# Patient Record
Sex: FEMALE | Race: BLACK OR AFRICAN AMERICAN | NOT HISPANIC OR LATINO | Employment: OTHER | URBAN - METROPOLITAN AREA
[De-identification: names, ages, dates, MRNs, and addresses within clinical notes are randomized per-mention and may not be internally consistent; named-entity substitution may affect disease eponyms.]

---

## 2022-06-03 ENCOUNTER — HOSPITAL ENCOUNTER (EMERGENCY)
Facility: HOSPITAL | Age: 62
Discharge: HOME | End: 2022-06-03
Attending: EMERGENCY MEDICINE
Payer: COMMERCIAL

## 2022-06-03 VITALS
DIASTOLIC BLOOD PRESSURE: 84 MMHG | HEART RATE: 65 BPM | SYSTOLIC BLOOD PRESSURE: 157 MMHG | BODY MASS INDEX: 31.57 KG/M2 | WEIGHT: 189.5 LBS | HEIGHT: 65 IN | RESPIRATION RATE: 17 BRPM | OXYGEN SATURATION: 100 % | TEMPERATURE: 98 F

## 2022-06-03 DIAGNOSIS — R10.32 LEFT LOWER QUADRANT ABDOMINAL PAIN: ICD-10-CM

## 2022-06-03 DIAGNOSIS — K57.92 DIVERTICULITIS: Primary | ICD-10-CM

## 2022-06-03 LAB
ALBUMIN SERPL BCP-MCNC: 3.6 G/DL (ref 3.5–5.2)
ALP SERPL-CCNC: 56 U/L (ref 55–135)
ALT SERPL W/O P-5'-P-CCNC: 24 U/L (ref 10–44)
ANION GAP SERPL CALC-SCNC: 10 MMOL/L (ref 8–16)
AST SERPL-CCNC: 19 U/L (ref 10–40)
BASOPHILS # BLD AUTO: 0.07 K/UL (ref 0–0.2)
BASOPHILS NFR BLD: 0.8 % (ref 0–1.9)
BILIRUB SERPL-MCNC: 0.4 MG/DL (ref 0.1–1)
BILIRUB UR QL STRIP: NEGATIVE
BUN SERPL-MCNC: 11 MG/DL (ref 8–23)
CALCIUM SERPL-MCNC: 9.1 MG/DL (ref 8.7–10.5)
CHLORIDE SERPL-SCNC: 106 MMOL/L (ref 95–110)
CLARITY UR: CLEAR
CO2 SERPL-SCNC: 26 MMOL/L (ref 23–29)
COLOR UR: YELLOW
CREAT SERPL-MCNC: 0.7 MG/DL (ref 0.5–1.4)
DIFFERENTIAL METHOD: ABNORMAL
EOSINOPHIL # BLD AUTO: 0.1 K/UL (ref 0–0.5)
EOSINOPHIL NFR BLD: 1.1 % (ref 0–8)
ERYTHROCYTE [DISTWIDTH] IN BLOOD BY AUTOMATED COUNT: 13.9 % (ref 11.5–14.5)
EST. GFR  (AFRICAN AMERICAN): >60 ML/MIN/1.73 M^2
EST. GFR  (NON AFRICAN AMERICAN): >60 ML/MIN/1.73 M^2
GLUCOSE SERPL-MCNC: 120 MG/DL (ref 70–110)
GLUCOSE UR QL STRIP: NEGATIVE
HCT VFR BLD AUTO: 37.9 % (ref 37–48.5)
HGB BLD-MCNC: 12 G/DL (ref 12–16)
HGB UR QL STRIP: NEGATIVE
IMM GRANULOCYTES # BLD AUTO: 0.03 K/UL (ref 0–0.04)
IMM GRANULOCYTES NFR BLD AUTO: 0.3 % (ref 0–0.5)
KETONES UR QL STRIP: NEGATIVE
LEUKOCYTE ESTERASE UR QL STRIP: NEGATIVE
LYMPHOCYTES # BLD AUTO: 3.1 K/UL (ref 1–4.8)
LYMPHOCYTES NFR BLD: 33.8 % (ref 18–48)
MCH RBC QN AUTO: 27.6 PG (ref 27–31)
MCHC RBC AUTO-ENTMCNC: 31.7 G/DL (ref 32–36)
MCV RBC AUTO: 87 FL (ref 82–98)
MONOCYTES # BLD AUTO: 0.6 K/UL (ref 0.3–1)
MONOCYTES NFR BLD: 6.6 % (ref 4–15)
NEUTROPHILS # BLD AUTO: 5.3 K/UL (ref 1.8–7.7)
NEUTROPHILS NFR BLD: 57.4 % (ref 38–73)
NITRITE UR QL STRIP: NEGATIVE
NRBC BLD-RTO: 0 /100 WBC
PH UR STRIP: 6 [PH] (ref 5–8)
PLATELET # BLD AUTO: 350 K/UL (ref 150–450)
PMV BLD AUTO: 9.4 FL (ref 9.2–12.9)
POTASSIUM SERPL-SCNC: 4.3 MMOL/L (ref 3.5–5.1)
PROT SERPL-MCNC: 7.4 G/DL (ref 6–8.4)
PROT UR QL STRIP: NEGATIVE
RBC # BLD AUTO: 4.34 M/UL (ref 4–5.4)
SODIUM SERPL-SCNC: 142 MMOL/L (ref 136–145)
SP GR UR STRIP: 1.02 (ref 1–1.03)
URN SPEC COLLECT METH UR: NORMAL
UROBILINOGEN UR STRIP-ACNC: NEGATIVE EU/DL
WBC # BLD AUTO: 9.21 K/UL (ref 3.9–12.7)

## 2022-06-03 PROCEDURE — 99284 EMERGENCY DEPT VISIT MOD MDM: CPT | Mod: 25

## 2022-06-03 PROCEDURE — 81003 URINALYSIS AUTO W/O SCOPE: CPT | Performed by: EMERGENCY MEDICINE

## 2022-06-03 PROCEDURE — 85025 COMPLETE CBC W/AUTO DIFF WBC: CPT | Performed by: EMERGENCY MEDICINE

## 2022-06-03 PROCEDURE — 80053 COMPREHEN METABOLIC PANEL: CPT | Performed by: EMERGENCY MEDICINE

## 2022-06-03 RX ORDER — HYDROCHLOROTHIAZIDE 25 MG/1
25 TABLET ORAL DAILY
COMMUNITY
Start: 2022-02-08

## 2022-06-03 RX ORDER — METRONIDAZOLE 500 MG/1
500 TABLET ORAL EVERY 12 HOURS
Qty: 14 TABLET | Refills: 0 | Status: SHIPPED | OUTPATIENT
Start: 2022-06-03 | End: 2022-06-10

## 2022-06-03 RX ORDER — CIPROFLOXACIN 500 MG/1
500 TABLET ORAL 2 TIMES DAILY
Qty: 14 TABLET | Refills: 0 | Status: SHIPPED | OUTPATIENT
Start: 2022-06-03 | End: 2022-06-10

## 2022-06-03 RX ORDER — BRIMONIDINE TARTRATE, TIMOLOL MALEATE 2; 5 MG/ML; MG/ML
1 SOLUTION/ DROPS OPHTHALMIC 2 TIMES DAILY
COMMUNITY
Start: 2022-05-20

## 2022-06-03 RX ORDER — DIFLUPREDNATE OPHTHALMIC 0.5 MG/ML
1 EMULSION OPHTHALMIC 4 TIMES DAILY
COMMUNITY
Start: 2022-04-29

## 2022-06-03 RX ORDER — LATANOPROST 50 UG/ML
1 SOLUTION/ DROPS OPHTHALMIC NIGHTLY
COMMUNITY
Start: 2022-05-17

## 2022-06-03 RX ORDER — BENAZEPRIL HYDROCHLORIDE 20 MG/1
20 TABLET ORAL DAILY
COMMUNITY
Start: 2022-02-08

## 2022-06-03 RX ORDER — LEVOTHYROXINE SODIUM 75 UG/1
75 TABLET ORAL DAILY
COMMUNITY
Start: 2022-02-08

## 2022-06-03 RX ORDER — METFORMIN HYDROCHLORIDE 500 MG/1
500 TABLET ORAL 2 TIMES DAILY
COMMUNITY
Start: 2022-02-08

## 2022-06-03 RX ORDER — BROMFENAC SODIUM 0.81 MG/ML
1 SOLUTION/ DROPS OPHTHALMIC DAILY
COMMUNITY
Start: 2022-06-02

## 2022-06-03 NOTE — ED PROVIDER NOTES
SCRIBE #1 NOTE: I, Celina Ortega, am scribing for, and in the presence of, Emmanuel San MD. I have scribed the entire note.      History      Chief Complaint   Patient presents with    Abdominal Pain     Pt states LLQ abd pain x 3 days denies N/V/D. States she has a hx of diverticulitis       Review of patient's allergies indicates:  No Known Allergies     HPI   HPI    6/3/2022, 8:19 AM   History obtained from the patient      History of Present Illness: Marlyn Gary is a 61 y.o. female patient with a PMHx of diverticulitis who presents to the Emergency Department for LLQ abdominal pain which onset gradually 3 days PTA. The pt reports that she has a history of diverticulitis, and that her sxs are similar to when she has had diverticulitis in the past. Symptoms are constant and moderate in severity. No mitigating or exacerbating factors reported. No associated sxs reported. Patient denies any N/V/D, dysuria, fever, chills, SOB, CP, weakness, and all other sxs at this time. No prior Tx reported. No further complaints or concerns at this time.         Arrival mode: Personal vehicle     PCP: RAY Rick       Past Medical History:  No past medical history on file.    Past Surgical History:  No past surgical history on file.      Family History:  No family history on file.    Social History:  Social History     Tobacco Use    Smoking status: Not on file    Smokeless tobacco: Not on file   Substance and Sexual Activity    Alcohol use: Not on file    Drug use: Not on file    Sexual activity: Not on file       ROS   Review of Systems   Constitutional: Negative for chills and fever.   HENT: Negative for sore throat.    Respiratory: Negative for shortness of breath.    Cardiovascular: Negative for chest pain.   Gastrointestinal: Positive for abdominal pain (LLQ). Negative for diarrhea, nausea and vomiting.   Genitourinary: Negative for dysuria.   Musculoskeletal: Negative for back pain.   Skin:  "Negative for rash.   Neurological: Negative for weakness.   Hematological: Does not bruise/bleed easily.   All other systems reviewed and are negative.    Physical Exam      Initial Vitals [06/03/22 0812]   BP Pulse Resp Temp SpO2   (!) 152/85 72 17 98.2 °F (36.8 °C) 99 %      MAP       --          Physical Exam  Nursing Notes and Vital Signs Reviewed.  Constitutional: Patient is in no acute distress. Well-developed and well-nourished.  Head: Atraumatic. Normocephalic.  Eyes: PERRL. EOM intact. Conjunctivae are not pale. No scleral icterus.  ENT: Mucous membranes are moist. Oropharynx is clear and symmetric.    Neck: Supple. Full ROM. No lymphadenopathy.  Cardiovascular: Regular rate. Regular rhythm. No murmurs, rubs, or gallops. Distal pulses are 2+ and symmetric.  Pulmonary/Chest: No respiratory distress. Clear to auscultation bilaterally. No wheezing or rales.  Abdominal: Soft and non-distended.  There is mild LLQ tenderness to palpation.  No rebound, guarding, or rigidity.   Musculoskeletal: Moves all extremities. No obvious deformities. No edema.  Skin: Warm and dry.  Neurological:  Alert, awake, and appropriate.  Normal speech.  No acute focal neurological deficits are appreciated.  Psychiatric: Normal affect. Good eye contact. Appropriate in content.    ED Course    Procedures  ED Vital Signs:  Vitals:    06/03/22 0812   BP: (!) 152/85   Pulse: 72   Resp: 17   Temp: 98.2 °F (36.8 °C)   TempSrc: Oral   SpO2: 99%   Weight: 86 kg (189 lb 7.8 oz)   Height: 5' 4.5" (1.638 m)       Abnormal Lab Results:  Labs Reviewed   CBC W/ AUTO DIFFERENTIAL - Abnormal; Notable for the following components:       Result Value    MCHC 31.7 (*)     All other components within normal limits   COMPREHENSIVE METABOLIC PANEL - Abnormal; Notable for the following components:    Glucose 120 (*)     All other components within normal limits   URINALYSIS, REFLEX TO URINE CULTURE    Narrative:     Specimen Source->Urine        All Lab " Results:  Results for orders placed or performed during the hospital encounter of 06/03/22   CBC Auto Differential   Result Value Ref Range    WBC 9.21 3.90 - 12.70 K/uL    RBC 4.34 4.00 - 5.40 M/uL    Hemoglobin 12.0 12.0 - 16.0 g/dL    Hematocrit 37.9 37.0 - 48.5 %    MCV 87 82 - 98 fL    MCH 27.6 27.0 - 31.0 pg    MCHC 31.7 (L) 32.0 - 36.0 g/dL    RDW 13.9 11.5 - 14.5 %    Platelets 350 150 - 450 K/uL    MPV 9.4 9.2 - 12.9 fL    Immature Granulocytes 0.3 0.0 - 0.5 %    Gran # (ANC) 5.3 1.8 - 7.7 K/uL    Immature Grans (Abs) 0.03 0.00 - 0.04 K/uL    Lymph # 3.1 1.0 - 4.8 K/uL    Mono # 0.6 0.3 - 1.0 K/uL    Eos # 0.1 0.0 - 0.5 K/uL    Baso # 0.07 0.00 - 0.20 K/uL    nRBC 0 0 /100 WBC    Gran % 57.4 38.0 - 73.0 %    Lymph % 33.8 18.0 - 48.0 %    Mono % 6.6 4.0 - 15.0 %    Eosinophil % 1.1 0.0 - 8.0 %    Basophil % 0.8 0.0 - 1.9 %    Differential Method Automated    Comprehensive Metabolic Panel   Result Value Ref Range    Sodium 142 136 - 145 mmol/L    Potassium 4.3 3.5 - 5.1 mmol/L    Chloride 106 95 - 110 mmol/L    CO2 26 23 - 29 mmol/L    Glucose 120 (H) 70 - 110 mg/dL    BUN 11 8 - 23 mg/dL    Creatinine 0.7 0.5 - 1.4 mg/dL    Calcium 9.1 8.7 - 10.5 mg/dL    Total Protein 7.4 6.0 - 8.4 g/dL    Albumin 3.6 3.5 - 5.2 g/dL    Total Bilirubin 0.4 0.1 - 1.0 mg/dL    Alkaline Phosphatase 56 55 - 135 U/L    AST 19 10 - 40 U/L    ALT 24 10 - 44 U/L    Anion Gap 10 8 - 16 mmol/L    eGFR if African American >60 >60 mL/min/1.73 m^2    eGFR if non African American >60 >60 mL/min/1.73 m^2   Urinalysis, Reflex to Urine Culture Urine, Clean Catch    Specimen: Urine   Result Value Ref Range    Specimen UA Urine, Clean Catch     Color, UA Yellow Yellow, Straw, Maci    Appearance, UA Clear Clear    pH, UA 6.0 5.0 - 8.0    Specific Gravity, UA 1.025 1.005 - 1.030    Protein, UA Negative Negative    Glucose, UA Negative Negative    Ketones, UA Negative Negative    Bilirubin (UA) Negative Negative    Occult Blood UA Negative  Negative    Nitrite, UA Negative Negative    Urobilinogen, UA Negative <2.0 EU/dL    Leukocytes, UA Negative Negative         Imaging Results:  Imaging Results          CT Renal Stone Study ABD Pelvis WO (Final result)  Result time 06/03/22 08:56:23    Final result by Nara Crowder MD (Timothy) (06/03/22 08:56:23)                 Impression:      Findings compatible with diverticulitis.  No evidence of perforation or abscess.  No evidence of obstructive uropathy.    All CT scans at this facility use dose modulation, iterative reconstructions, and/or weight base dosing when appropriate to reduce radiation dose to as low as reasonably achievable      Electronically signed by: Nara Crowder MD  Date:    06/03/2022  Time:    08:56             Narrative:    EXAMINATION:  CT RENAL STONE STUDY ABD PELVIS WO    CLINICAL HISTORY:  Flank pain, kidney stone suspected;    COMPARISON:  None    FINDINGS:  Lung bases are clear    2.4 cm left lobe liver cyst.  No suspicious masses.  The pancreas appears normal.  Spleen is unremarkable.    Prior cholecystectomy.  No bile duct dilatation.    The kidneys are normal.    The aorta and inferior vena cava are unremarkable.    No evidence of bowel obstruction.  Normal appendix.  Inflammatory changes are seen involving this sigmoid colon compatible with diverticulitis.  No evidence of perforation or abscess.    Bladder is normal. No abnormal masses or fluid collections in the pelvis.    Skeletal structures are intact.  No acute skeletal findings.                                        The Emergency Provider reviewed the vital signs and test results, which are outlined above.    ED Discussion     9:13 AM: Reassessed pt at this time. Discussed with pt all pertinent ED information and results. Discussed pt dx and plan of tx. Gave pt all f/u and return to the ED instructions. All questions and concerns were addressed at this time. Pt expresses understanding of information and instructions,  and is comfortable with plan to discharge. Pt is stable for discharge.    I discussed with patient and/or family/caretaker that evaluation in the ED does not suggest any emergent or life threatening medical conditions requiring immediate intervention beyond what was provided in the ED, and I believe patient is safe for discharge.  Regardless, an unremarkable evaluation in the ED does not preclude the development or presence of a serious of life threatening condition. As such, patient was instructed to return immediately for any worsening or change in current symptoms.           ED Medication(s):  Medications - No data to display     Follow-up Information     RAY Rick.    Specialty: General Practice  Contact information:  [address]  [address]  [facility]  [place] [id number] [phone]                         New Prescriptions    CIPROFLOXACIN HCL (CIPRO) 500 MG TABLET    Take 1 tablet (500 mg total) by mouth 2 (two) times daily. for 7 days    METRONIDAZOLE (FLAGYL) 500 MG TABLET    Take 1 tablet (500 mg total) by mouth every 12 (twelve) hours. for 7 days        Medication List      START taking these medications    ciprofloxacin HCl 500 MG tablet  Commonly known as: CIPRO  Take 1 tablet (500 mg total) by mouth 2 (two) times daily. for 7 days     metroNIDAZOLE 500 MG tablet  Commonly known as: FLAGYL  Take 1 tablet (500 mg total) by mouth every 12 (twelve) hours. for 7 days        ASK your doctor about these medications    benazepriL 20 MG tablet  Commonly known as: LOTENSIN     bromfenac 0.07 % Drop  Commonly known as: PROLENSA     COMBIGAN 0.2-0.5 % Drop  Generic drug: brimonidine-timoloL     difluprednate 0.05 % Drop ophthalmic solution  Commonly known as: DUREZOL     hydroCHLOROthiazide 25 MG tablet  Commonly known as: HYDRODIURIL     latanoprost 0.005 % ophthalmic solution     metFORMIN 500 MG tablet  Commonly known as: GLUCOPHAGE     SYNTHROID 75 MCG tablet  Generic drug:  levothyroxine           Where to Get Your Medications      You can get these medications from any pharmacy    Bring a paper prescription for each of these medications  · ciprofloxacin HCl 500 MG tablet  · metroNIDAZOLE 500 MG tablet           Medical Decision Making    Medical Decision Making:   Clinical Tests:   Lab Tests: Ordered and Reviewed  Radiological Study: Ordered and Reviewed           Scribe Attestation:   Scribe #1: I performed the above scribed service and the documentation accurately describes the services I performed. I attest to the accuracy of the note.    Attending:   Physician Attestation Statement for Scribe #1: I, Emmanuel San MD, personally performed the services described in this documentation, as scribed by Celina Ortega, in my presence, and it is both accurate and complete.          Clinical Impression       ICD-10-CM ICD-9-CM   1. Diverticulitis  K57.92 562.11   2. Left lower quadrant abdominal pain  R10.32 789.04       Disposition:   Disposition: Discharged  Condition: Stable         Emmanuel San MD  06/03/22 0945

## 2022-06-03 NOTE — PHARMACY MED REC
"Admission Medication History     The home medication history was taken by Juan Moya.    You may go to "Admission" then "Reconcile Home Medications" tabs to review and/or act upon these items.      The home medication list has been updated by the Pharmacy department.    Please read ALL comments highlighted in yellow.    Please address this information as you see fit.     Feel free to contact us if you have any questions or require assistance.      Medications listed below were obtained from: Patient/family, [employer] and Patient's pharmacy  (Not in a hospital admission)      Potential issues to be addressed PRIOR TO DISCHARGE: NONE      Juan Moya, [facility]  [employer] [phone]  Feel free to secure chat me.      Current Outpatient Medications on File Prior to Encounter   Medication Sig Dispense Refill Last Dose    benazepriL (LOTENSIN) 20 MG tablet Take 20 mg by mouth once daily.   Past Week at 06/01/2022    COMBIGAN 0.2-0.5 % Drop Place 1 drop into both eyes 2 (two) times daily.   6/3/2022 at Unknown time    difluprednate (DUREZOL) 0.05 % Drop ophthalmic solution Place 1 drop into both eyes 4 (four) times daily.   6/3/2022 at Unknown time    hydroCHLOROthiazide (HYDRODIURIL) 25 MG tablet Take 25 mg by mouth once daily.   Past Week at 06/01/2022    latanoprost 0.005 % ophthalmic solution Place 1 drop into both eyes nightly.   6/2/2022 at Unknown time    metFORMIN (GLUCOPHAGE) 500 MG tablet Take 500 mg by mouth 2 (two) times daily.   Past Week at 06/01/2022    SYNTHROID 75 mcg tablet Take 75 mcg by mouth once daily.   Past Week at 06/01/2022    bromfenac (PROLENSA) 0.07 % Drop Apply 1 drop to eye once daily. (both eyes)                              .          "

## 2022-07-02 ENCOUNTER — HOSPITAL ENCOUNTER (EMERGENCY)
Facility: HOSPITAL | Age: 62
Discharge: HOME | End: 2022-07-02
Attending: EMERGENCY MEDICINE
Payer: COMMERCIAL

## 2022-07-02 VITALS
BODY MASS INDEX: 31.22 KG/M2 | TEMPERATURE: 99 F | DIASTOLIC BLOOD PRESSURE: 74 MMHG | RESPIRATION RATE: 17 BRPM | OXYGEN SATURATION: 96 % | SYSTOLIC BLOOD PRESSURE: 152 MMHG | WEIGHT: 184.75 LBS | HEART RATE: 71 BPM

## 2022-07-02 DIAGNOSIS — K57.32 DIVERTICULITIS LARGE INTESTINE W/O PERFORATION OR ABSCESS W/O BLEEDING: Primary | ICD-10-CM

## 2022-07-02 LAB
ALBUMIN SERPL BCP-MCNC: 4 G/DL (ref 3.5–5.2)
ALP SERPL-CCNC: 70 U/L (ref 55–135)
ALT SERPL W/O P-5'-P-CCNC: 29 U/L (ref 10–44)
ANION GAP SERPL CALC-SCNC: 11 MMOL/L (ref 8–16)
AST SERPL-CCNC: 30 U/L (ref 10–40)
BASOPHILS # BLD AUTO: 0.09 K/UL (ref 0–0.2)
BASOPHILS NFR BLD: 0.8 % (ref 0–1.9)
BILIRUB SERPL-MCNC: 0.3 MG/DL (ref 0.1–1)
BILIRUB UR QL STRIP: NEGATIVE
BUN SERPL-MCNC: 10 MG/DL (ref 8–23)
CALCIUM SERPL-MCNC: 9.8 MG/DL (ref 8.7–10.5)
CHLORIDE SERPL-SCNC: 101 MMOL/L (ref 95–110)
CLARITY UR: CLEAR
CO2 SERPL-SCNC: 27 MMOL/L (ref 23–29)
COLOR UR: COLORLESS
CREAT SERPL-MCNC: 0.7 MG/DL (ref 0.5–1.4)
DIFFERENTIAL METHOD: ABNORMAL
EOSINOPHIL # BLD AUTO: 0.2 K/UL (ref 0–0.5)
EOSINOPHIL NFR BLD: 2.1 % (ref 0–8)
ERYTHROCYTE [DISTWIDTH] IN BLOOD BY AUTOMATED COUNT: 13.7 % (ref 11.5–14.5)
EST. GFR  (AFRICAN AMERICAN): >60 ML/MIN/1.73 M^2
EST. GFR  (NON AFRICAN AMERICAN): >60 ML/MIN/1.73 M^2
GLUCOSE SERPL-MCNC: 84 MG/DL (ref 70–110)
GLUCOSE UR QL STRIP: NEGATIVE
HCT VFR BLD AUTO: 44.6 % (ref 37–48.5)
HGB BLD-MCNC: 14.3 G/DL (ref 12–16)
HGB UR QL STRIP: NEGATIVE
IMM GRANULOCYTES # BLD AUTO: 0.03 K/UL (ref 0–0.04)
IMM GRANULOCYTES NFR BLD AUTO: 0.3 % (ref 0–0.5)
KETONES UR QL STRIP: NEGATIVE
LEUKOCYTE ESTERASE UR QL STRIP: NEGATIVE
LYMPHOCYTES # BLD AUTO: 4.2 K/UL (ref 1–4.8)
LYMPHOCYTES NFR BLD: 37 % (ref 18–48)
MCH RBC QN AUTO: 27.6 PG (ref 27–31)
MCHC RBC AUTO-ENTMCNC: 32.1 G/DL (ref 32–36)
MCV RBC AUTO: 86 FL (ref 82–98)
MONOCYTES # BLD AUTO: 0.9 K/UL (ref 0.3–1)
MONOCYTES NFR BLD: 7.7 % (ref 4–15)
NEUTROPHILS # BLD AUTO: 5.9 K/UL (ref 1.8–7.7)
NEUTROPHILS NFR BLD: 52.1 % (ref 38–73)
NITRITE UR QL STRIP: NEGATIVE
NRBC BLD-RTO: 0 /100 WBC
PH UR STRIP: 5 [PH] (ref 5–8)
PLATELET # BLD AUTO: 459 K/UL (ref 150–450)
PMV BLD AUTO: 8.9 FL (ref 9.2–12.9)
POTASSIUM SERPL-SCNC: 4.8 MMOL/L (ref 3.5–5.1)
PROT SERPL-MCNC: 8.9 G/DL (ref 6–8.4)
PROT UR QL STRIP: NEGATIVE
RBC # BLD AUTO: 5.19 M/UL (ref 4–5.4)
SODIUM SERPL-SCNC: 139 MMOL/L (ref 136–145)
SP GR UR STRIP: 1 (ref 1–1.03)
URN SPEC COLLECT METH UR: ABNORMAL
UROBILINOGEN UR STRIP-ACNC: NEGATIVE EU/DL
WBC # BLD AUTO: 11.33 K/UL (ref 3.9–12.7)

## 2022-07-02 PROCEDURE — 25000003 PHARM REV CODE 250: Performed by: EMERGENCY MEDICINE

## 2022-07-02 PROCEDURE — 99285 EMERGENCY DEPT VISIT HI MDM: CPT | Mod: 25

## 2022-07-02 PROCEDURE — 96360 HYDRATION IV INFUSION INIT: CPT

## 2022-07-02 PROCEDURE — 81003 URINALYSIS AUTO W/O SCOPE: CPT | Performed by: EMERGENCY MEDICINE

## 2022-07-02 PROCEDURE — 96361 HYDRATE IV INFUSION ADD-ON: CPT

## 2022-07-02 PROCEDURE — 80053 COMPREHEN METABOLIC PANEL: CPT | Performed by: EMERGENCY MEDICINE

## 2022-07-02 PROCEDURE — 85025 COMPLETE CBC W/AUTO DIFF WBC: CPT | Performed by: EMERGENCY MEDICINE

## 2022-07-02 RX ORDER — AMOXICILLIN AND CLAVULANATE POTASSIUM 875; 125 MG/1; MG/1
1 TABLET, FILM COATED ORAL 2 TIMES DAILY
Qty: 14 TABLET | Refills: 0 | Status: ON HOLD | OUTPATIENT
Start: 2022-07-02 | End: 2022-07-13 | Stop reason: SDUPTHER

## 2022-07-02 RX ORDER — HYDROCODONE BITARTRATE AND ACETAMINOPHEN 7.5; 325 MG/1; MG/1
1 TABLET ORAL EVERY 6 HOURS PRN
Qty: 18 TABLET | Refills: 0 | Status: ON HOLD | OUTPATIENT
Start: 2022-07-02 | End: 2022-10-08 | Stop reason: SDUPTHER

## 2022-07-02 RX ADMIN — SODIUM CHLORIDE 1000 ML: 0.9 INJECTION, SOLUTION INTRAVENOUS at 03:07

## 2022-07-02 NOTE — ED PROVIDER NOTES
SCRIBE #1 NOTE: I, Noe Moon, am scribing for, and in the presence of, Beatrice Galeas MD. I have scribed the entire note.       History     Chief Complaint   Patient presents with    Abdominal Pain     LLQ abdominal pain, bloating, seen last month, hx diverticulitis, states abx have not helped      Review of patient's allergies indicates:  No Known Allergies      History of Present Illness     HPI    7/2/2022, 3:25 PM  History obtained from the patient      History of Present Illness: Marlyn Gary is a 61 y.o. female patient who presents to the Emergency Department for evaluation of abdominal pain which onset a couple of weeks ago. Symptoms are constant and moderate in severity. No mitigating or exacerbating factors reported. Associated sxs include diarrhea. Patient denies any fever, SOB, CP, back pain, sore throat, chills, N/V, weakness, dysuria, and all other sxs at this time. Prior Tx includes antibiotics, which the patient has taken all of without improvement of sxs. No further complaints or concerns at this time.       Arrival mode: Personal vehicle    PCP: RAY Rick        Past Medical History:  No past medical history on file.    Past Surgical History:  No past surgical history on file.      Family History:  No family history on file.    Social History:  Social History     Tobacco Use    Smoking status: Not on file    Smokeless tobacco: Not on file   Substance and Sexual Activity    Alcohol use: Not on file    Drug use: Not on file    Sexual activity: Not on file        Review of Systems     Review of Systems   Constitutional: Negative for chills and fever.   HENT: Negative for sore throat.    Respiratory: Negative for shortness of breath.    Cardiovascular: Negative for chest pain.   Gastrointestinal: Positive for abdominal pain and diarrhea. Negative for nausea and vomiting.   Genitourinary: Negative for dysuria.   Musculoskeletal: Negative for back pain.   Skin: Negative  for rash.   Neurological: Negative for weakness.   Hematological: Does not bruise/bleed easily.   All other systems reviewed and are negative.     Physical Exam     Initial Vitals [07/02/22 1412]   BP Pulse Resp Temp SpO2   119/69 89 20 98.6 °F (37 °C) 98 %      MAP       --          Physical Exam  Nursing Notes and Vital Signs Reviewed.  Constitutional: Patient is in no apparent distress. Well-developed and well-nourished.  Head: Atraumatic. Normocephalic.  Eyes: PERRL. EOM intact. Conjunctivae are not pale. No scleral icterus.  ENT: Mucous membranes are moist. Oropharynx is clear and symmetric.    Neck: Supple. Full ROM. No lymphadenopathy.  Cardiovascular: Regular rate. Regular rhythm. No murmurs, rubs, or gallops. Distal pulses are 2+ and symmetric.  Pulmonary/Chest: No respiratory distress. Clear to auscultation bilaterally. No wheezing or rales.  Abdominal: Soft and non-distended.  There is no tenderness.  No rebound, guarding, or rigidity. Good bowel sounds.  Musculoskeletal: Moves all extremities. No obvious deformities. No edema. No calf tenderness.  Skin: Warm and dry.  Neurological:  Alert, awake, and appropriate.  Normal speech.  No acute focal neurological deficits are appreciated.  Psychiatric: Normal affect. Good eye contact. Appropriate in content.     ED Course   Procedures  ED Vital Signs:  Vitals:    07/02/22 1412 07/02/22 1707   BP: 119/69 (!) 152/74   Pulse: 89 71   Resp: 20 17   Temp: 98.6 °F (37 °C)    TempSrc: Oral    SpO2: 98% 96%   Weight: 83.8 kg (184 lb 11.9 oz)        Abnormal Lab Results:  Labs Reviewed   CBC W/ AUTO DIFFERENTIAL - Abnormal; Notable for the following components:       Result Value    Platelets 459 (*)     MPV 8.9 (*)     All other components within normal limits   COMPREHENSIVE METABOLIC PANEL - Abnormal; Notable for the following components:    Total Protein 8.9 (*)     All other components within normal limits   URINALYSIS, REFLEX TO URINE CULTURE - Abnormal; Notable  for the following components:    Color, UA Colorless (*)     All other components within normal limits    Narrative:     Specimen Source->Urine        All Lab Results:  Results for orders placed or performed during the hospital encounter of 07/02/22   CBC auto differential   Result Value Ref Range    WBC 11.33 3.90 - 12.70 K/uL    RBC 5.19 4.00 - 5.40 M/uL    Hemoglobin 14.3 12.0 - 16.0 g/dL    Hematocrit 44.6 37.0 - 48.5 %    MCV 86 82 - 98 fL    MCH 27.6 27.0 - 31.0 pg    MCHC 32.1 32.0 - 36.0 g/dL    RDW 13.7 11.5 - 14.5 %    Platelets 459 (H) 150 - 450 K/uL    MPV 8.9 (L) 9.2 - 12.9 fL    Immature Granulocytes 0.3 0.0 - 0.5 %    Gran # (ANC) 5.9 1.8 - 7.7 K/uL    Immature Grans (Abs) 0.03 0.00 - 0.04 K/uL    Lymph # 4.2 1.0 - 4.8 K/uL    Mono # 0.9 0.3 - 1.0 K/uL    Eos # 0.2 0.0 - 0.5 K/uL    Baso # 0.09 0.00 - 0.20 K/uL    nRBC 0 0 /100 WBC    Gran % 52.1 38.0 - 73.0 %    Lymph % 37.0 18.0 - 48.0 %    Mono % 7.7 4.0 - 15.0 %    Eosinophil % 2.1 0.0 - 8.0 %    Basophil % 0.8 0.0 - 1.9 %    Differential Method Automated    Comprehensive metabolic panel   Result Value Ref Range    Sodium 139 136 - 145 mmol/L    Potassium 4.8 3.5 - 5.1 mmol/L    Chloride 101 95 - 110 mmol/L    CO2 27 23 - 29 mmol/L    Glucose 84 70 - 110 mg/dL    BUN 10 8 - 23 mg/dL    Creatinine 0.7 0.5 - 1.4 mg/dL    Calcium 9.8 8.7 - 10.5 mg/dL    Total Protein 8.9 (H) 6.0 - 8.4 g/dL    Albumin 4.0 3.5 - 5.2 g/dL    Total Bilirubin 0.3 0.1 - 1.0 mg/dL    Alkaline Phosphatase 70 55 - 135 U/L    AST 30 10 - 40 U/L    ALT 29 10 - 44 U/L    Anion Gap 11 8 - 16 mmol/L    eGFR if African American >60 >60 mL/min/1.73 m^2    eGFR if non African American >60 >60 mL/min/1.73 m^2   Urinalysis, Reflex to Urine Culture Urine, Clean Catch    Specimen: Urine   Result Value Ref Range    Specimen UA Urine, Clean Catch     Color, UA Colorless (A) Yellow, Straw, Maci    Appearance, UA Clear Clear    pH, UA 5.0 5.0 - 8.0    Specific Gravity, UA 1.005 1.005 -  1.030    Protein, UA Negative Negative    Glucose, UA Negative Negative    Ketones, UA Negative Negative    Bilirubin (UA) Negative Negative    Occult Blood UA Negative Negative    Nitrite, UA Negative Negative    Urobilinogen, UA Negative <2.0 EU/dL    Leukocytes, UA Negative Negative       Imaging Results:  Imaging Results           CT Abdomen Pelvis  Without Contrast (Final result)  Result time 07/02/22 17:09:45    Final result by Cedrick Jennings MD (07/02/22 17:09:45)                 Impression:      Along the sigmoid colon there is local inflammatory change which tracks towards the vagina.  It is unclear if this represents fistulization. Residual or recurrent diverticulitis could appear similarly. Correlation to symptoms is advised.    Mild colonic wall thickening over the sigmoid colon and to a lesser extent the rectum possibly related to nondistention or infectious or inflammatory colitis.    This report was flagged in Epic as abnormal.    All CT scans at this facility are performed  using dose modulation techniques as appropriate to performed exam including the following:  automated exposure control; adjustment of mA and/or kV according to the patients size (this includes techniques or standardized protocols for targeted exams where dose is matched to indication/reason for exam: i.e. extremities or head);  iterative reconstruction technique.      Electronically signed by: Cedrick Jennings  Date:    07/02/2022  Time:    17:09             Narrative:    EXAMINATION:  CT ABDOMEN PELVIS WITHOUT CONTRAST    CLINICAL HISTORY:  LLQ abdominal pain;    TECHNIQUE:  Low dose axial images, sagittal and coronal reformations were obtained from the lung bases to the pubic symphysis.  Contrast was not administered.    COMPARISON:  06/03/2022    FINDINGS:  Heart: Normal in size. No pericardial effusion.    Lung Bases: Well aerated, without consolidation or pleural fluid.    Liver: Liver cysts stable from the  prior.    Gallbladder: Surgically absent.    Bile Ducts: No evidence of dilated ducts.    Pancreas: No mass or peripancreatic fat stranding.    Spleen: Unremarkable.    Adrenals: Unremarkable.    Kidneys/ Ureters: Unremarkable.    Bladder: No evidence of wall thickening.    Reproductive organs: Status post hysterectomy.    GI Tract/Mesentery: Along the sigmoid colon there is local inflammatory change which tracks towards the vagina.  It is unclear if this represents fistulization.  Residual or recurrent diverticulitis could appear similarly.  Correlation to symptoms is advised.    Mild colonic wall thickening over the sigmoid colon and to a lesser extent the rectum possibly related to nondistention or infectious or inflammatory colitis.    Peritoneal Space: No ascites. No free air.    Retroperitoneum: No significant adenopathy.    Abdominal wall: Unremarkable.    Vasculature: Minimal aortoiliac atherosclerosis.  No aneurysm.    Bones: No acute fracture.                                        The Emergency Provider reviewed the vital signs and test results, which are outlined above.     ED Discussion     5:13 PM: Reassessed pt at this time, will tx for diverticulitis, as discussed needs to follow up closely with her GI doctor as discussed, needs colonoscopy considering diverticulitis has not resolved. Discussed with pt all pertinent ED information and results. Discussed pt dx and plan of tx. Gave pt all f/u and return to the ED instructions. All questions and concerns were addressed at this time. Pt expresses understanding of information and instructions, and is comfortable with plan to discharge. Pt is stable for discharge.    I discussed with patient and/or family/caretaker that evaluation in the ED does not suggest any emergent or life threatening medical conditions requiring immediate intervention beyond what was provided in the ED, and I believe patient is safe for discharge.  Regardless, an unremarkable  evaluation in the ED does not preclude the development or presence of a serious of life threatening condition. As such, patient was instructed to return immediately for any worsening or change in current symptoms.       Medical Decision Making:   Clinical Tests:   Lab Tests: Ordered and Reviewed  Radiological Study: Ordered and Reviewed           ED Medication(s):  Medications   iohexoL (OMNIPAQUE 9) oral solution 1,000 mL (has no administration in time range)   sodium chloride 0.9% bolus 1,000 mL (0 mLs Intravenous Stopped 7/2/22 1736)       Discharge Medication List as of 7/2/2022  5:14 PM      START taking these medications    Details   amoxicillin-clavulanate 875-125mg (AUGMENTIN) 875-125 mg per tablet Take 1 tablet by mouth 2 (two) times daily., Starting Sat 7/2/2022, Normal      HYDROcodone-acetaminophen (NORCO) 7.5-325 mg per tablet Take 1 tablet by mouth every 6 (six) hours as needed for Pain., Starting Sat 7/2/2022, Normal              Follow-up Information     [facility]. Schedule an appointment as soon as possible for a visit in 2 days.    Specialty: Gastroenterology  Why: Return to the Emergency Room, If symptoms worsen  Contact information:  [id number] [place] [id number] [phone]                           Scribe Attestation:   Scribe #1: I performed the above scribed service and the documentation accurately describes the services I performed. I attest to the accuracy of the note.     Attending:   Physician Attestation Statement for Scribe #1: I, Beatrice Galeas MD, personally performed the services described in this documentation, as scribed by Noe Moon, in my presence, and it is both accurate and complete.           Clinical Impression       ICD-10-CM ICD-9-CM   1. Diverticulitis large intestine w/o perforation or abscess w/o bleeding  K57.32 562.11       Disposition:   Disposition: Discharged  Condition: Stable       Beatrice Galeas MD  07/02/22  1903

## 2022-07-02 NOTE — ED NOTES
Pt is a/o x 4. C/O LLQ pain for a few days with watery stool. She reports frequent diverticulitis episodes. Pt denies chest pain, sob, dizziness, fever, n/v. IV has been established, blood sent to lab, NS infusing.

## 2022-07-05 ENCOUNTER — TELEPHONE (OUTPATIENT)
Dept: SURGERY | Facility: CLINIC | Age: 62
End: 2022-07-05
Payer: COMMERCIAL

## 2022-07-05 NOTE — TELEPHONE ENCOUNTER
Spoke to and scheduled patient's ED Follow Up appointment with Dr Cole for Wednesday 7/13 [facility] @ 1420 - Patient correctly repeated back all appointment information      ----- Message from Simba Lane sent at 7/5/2022  2:57 PM CDT -----  Contact: marlyn  .Type:  Sooner Apoointment Request    Caller is requesting a sooner appointment.  Caller declined first available appointment listed below.  Caller will not accept being placed on the waitlist and is requesting a message be sent to doctor.  Name of Caller:Marlyn  When is the first available appointment?08/29  Symptoms:colon pain/pt was seen in the hospital over the weekend  Would the patient rather a call back or a response via [facility]? Call back  Best Call Back Number:[phone]  Additional Information: n/a          Thanks  DD

## 2022-07-06 ENCOUNTER — HOSPITAL ENCOUNTER (EMERGENCY)
Facility: HOSPITAL | Age: 62
Discharge: ANOTHER HOSPITAL | End: 2022-07-06
Attending: EMERGENCY MEDICINE
Payer: COMMERCIAL

## 2022-07-06 ENCOUNTER — HOSPITAL ENCOUNTER (INPATIENT)
Facility: HOSPITAL | Age: 62
LOS: 7 days | Discharge: HOME | DRG: 392 | End: 2022-07-13
Attending: COLON & RECTAL SURGERY | Admitting: COLON & RECTAL SURGERY
Payer: COMMERCIAL

## 2022-07-06 VITALS
TEMPERATURE: 98 F | OXYGEN SATURATION: 98 % | SYSTOLIC BLOOD PRESSURE: 180 MMHG | RESPIRATION RATE: 16 BRPM | BODY MASS INDEX: 30.93 KG/M2 | HEART RATE: 74 BPM | HEIGHT: 65 IN | WEIGHT: 185.63 LBS | DIASTOLIC BLOOD PRESSURE: 84 MMHG

## 2022-07-06 DIAGNOSIS — K57.92 DIVERTICULITIS: Primary | ICD-10-CM

## 2022-07-06 DIAGNOSIS — K57.92 DIVERTICULITIS: ICD-10-CM

## 2022-07-06 DIAGNOSIS — R10.9 INTRACTABLE ABDOMINAL PAIN: ICD-10-CM

## 2022-07-06 DIAGNOSIS — K57.32 DIVERTICULITIS LARGE INTESTINE W/O PERFORATION OR ABSCESS W/O BLEEDING: ICD-10-CM

## 2022-07-06 PROBLEM — H40.9 GLAUCOMA: Status: ACTIVE | Noted: 2022-07-06

## 2022-07-06 PROBLEM — E11.9 DIABETES MELLITUS, TYPE 2: Status: ACTIVE | Noted: 2022-07-06

## 2022-07-06 PROBLEM — I10 ESSENTIAL HYPERTENSION: Status: ACTIVE | Noted: 2022-07-06

## 2022-07-06 PROBLEM — E03.9 HYPOTHYROIDISM: Status: ACTIVE | Noted: 2022-07-06

## 2022-07-06 LAB
ALBUMIN SERPL BCP-MCNC: 3.7 G/DL (ref 3.5–5.2)
ALP SERPL-CCNC: 76 U/L (ref 55–135)
ALT SERPL W/O P-5'-P-CCNC: 19 U/L (ref 10–44)
ANION GAP SERPL CALC-SCNC: 10 MMOL/L (ref 8–16)
AST SERPL-CCNC: 17 U/L (ref 10–40)
BACTERIA GENITAL QL WET PREP: ABNORMAL
BASOPHILS # BLD AUTO: 0.08 K/UL (ref 0–0.2)
BASOPHILS NFR BLD: 0.7 % (ref 0–1.9)
BILIRUB SERPL-MCNC: 0.3 MG/DL (ref 0.1–1)
BILIRUB UR QL STRIP: NEGATIVE
BUN SERPL-MCNC: 9 MG/DL (ref 8–23)
CALCIUM SERPL-MCNC: 9.5 MG/DL (ref 8.7–10.5)
CHLORIDE SERPL-SCNC: 103 MMOL/L (ref 95–110)
CLARITY UR: CLEAR
CLUE CELLS VAG QL WET PREP: ABNORMAL
CO2 SERPL-SCNC: 26 MMOL/L (ref 23–29)
COLOR UR: YELLOW
CREAT SERPL-MCNC: 0.8 MG/DL (ref 0.5–1.4)
DIFFERENTIAL METHOD: ABNORMAL
EOSINOPHIL # BLD AUTO: 0.2 K/UL (ref 0–0.5)
EOSINOPHIL NFR BLD: 1.7 % (ref 0–8)
ERYTHROCYTE [DISTWIDTH] IN BLOOD BY AUTOMATED COUNT: 13.7 % (ref 11.5–14.5)
EST. GFR  (AFRICAN AMERICAN): >60 ML/MIN/1.73 M^2
EST. GFR  (NON AFRICAN AMERICAN): >60 ML/MIN/1.73 M^2
FILAMENT FUNGI VAG WET PREP-#/AREA: ABNORMAL
GLUCOSE SERPL-MCNC: 129 MG/DL (ref 70–110)
GLUCOSE UR QL STRIP: NEGATIVE
HCT VFR BLD AUTO: 41.7 % (ref 37–48.5)
HGB BLD-MCNC: 13.4 G/DL (ref 12–16)
HGB UR QL STRIP: NEGATIVE
HYALINE CASTS #/AREA URNS LPF: 1 /LPF
IMM GRANULOCYTES # BLD AUTO: 0.03 K/UL (ref 0–0.04)
IMM GRANULOCYTES NFR BLD AUTO: 0.3 % (ref 0–0.5)
KETONES UR QL STRIP: NEGATIVE
LEUKOCYTE ESTERASE UR QL STRIP: ABNORMAL
LIPASE SERPL-CCNC: 10 U/L (ref 4–60)
LYMPHOCYTES # BLD AUTO: 2.9 K/UL (ref 1–4.8)
LYMPHOCYTES NFR BLD: 25.7 % (ref 18–48)
MCH RBC QN AUTO: 27.6 PG (ref 27–31)
MCHC RBC AUTO-ENTMCNC: 32.1 G/DL (ref 32–36)
MCV RBC AUTO: 86 FL (ref 82–98)
MICROSCOPIC COMMENT: NORMAL
MONOCYTES # BLD AUTO: 0.7 K/UL (ref 0.3–1)
MONOCYTES NFR BLD: 6.3 % (ref 4–15)
NEUTROPHILS # BLD AUTO: 7.4 K/UL (ref 1.8–7.7)
NEUTROPHILS NFR BLD: 65.3 % (ref 38–73)
NITRITE UR QL STRIP: NEGATIVE
NRBC BLD-RTO: 0 /100 WBC
PH UR STRIP: 5 [PH] (ref 5–8)
PLATELET # BLD AUTO: 424 K/UL (ref 150–450)
PMV BLD AUTO: 8.8 FL (ref 9.2–12.9)
POTASSIUM SERPL-SCNC: 4.4 MMOL/L (ref 3.5–5.1)
PROT SERPL-MCNC: 8.1 G/DL (ref 6–8.4)
PROT UR QL STRIP: ABNORMAL
RBC # BLD AUTO: 4.85 M/UL (ref 4–5.4)
RBC #/AREA URNS HPF: 1 /HPF (ref 0–4)
SARS-COV-2 RDRP RESP QL NAA+PROBE: NEGATIVE
SODIUM SERPL-SCNC: 139 MMOL/L (ref 136–145)
SP GR UR STRIP: 1.03 (ref 1–1.03)
SPECIMEN SOURCE: ABNORMAL
SQUAMOUS #/AREA URNS HPF: 1 /HPF
T VAGINALIS GENITAL QL WET PREP: ABNORMAL
URN SPEC COLLECT METH UR: ABNORMAL
UROBILINOGEN UR STRIP-ACNC: NEGATIVE EU/DL
WBC # BLD AUTO: 11.35 K/UL (ref 3.9–12.7)
WBC #/AREA URNS HPF: 3 /HPF (ref 0–5)
WBC #/AREA VAG WET PREP: ABNORMAL
YEAST GENITAL QL WET PREP: ABNORMAL

## 2022-07-06 PROCEDURE — 85025 COMPLETE CBC W/AUTO DIFF WBC: CPT | Performed by: NURSE PRACTITIONER

## 2022-07-06 PROCEDURE — 87591 N.GONORRHOEAE DNA AMP PROB: CPT | Performed by: EMERGENCY MEDICINE

## 2022-07-06 PROCEDURE — 81000 URINALYSIS NONAUTO W/SCOPE: CPT | Performed by: NURSE PRACTITIONER

## 2022-07-06 PROCEDURE — 80053 COMPREHEN METABOLIC PANEL: CPT | Performed by: NURSE PRACTITIONER

## 2022-07-06 PROCEDURE — 20600001 HC STEP DOWN PRIVATE ROOM

## 2022-07-06 PROCEDURE — 96368 THER/DIAG CONCURRENT INF: CPT

## 2022-07-06 PROCEDURE — 63600175 PHARM REV CODE 636 W HCPCS: Performed by: EMERGENCY MEDICINE

## 2022-07-06 PROCEDURE — S0030 INJECTION, METRONIDAZOLE: HCPCS | Performed by: EMERGENCY MEDICINE

## 2022-07-06 PROCEDURE — 25000003 PHARM REV CODE 250: Performed by: EMERGENCY MEDICINE

## 2022-07-06 PROCEDURE — U0002 COVID-19 LAB TEST NON-CDC: HCPCS | Performed by: EMERGENCY MEDICINE

## 2022-07-06 PROCEDURE — 83690 ASSAY OF LIPASE: CPT | Performed by: NURSE PRACTITIONER

## 2022-07-06 PROCEDURE — 87491 CHLMYD TRACH DNA AMP PROBE: CPT | Performed by: EMERGENCY MEDICINE

## 2022-07-06 PROCEDURE — 99284 EMERGENCY DEPT VISIT MOD MDM: CPT | Mod: 25

## 2022-07-06 PROCEDURE — 87210 SMEAR WET MOUNT SALINE/INK: CPT | Performed by: EMERGENCY MEDICINE

## 2022-07-06 PROCEDURE — 96365 THER/PROPH/DIAG IV INF INIT: CPT

## 2022-07-06 PROCEDURE — 96375 TX/PRO/DX INJ NEW DRUG ADDON: CPT

## 2022-07-06 PROCEDURE — 96367 TX/PROPH/DG ADDL SEQ IV INF: CPT

## 2022-07-06 RX ORDER — ONDANSETRON 4 MG/1
4 TABLET, ORALLY DISINTEGRATING ORAL
Status: COMPLETED | OUTPATIENT
Start: 2022-07-06 | End: 2022-07-06

## 2022-07-06 RX ORDER — CIPROFLOXACIN 2 MG/ML
400 INJECTION, SOLUTION INTRAVENOUS
Status: DISCONTINUED | OUTPATIENT
Start: 2022-07-06 | End: 2022-07-06 | Stop reason: HOSPADM

## 2022-07-06 RX ORDER — ACETAMINOPHEN 325 MG/1
650 TABLET ORAL EVERY 8 HOURS PRN
Status: DISCONTINUED | OUTPATIENT
Start: 2022-07-07 | End: 2022-07-13 | Stop reason: HOSPADM

## 2022-07-06 RX ORDER — LEVOTHYROXINE SODIUM 75 UG/1
75 TABLET ORAL
Status: DISCONTINUED | OUTPATIENT
Start: 2022-07-07 | End: 2022-07-13 | Stop reason: HOSPADM

## 2022-07-06 RX ORDER — HYDROCHLOROTHIAZIDE 25 MG/1
25 TABLET ORAL DAILY
Status: DISCONTINUED | OUTPATIENT
Start: 2022-07-07 | End: 2022-07-13 | Stop reason: HOSPADM

## 2022-07-06 RX ORDER — LISINOPRIL 20 MG/1
20 TABLET ORAL DAILY
Status: DISCONTINUED | OUTPATIENT
Start: 2022-07-07 | End: 2022-07-07

## 2022-07-06 RX ORDER — METRONIDAZOLE 500 MG/100ML
500 INJECTION, SOLUTION INTRAVENOUS
Status: DISCONTINUED | OUTPATIENT
Start: 2022-07-06 | End: 2022-07-06 | Stop reason: HOSPADM

## 2022-07-06 RX ORDER — MORPHINE SULFATE 4 MG/ML
4 INJECTION, SOLUTION INTRAMUSCULAR; INTRAVENOUS
Status: COMPLETED | OUTPATIENT
Start: 2022-07-06 | End: 2022-07-06

## 2022-07-06 RX ORDER — ACETAMINOPHEN 325 MG/1
650 TABLET ORAL EVERY 4 HOURS PRN
Status: DISCONTINUED | OUTPATIENT
Start: 2022-07-07 | End: 2022-07-13 | Stop reason: HOSPADM

## 2022-07-06 RX ORDER — MORPHINE SULFATE 4 MG/ML
4 INJECTION, SOLUTION INTRAMUSCULAR; INTRAVENOUS
Status: DISCONTINUED | OUTPATIENT
Start: 2022-07-06 | End: 2022-07-06 | Stop reason: HOSPADM

## 2022-07-06 RX ORDER — ENOXAPARIN SODIUM 100 MG/ML
40 INJECTION SUBCUTANEOUS EVERY 24 HOURS
Status: DISCONTINUED | OUTPATIENT
Start: 2022-07-07 | End: 2022-07-09

## 2022-07-06 RX ADMIN — METRONIDAZOLE 500 MG: 500 INJECTION, SOLUTION INTRAVENOUS at 06:07

## 2022-07-06 RX ADMIN — ONDANSETRON 4 MG: 4 TABLET, ORALLY DISINTEGRATING ORAL at 01:07

## 2022-07-06 RX ADMIN — PROMETHAZINE HYDROCHLORIDE 12.5 MG: 25 INJECTION INTRAMUSCULAR; INTRAVENOUS at 07:07

## 2022-07-06 RX ADMIN — CIPROFLOXACIN 400 MG: 2 INJECTION, SOLUTION INTRAVENOUS at 06:07

## 2022-07-06 RX ADMIN — MORPHINE SULFATE 4 MG: 4 INJECTION INTRAVENOUS at 01:07

## 2022-07-06 NOTE — ED PROVIDER NOTES
SCRIBE #1 NOTE: I, Celina Ortega, am scribing for, and in the presence of, Jaren Gomez MD. I have scribed the entire note.      History      Chief Complaint   Patient presents with    Abdominal Pain     And N/V x 1 month; hx of diverticulitis       Review of patient's allergies indicates:  No Known Allergies     HPI   HPI    7/6/2022, 1:22 PM   History obtained from the patient      History of Present Illness: Marlyn Gary is a 61 y.o. female patient with a PMHx of diverticulitis who presents to the Emergency Department for LLQ abdominal pain which onset gradually approximately 1 month PTA. On 6/03/2022, the pt was seen in the ED for the same complaint and discharged with a prescription for a 7 day course of Cipro and Flagyl. After she finished her 7 day course of Cipro and Flagyl, the pt reports that she saw her PCP because her sxs were still present and was prescribed another 7 day course of abx. After her second course of abx, the pt reports that her sxs were still present, so she came to the ED again on 7/2/2022. On 7/2/2022, the pt was discharged from the ED with a prescription of Augmentin and [place] and instructions to follow up with Gastroenterology. Yesterday, the pt attempted to make an appointment with Dr. Cole (Colon an Rectal Surgery), but her appointment was made for 7/13/2022 which the pt reports is too long to wait, so she came to the ED today. Symptoms are constant and moderate in severity. No mitigating or exacerbating factors reported. Associated sxs include nausea, diarrhea, and a headache. Patient denies any fever, chills, vomiting, blood in stool, CP, SOB, weakness, and all other sxs at this time. Pt reports that she has had these sxs in the past and saw a Gastroenterologist for the sxs. No further complaints or concerns at this time.         Arrival mode: Personal vehicle     PCP: RAY Rick       Past Medical History:  Past Medical History:   Diagnosis Date     Diverticular disease of large intestine without perforation or abscess     HTN (hypertension)     Thyroid disease     Type 2 diabetes mellitus with unspecified diabetic retinopathy without macular edema     Unspecified glaucoma        Past Surgical History:  Past Surgical History:   Procedure Laterality Date    EXTRACTION OF CATARACT Bilateral     HEMORRHOID SURGERY      HYSTERECTOMY      REDUCTION OF BOTH BREASTS           Family History:  History reviewed. No pertinent family history.    Social History:  Social History     Tobacco Use    Smoking status: Never Smoker    Smokeless tobacco: Never Used   Substance and Sexual Activity    Alcohol use: Never    Drug use: Never    Sexual activity: Not on file       ROS   Review of Systems   Constitutional: Negative for chills and fever.   HENT: Negative for sore throat.    Respiratory: Negative for shortness of breath.    Cardiovascular: Negative for chest pain.   Gastrointestinal: Positive for abdominal pain (LLQ), diarrhea and nausea. Negative for blood in stool and vomiting.   Genitourinary: Negative for dysuria.   Musculoskeletal: Negative for back pain.   Skin: Negative for rash.   Neurological: Positive for headaches. Negative for weakness.   Hematological: Does not bruise/bleed easily.   All other systems reviewed and are negative.    Physical Exam      Initial Vitals [07/06/22 1044]   BP Pulse Resp Temp SpO2   (!) 137/92 80 20 97.5 °F (36.4 °C) 97 %      MAP       --          Physical Exam  Nursing Notes and Vital Signs Reviewed.  Constitutional: Patient is in no acute distress. Well-developed and well-nourished.  Head: Atraumatic. Normocephalic.  Eyes: PERRL. EOM intact. Conjunctivae are not pale. No scleral icterus.  ENT: Mucous membranes are moist. Oropharynx is clear and symmetric.    Neck: Supple. Full ROM. No lymphadenopathy.  Cardiovascular: Regular rate. Regular rhythm. No murmurs, rubs, or gallops. Distal pulses are 2+ and  symmetric.  Pulmonary/Chest: No respiratory distress. Clear to auscultation bilaterally. No wheezing or rales.  Abdominal: Soft and non-distended.  There is LLQ tenderness.  No rebound, guarding, or rigidity.   Musculoskeletal: Moves all extremities. No obvious deformities. No edema.  Skin: Warm and dry.  Neurological:  Alert, awake, and appropriate.  Normal speech.  No acute focal neurological deficits are appreciated.  Psychiatric: Normal affect. Good eye contact. Appropriate in content.  Pelvic: A female chaperone was present for this examination. Nl external inspection. No lesions or abnormalities were visible on the labia majora or minora. There is no blood in the vaginal vault. There is homogenous white discharge in the vaginal vault. No feculent material seen. No bowel seen. No obvious fistula.     ED Course    Procedures  ED Vital Signs:  Vitals:    07/06/22 1359 07/06/22 1430 07/06/22 1515 07/06/22 1600   BP:  (!) 183/93 (!) 197/99 (!) 202/92   Pulse:  67 64 67   Resp: 17 16     Temp:  97.5 °F (36.4 °C)     TempSrc:       SpO2:  97% 98% 97%   Weight:       Height:        07/06/22 1645 07/06/22 1800 07/06/22 1830 07/06/22 1924   BP: (!) 199/88 (!) 156/93 (!) 193/82 (!) 181/84   Pulse: 70 72 77 73   Resp: 16  16    Temp:       TempSrc:       SpO2: 97% 97% 99% 97%   Weight:       Height:        07/06/22 1931 07/06/22 1943 07/06/22 2003 07/06/22 2020   BP:  (!) 181/82 (!) 177/74    Pulse: 74 73 69 71   Resp:       Temp:       TempSrc:       SpO2: 98% 96% 97% 97%   Weight:       Height:        07/06/22 2023 07/06/22 2035 07/06/22 2039   BP: (!) 180/84  (!) 180/84   Pulse: 71 74 74   Resp:   16   Temp:   98 °F (36.7 °C)   TempSrc:      SpO2: 97% 98%    Weight:      Height:          Abnormal Lab Results:  Labs Reviewed   VAGINAL SCREEN - Abnormal; Notable for the following components:       Result Value    Clue Cells Occasional (*)     Budding Yeast Few (*)     Fungal Hyphae Many (*)     WBC - Vaginal Screen Many  (*)     Bacteria - Vaginal Screen Many (*)     All other components within normal limits    Narrative:     Release to patient->Immediate   CBC W/ AUTO DIFFERENTIAL - Abnormal; Notable for the following components:    MPV 8.8 (*)     All other components within normal limits   COMPREHENSIVE METABOLIC PANEL - Abnormal; Notable for the following components:    Glucose 129 (*)     All other components within normal limits   URINALYSIS, REFLEX TO URINE CULTURE - Abnormal; Notable for the following components:    Protein, UA Trace (*)     Leukocytes, UA 1+ (*)     All other components within normal limits    Narrative:     Specimen Source->Urine   C. TRACHOMATIS/N. GONORRHOEAE BY AMP DNA   LIPASE   URINALYSIS MICROSCOPIC    Narrative:     Specimen Source->Urine   SARS-COV-2 RNA AMPLIFICATION, QUAL        All Lab Results:  Results for orders placed or performed during the hospital encounter of 07/06/22   Vaginal Screen    Specimen: Vagina   Result Value Ref Range    Trichomonas None None    Clue Cells Occasional (A) None    Budding Yeast Few (A) None    Fungal Hyphae Many (A) None    WBC - Vaginal Screen Many (A) None    Bacteria - Vaginal Screen Many (A) None    Wet Prep Source Vagina    CBC auto differential   Result Value Ref Range    WBC 11.35 3.90 - 12.70 K/uL    RBC 4.85 4.00 - 5.40 M/uL    Hemoglobin 13.4 12.0 - 16.0 g/dL    Hematocrit 41.7 37.0 - 48.5 %    MCV 86 82 - 98 fL    MCH 27.6 27.0 - 31.0 pg    MCHC 32.1 32.0 - 36.0 g/dL    RDW 13.7 11.5 - 14.5 %    Platelets 424 150 - 450 K/uL    MPV 8.8 (L) 9.2 - 12.9 fL    Immature Granulocytes 0.3 0.0 - 0.5 %    Gran # (ANC) 7.4 1.8 - 7.7 K/uL    Immature Grans (Abs) 0.03 0.00 - 0.04 K/uL    Lymph # 2.9 1.0 - 4.8 K/uL    Mono # 0.7 0.3 - 1.0 K/uL    Eos # 0.2 0.0 - 0.5 K/uL    Baso # 0.08 0.00 - 0.20 K/uL    nRBC 0 0 /100 WBC    Gran % 65.3 38.0 - 73.0 %    Lymph % 25.7 18.0 - 48.0 %    Mono % 6.3 4.0 - 15.0 %    Eosinophil % 1.7 0.0 - 8.0 %    Basophil % 0.7 0.0 - 1.9  %    Differential Method Automated    Comprehensive metabolic panel   Result Value Ref Range    Sodium 139 136 - 145 mmol/L    Potassium 4.4 3.5 - 5.1 mmol/L    Chloride 103 95 - 110 mmol/L    CO2 26 23 - 29 mmol/L    Glucose 129 (H) 70 - 110 mg/dL    BUN 9 8 - 23 mg/dL    Creatinine 0.8 0.5 - 1.4 mg/dL    Calcium 9.5 8.7 - 10.5 mg/dL    Total Protein 8.1 6.0 - 8.4 g/dL    Albumin 3.7 3.5 - 5.2 g/dL    Total Bilirubin 0.3 0.1 - 1.0 mg/dL    Alkaline Phosphatase 76 55 - 135 U/L    AST 17 10 - 40 U/L    ALT 19 10 - 44 U/L    Anion Gap 10 8 - 16 mmol/L    eGFR if African American >60 >60 mL/min/1.73 m^2    eGFR if non African American >60 >60 mL/min/1.73 m^2   Lipase   Result Value Ref Range    Lipase 10 4 - 60 U/L   Urinalysis, Reflex to Urine Culture Urine, Clean Catch    Specimen: Urine   Result Value Ref Range    Specimen UA Urine, Clean Catch     Color, UA Yellow Yellow, Straw, Maci    Appearance, UA Clear Clear    pH, UA 5.0 5.0 - 8.0    Specific Gravity, UA 1.030 1.005 - 1.030    Protein, UA Trace (A) Negative    Glucose, UA Negative Negative    Ketones, UA Negative Negative    Bilirubin (UA) Negative Negative    Occult Blood UA Negative Negative    Nitrite, UA Negative Negative    Urobilinogen, UA Negative <2.0 EU/dL    Leukocytes, UA 1+ (A) Negative   Urinalysis Microscopic   Result Value Ref Range    RBC, UA 1 0 - 4 /hpf    WBC, UA 3 0 - 5 /hpf    Squam Epithel, UA 1 /hpf    Hyaline Casts, UA 1 0-1/lpf /lpf    Microscopic Comment SEE COMMENT    COVID-19 Rapid Screening   Result Value Ref Range    SARS-CoV-2 RNA, Amplification, Qual Negative Negative         Imaging Results:  Imaging Results           CT Abdomen Pelvis  Without Contrast (Final result)  Result time 07/06/22 15:16:29    Final result by Maco Sanchez MD (07/06/22 15:16:29)                 Impression:      Acute diverticulitis findings with inflammatory and free fluid extension to the left superolateral aspect of the vagina, similar when  compared to the prior examination.  Questionable fistulization.  Consider further follow-up with colonoscopy approximately 6-8 weeks status post resolution of the acute process.    Additional stable findings as above.    This report was flagged in Epic as abnormal.      Electronically signed by: Maco Sanchez  Date:    07/06/2022  Time:    15:16             Narrative:    EXAMINATION:  CT ABDOMEN PELVIS WITHOUT CONTRAST    CLINICAL HISTORY:  Abdominal abscess/infection suspected;    TECHNIQUE:  Low dose axial images, sagittal and coronal reformations were obtained from the lung bases to the pubic symphysis.  Oral contrast was used.  All CT scans at this location are performed using dose modulation techniques as appropriate to a performed exam including the following: Automated exposure control; adjustment of the mA and/or kV according to patient size (this includes techniques or standardized protocols for targeted exams where dose is matched to indication/reason for exam; i. e. extremities or head); use of iterative reconstruction technique.    COMPARISON:  CT abdomen pelvis from 07/02/2022    FINDINGS:  Small left hepatic cyst.  Cholecystectomy status peer normal bile ducts.  Pancreas, spleen, adrenal glands, kidneys, ureters, and bladder demonstrate no significant abnormality.    Sigmoid diverticulosis with fat stranding adjacent to multiple proximal sigmoid diverticula.  Disorganized fluid and fat stranding extend to the left superolateral aspect of the vagina.  Remaining bowel demonstrates no obstruction or inflammation.  No free air.  No free fluid throughout the remaining abdomen.  Upper limits normal size the appendix without convincing evidence of acute appendicitis.    Nonaneurysmal aorta.  Mildly enlarged left iliac chain lymph nodes measuring up to 1.1 cm.                                        The Emergency Provider reviewed the vital signs and test results, which are outlined above.    ED Discussion      4:20 PM: Discussed pt's case with Dr. Husain (General Surgery) who states patient will need colorectal surgery evaluation which we do not have currently at this facility. Discussed having patient transferred to facility where this is available.     5:10 PM: Consult with Dr. Mallory (Colon and Rectal Surgery) at [facility] concerning pt. There are no Colon and Rectal Surgery services, which the patient requires, offered at [facility] at this time. Dr. Mallory expresses understanding and will accept transfer for Colon and Rectal Surgery services.  Accepting Facility: [facility]  Accepting Physician:  Dr. Mallory     5:11 PM: Re-evaluated pt. Patient says she does not want to go home today given her prolonged outpatient course. Informed pt and family that there are no Colon and Rectal Surgery services available at this time. I have discussed test results, shared treatment plan, and the need for transfer with patient and family at bedside. All historical, clinical, radiographic, and laboratory findings were reviewed with the patient/family in detail. Patient will be transferred by [facility] services with BLS care required en route. Patient understands that there could be unforeseen motor vehicle accidents or loss of vital signs that could result in potential death or permanent disability. Pt and family express understanding at this time and agree with all information. All questions answered. Pt and family have no further questions or concerns at this time. Pt is ready for transfer.         ED Course as of 07/06/22 2106 Wed Jul 06, 2022   1620 Per Dr. Pepper's, will need colo-rectal surgery consultation.  [BA]      ED Course User Index  [BA] Jaren Gomez MD       ED Medication(s):  Medications   morphine injection 4 mg (0 mg Intravenous Hold 7/6/22 1530)   metronidazole IVPB 500 mg (0 mg Intravenous Stopped 7/6/22 1933)   ciprofloxacin (CIPRO)400mg/200ml D5W IVPB 400 mg (0 mg Intravenous Stopped  7/6/22 1933)   ondansetron disintegrating tablet 4 mg (4 mg Oral Given 7/6/22 1358)   morphine injection 4 mg (4 mg Intravenous Given 7/6/22 1359)   promethazine (PHENERGAN) 12.5 mg in dextrose 5 % 50 mL IVPB (0 mg Intravenous Stopped 7/6/22 2006)           New Prescriptions    No medications on file         Medical Decision Making    Medical Decision Making:   Clinical Tests:   Lab Tests: Ordered and Reviewed  Radiological Study: Ordered and Reviewed           Scribe Attestation:   Scribe #1: I performed the above scribed service and the documentation accurately describes the services I performed. I attest to the accuracy of the note.    Attending:   Physician Attestation Statement for Scribe #1: I, Jaren Gomez MD, personally performed the services described in this documentation, as scribed by Celina Ortega, in my presence, and it is both accurate and complete.          Clinical Impression       ICD-10-CM ICD-9-CM   1. Diverticulitis  K57.92 562.11   2. Intractable abdominal pain  R10.9 789.00       Disposition:   Disposition: Transferred  Condition: Stable         Jaren Gomez MD  07/06/22 9319

## 2022-07-06 NOTE — ED NOTES
Pt was educated not to leave the hospital with an IV in place. The pt did understand this communication

## 2022-07-06 NOTE — FIRST PROVIDER EVALUATION
Medical screening exam completed.  I have conducted a focused provider triage encounter, findings are as follows:    Brief history of present illness:  Abdominal pain with n/v. Hx of diverticulitis     There were no vitals filed for this visit.    Pertinent physical exam:  uncomfortable    Brief workup plan:  Labs, imaging as needed, meds      Preliminary workup initiated; this workup will be continued and followed by the physician or advanced practice provider that is assigned to the patient when roomed.

## 2022-07-07 LAB
ALBUMIN SERPL BCP-MCNC: 3.1 G/DL (ref 3.5–5.2)
ALP SERPL-CCNC: 73 U/L (ref 55–135)
ALT SERPL W/O P-5'-P-CCNC: 17 U/L (ref 10–44)
ANION GAP SERPL CALC-SCNC: 7 MMOL/L (ref 8–16)
AST SERPL-CCNC: 15 U/L (ref 10–40)
BASOPHILS # BLD AUTO: 0.07 K/UL (ref 0–0.2)
BASOPHILS NFR BLD: 0.6 % (ref 0–1.9)
BILIRUB SERPL-MCNC: 0.5 MG/DL (ref 0.1–1)
BUN SERPL-MCNC: 7 MG/DL (ref 8–23)
CALCIUM SERPL-MCNC: 9.3 MG/DL (ref 8.7–10.5)
CHLORIDE SERPL-SCNC: 101 MMOL/L (ref 95–110)
CO2 SERPL-SCNC: 28 MMOL/L (ref 23–29)
CREAT SERPL-MCNC: 0.7 MG/DL (ref 0.5–1.4)
CRP SERPL-MCNC: 18.5 MG/L (ref 0–8.2)
CRP SERPL-MCNC: 22.6 MG/L (ref 0–8.2)
DIFFERENTIAL METHOD: NORMAL
EOSINOPHIL # BLD AUTO: 0.1 K/UL (ref 0–0.5)
EOSINOPHIL NFR BLD: 1.3 % (ref 0–8)
ERYTHROCYTE [DISTWIDTH] IN BLOOD BY AUTOMATED COUNT: 13.9 % (ref 11.5–14.5)
EST. GFR  (AFRICAN AMERICAN): >60 ML/MIN/1.73 M^2
EST. GFR  (NON AFRICAN AMERICAN): >60 ML/MIN/1.73 M^2
GLUCOSE SERPL-MCNC: 100 MG/DL (ref 70–110)
HCT VFR BLD AUTO: 38.3 % (ref 37–48.5)
HGB BLD-MCNC: 12.5 G/DL (ref 12–16)
IMM GRANULOCYTES # BLD AUTO: 0.03 K/UL (ref 0–0.04)
IMM GRANULOCYTES NFR BLD AUTO: 0.3 % (ref 0–0.5)
LYMPHOCYTES # BLD AUTO: 3.2 K/UL (ref 1–4.8)
LYMPHOCYTES NFR BLD: 30 % (ref 18–48)
MCH RBC QN AUTO: 28.5 PG (ref 27–31)
MCHC RBC AUTO-ENTMCNC: 32.6 G/DL (ref 32–36)
MCV RBC AUTO: 87 FL (ref 82–98)
MONOCYTES # BLD AUTO: 0.8 K/UL (ref 0.3–1)
MONOCYTES NFR BLD: 7.5 % (ref 4–15)
NEUTROPHILS # BLD AUTO: 6.5 K/UL (ref 1.8–7.7)
NEUTROPHILS NFR BLD: 60.3 % (ref 38–73)
NRBC BLD-RTO: 0 /100 WBC
PLATELET # BLD AUTO: 377 K/UL (ref 150–450)
PMV BLD AUTO: 9.2 FL (ref 9.2–12.9)
POCT GLUCOSE: 102 MG/DL (ref 70–110)
POCT GLUCOSE: 112 MG/DL (ref 70–110)
POCT GLUCOSE: 121 MG/DL (ref 70–110)
POCT GLUCOSE: 128 MG/DL (ref 70–110)
POTASSIUM SERPL-SCNC: 4.1 MMOL/L (ref 3.5–5.1)
PROT SERPL-MCNC: 7.2 G/DL (ref 6–8.4)
RBC # BLD AUTO: 4.39 M/UL (ref 4–5.4)
SODIUM SERPL-SCNC: 136 MMOL/L (ref 136–145)
WBC # BLD AUTO: 10.78 K/UL (ref 3.9–12.7)

## 2022-07-07 PROCEDURE — 85025 COMPLETE CBC W/AUTO DIFF WBC: CPT | Performed by: STUDENT IN AN ORGANIZED HEALTH CARE EDUCATION/TRAINING PROGRAM

## 2022-07-07 PROCEDURE — 80053 COMPREHEN METABOLIC PANEL: CPT | Performed by: STUDENT IN AN ORGANIZED HEALTH CARE EDUCATION/TRAINING PROGRAM

## 2022-07-07 PROCEDURE — 25000003 PHARM REV CODE 250: Performed by: SURGERY

## 2022-07-07 PROCEDURE — 86140 C-REACTIVE PROTEIN: CPT | Mod: 91 | Performed by: STUDENT IN AN ORGANIZED HEALTH CARE EDUCATION/TRAINING PROGRAM

## 2022-07-07 PROCEDURE — 99232 SBSQ HOSP IP/OBS MODERATE 35: CPT | Mod: ,,, | Performed by: NURSE PRACTITIONER

## 2022-07-07 PROCEDURE — 63600175 PHARM REV CODE 636 W HCPCS: Performed by: SURGERY

## 2022-07-07 PROCEDURE — 25000003 PHARM REV CODE 250: Performed by: STUDENT IN AN ORGANIZED HEALTH CARE EDUCATION/TRAINING PROGRAM

## 2022-07-07 PROCEDURE — 25000003 PHARM REV CODE 250: Performed by: NURSE PRACTITIONER

## 2022-07-07 PROCEDURE — 86140 C-REACTIVE PROTEIN: CPT | Performed by: STUDENT IN AN ORGANIZED HEALTH CARE EDUCATION/TRAINING PROGRAM

## 2022-07-07 PROCEDURE — 63600175 PHARM REV CODE 636 W HCPCS: Performed by: STUDENT IN AN ORGANIZED HEALTH CARE EDUCATION/TRAINING PROGRAM

## 2022-07-07 PROCEDURE — 20600001 HC STEP DOWN PRIVATE ROOM

## 2022-07-07 PROCEDURE — 36415 COLL VENOUS BLD VENIPUNCTURE: CPT | Performed by: STUDENT IN AN ORGANIZED HEALTH CARE EDUCATION/TRAINING PROGRAM

## 2022-07-07 PROCEDURE — 99232 PR SUBSEQUENT HOSPITAL CARE,LEVL II: ICD-10-PCS | Mod: ,,, | Performed by: NURSE PRACTITIONER

## 2022-07-07 RX ORDER — OXYCODONE HYDROCHLORIDE 5 MG/1
5 TABLET ORAL
Status: DISCONTINUED | OUTPATIENT
Start: 2022-07-07 | End: 2022-07-13 | Stop reason: HOSPADM

## 2022-07-07 RX ORDER — GLUCAGON 1 MG
1 KIT INJECTION
Status: DISCONTINUED | OUTPATIENT
Start: 2022-07-07 | End: 2022-07-13 | Stop reason: HOSPADM

## 2022-07-07 RX ORDER — INSULIN ASPART 100 [IU]/ML
0-5 INJECTION, SOLUTION INTRAVENOUS; SUBCUTANEOUS
Status: DISCONTINUED | OUTPATIENT
Start: 2022-07-07 | End: 2022-07-13 | Stop reason: HOSPADM

## 2022-07-07 RX ORDER — LATANOPROST 50 UG/ML
1 SOLUTION/ DROPS OPHTHALMIC NIGHTLY
Status: DISCONTINUED | OUTPATIENT
Start: 2022-07-07 | End: 2022-07-13 | Stop reason: HOSPADM

## 2022-07-07 RX ORDER — OXYCODONE HYDROCHLORIDE 10 MG/1
10 TABLET ORAL
Status: DISCONTINUED | OUTPATIENT
Start: 2022-07-07 | End: 2022-07-13 | Stop reason: HOSPADM

## 2022-07-07 RX ORDER — IBUPROFEN 200 MG
16 TABLET ORAL
Status: DISCONTINUED | OUTPATIENT
Start: 2022-07-07 | End: 2022-07-13 | Stop reason: HOSPADM

## 2022-07-07 RX ORDER — IBUPROFEN 200 MG
24 TABLET ORAL
Status: DISCONTINUED | OUTPATIENT
Start: 2022-07-07 | End: 2022-07-13 | Stop reason: HOSPADM

## 2022-07-07 RX ADMIN — OXYCODONE HYDROCHLORIDE 10 MG: 10 TABLET ORAL at 09:07

## 2022-07-07 RX ADMIN — OXYCODONE 5 MG: 5 TABLET ORAL at 02:07

## 2022-07-07 RX ADMIN — HYDROCHLOROTHIAZIDE 25 MG: 25 TABLET ORAL at 08:07

## 2022-07-07 RX ADMIN — LATANOPROST 1 DROP: 50 SOLUTION OPHTHALMIC at 09:07

## 2022-07-07 RX ADMIN — PIPERACILLIN SODIUM AND TAZOBACTAM SODIUM 4.5 G: 4; .5 INJECTION, POWDER, LYOPHILIZED, FOR SOLUTION INTRAVENOUS at 10:07

## 2022-07-07 RX ADMIN — LISINOPRIL 20 MG: 20 TABLET ORAL at 08:07

## 2022-07-07 RX ADMIN — OXYCODONE 5 MG: 5 TABLET ORAL at 09:07

## 2022-07-07 RX ADMIN — LEVOTHYROXINE SODIUM 75 MCG: 75 TABLET ORAL at 05:07

## 2022-07-07 RX ADMIN — PIPERACILLIN SODIUM AND TAZOBACTAM SODIUM 4.5 G: 4; .5 INJECTION, POWDER, LYOPHILIZED, FOR SOLUTION INTRAVENOUS at 02:07

## 2022-07-07 RX ADMIN — ENOXAPARIN SODIUM 40 MG: 100 INJECTION SUBCUTANEOUS at 04:07

## 2022-07-07 RX ADMIN — PIPERACILLIN SODIUM AND TAZOBACTAM SODIUM 4.5 G: 4; .5 INJECTION, POWDER, LYOPHILIZED, FOR SOLUTION INTRAVENOUS at 06:07

## 2022-07-07 RX ADMIN — ACETAMINOPHEN 650 MG: 325 TABLET ORAL at 07:07

## 2022-07-07 NOTE — ASSESSMENT & PLAN NOTE
Chronic, controlled.  Latest blood pressure and vitals reviewed-   Temp:  [97.3 °F (36.3 °C)-98.3 °F (36.8 °C)]   Pulse:  [64-80]   Resp:  [16-20]   BP: (121-202)/(72-99)   SpO2:  [95 %-99 %] .   Home meds for hypertension were reviewed and noted below.   Hypertension Medications             benazepriL (LOTENSIN) 20 MG tablet Take 20 mg by mouth once daily.    hydroCHLOROthiazide (HYDRODIURIL) 25 MG tablet Take 25 mg by mouth once daily.          While in the hospital, will manage blood pressure as follows; Continue home antihypertensive regimen    Will utilize p.r.n. blood pressure medication only if patient's blood pressure greater than  180/110 and she develops symptoms such as worsening chest pain or shortness of breath.

## 2022-07-07 NOTE — SUBJECTIVE & OBJECTIVE
Current Facility-Administered Medications on File Prior to Encounter   Medication    [COMPLETED] morphine injection 4 mg    [COMPLETED] ondansetron disintegrating tablet 4 mg    [COMPLETED] promethazine (PHENERGAN) 12.5 mg in dextrose 5 % 50 mL IVPB    [DISCONTINUED] ciprofloxacin (CIPRO)400mg/200ml D5W IVPB 400 mg    [DISCONTINUED] metronidazole IVPB 500 mg    [DISCONTINUED] morphine injection 4 mg     Current Outpatient Medications on File Prior to Encounter   Medication Sig    amoxicillin-clavulanate 875-125mg (AUGMENTIN) 875-125 mg per tablet Take 1 tablet by mouth 2 (two) times daily.    benazepriL (LOTENSIN) 20 MG tablet Take 20 mg by mouth once daily.    bromfenac (PROLENSA) 0.07 % Drop Apply 1 drop to eye once daily. (both eyes)    COMBIGAN 0.2-0.5 % Drop Place 1 drop into both eyes 2 (two) times daily.    difluprednate (DUREZOL) 0.05 % Drop ophthalmic solution Place 1 drop into both eyes 4 (four) times daily.    hydroCHLOROthiazide (HYDRODIURIL) 25 MG tablet Take 25 mg by mouth once daily.    HYDROcodone-acetaminophen (NORCO) 7.5-325 mg per tablet Take 1 tablet by mouth every 6 (six) hours as needed for Pain.    latanoprost 0.005 % ophthalmic solution Place 1 drop into both eyes nightly.    metFORMIN (GLUCOPHAGE) 500 MG tablet Take 500 mg by mouth 2 (two) times daily.    SYNTHROID 75 mcg tablet Take 75 mcg by mouth once daily.       Review of patient's allergies indicates:  No Known Allergies    Past Medical History:   Diagnosis Date    Diverticular disease of large intestine without perforation or abscess     HTN (hypertension)     Thyroid disease     Type 2 diabetes mellitus with unspecified diabetic retinopathy without macular edema     Unspecified glaucoma      Past Surgical History:   Procedure Laterality Date    EXTRACTION OF CATARACT Bilateral     HEMORRHOID SURGERY      HYSTERECTOMY      REDUCTION OF BOTH BREASTS       Family History    None       Tobacco Use    Smoking status: Never Smoker     Smokeless tobacco: Never Used   Substance and Sexual Activity    Alcohol use: Never    Drug use: Never    Sexual activity: Not on file     Review of Systems   Constitutional:  Negative for activity change, chills and fever.   Respiratory:  Negative for shortness of breath.    Cardiovascular:  Negative for chest pain.   Gastrointestinal:  Positive for abdominal pain (LLQ) and diarrhea. Negative for abdominal distention, blood in stool, nausea and vomiting.   Endocrine: Negative for polyuria.   Genitourinary:  Negative for dysuria.   Skin:  Negative for color change.   Hematological:  Does not bruise/bleed easily.   Psychiatric/Behavioral:  Negative for agitation and behavioral problems.    Objective:     Vital Signs (Most Recent):  Temp: 97.3 °F (36.3 °C) (07/06/22 2241)  Pulse: 74 (07/06/22 2241)  Resp: 18 (07/06/22 2241)  BP: (!) 164/77 (07/06/22 2241)  SpO2: 95 % (07/06/22 2241)   Vital Signs (24h Range):  Temp:  [97.3 °F (36.3 °C)-98 °F (36.7 °C)] 97.3 °F (36.3 °C)  Pulse:  [64-80] 74  Resp:  [16-20] 18  SpO2:  [95 %-99 %] 95 %  BP: (137-202)/(74-99) 164/77     Weight: 84.1 kg (185 lb 4.8 oz)  Body mass index is 31.81 kg/m².    Physical Exam  Constitutional:       General: She is not in acute distress.     Appearance: Normal appearance.   HENT:      Mouth/Throat:      Mouth: Mucous membranes are moist.   Eyes:      Extraocular Movements: Extraocular movements intact.   Cardiovascular:      Rate and Rhythm: Normal rate and regular rhythm.   Pulmonary:      Effort: Pulmonary effort is normal. No respiratory distress.      Breath sounds: Normal breath sounds.   Abdominal:      General: There is no distension.      Palpations: Abdomen is soft.      Tenderness: There is abdominal tenderness (LLQ).   Skin:     General: Skin is warm.   Neurological:      Mental Status: She is alert and oriented to person, place, and time. Mental status is at baseline.       Significant Labs:  I have reviewed all pertinent lab results  within the past 24 hours.  CBC:   Recent Labs   Lab 07/06/22  1230   WBC 11.35   RBC 4.85   HGB 13.4   HCT 41.7      MCV 86   MCH 27.6   MCHC 32.1     CMP:   Recent Labs   Lab 07/06/22  1230   *   CALCIUM 9.5   ALBUMIN 3.7   PROT 8.1      K 4.4   CO2 26      BUN 9   CREATININE 0.8   ALKPHOS 76   ALT 19   AST 17   BILITOT 0.3     Microbiology Results (last 7 days)       ** No results found for the last 168 hours. **          Recent Labs   Lab 07/06/22  1241   COLORU Yellow   SPECGRAV 1.030   PHUR 5.0   PROTEINUA Trace*   NITRITE Negative   LEUKOCYTESUR 1+*   UROBILINOGEN Negative   HYALINECASTS 1       AbdCT: Small left hepatic cyst.  Cholecystectomy status peer normal bile ducts.  Pancreas, spleen, adrenal glands, kidneys, ureters, and bladder demonstrate no significant abnormality.     Sigmoid diverticulosis with fat stranding adjacent to multiple proximal sigmoid diverticula.  Disorganized fluid and fat stranding extend to the left superolateral aspect of the vagina.  Remaining bowel demonstrates no obstruction or inflammation.  No free air.  No free fluid throughout the remaining abdomen.  Upper limits normal size the appendix without convincing evidence of acute appendicitis.    Significant Diagnostics:  I have reviewed all pertinent imaging results/findings within the past 24 hours.  I have reviewed and interpreted all pertinent imaging results/findings within the past 24 hours.

## 2022-07-07 NOTE — ASSESSMENT & PLAN NOTE
60 y/o female with PMHx of HTN, DM2, hypothyroidism and diverticulitis transferred from [place] [facility] with findings of acute diverticulitis on CT scan from 7/6/2022    -Diet: CLD, monitor toelrance  -CRP now and in AM draw, CBC and CMP  -Pain: tylenol PRN  -Home meds: lisinopril, hydrochlorothiazide, synthroid  -Low dose insulin sliding scale  -DVT prophylaxis: lovenox

## 2022-07-07 NOTE — PLAN OF CARE
[address] [facility]  Initial Discharge Assessment       Primary Care Provider: RAY Rick    Admission Diagnosis: Diverticulitis [K57.92]    Admission Date: 7/6/2022  Expected Discharge Date: 7/9/2022    Discharge Barriers Identified: None    Payor: BLUE CROSS BLUE SHIELD / Plan: [facility] FEDERAL BASIC / Product Type: PPO /     Extended Emergency Contact Information  Primary Emergency Contact: Jaron Gary  Mobile Phone: [phone]  Relation: Spouse  Preferred language: English   needed? No    Discharge Plan A: Home with family  Discharge Plan B: Home      [id number] - [place] - [address] AT [address] [facility] [address]  [address] [address]  Phone: [phone] Fax: [phone]      Initial Assessment (most recent)     Adult Discharge Assessment - 07/07/22 1157        Discharge Assessment    Assessment Type Discharge Planning Brief Assessment     Confirmed/corrected address, phone number and insurance Yes     Confirmed Demographics Correct on Facesheet     Source of Information patient     When was your last doctors appointment? 06/23/22     Does patient/caregiver understand observation status Yes     Communicated RIGOBERTO with patient/caregiver Yes     Reason For Admission Diverticulitis     Lives With spouse     [facility]     Do you expect to return to your current living situation? Yes     Do you have help at home or someone to help you manage your care at home? Yes     Who are your caregiver(s) and their phone number(s)? Jaron--[phone]     Prior to hospitilization cognitive status: Alert/Oriented     Current cognitive status: Alert/Oriented     Walking or Climbing Stairs Difficulty none     Dressing/Bathing Difficulty none     Home Layout Able to live on 1st floor     Equipment Currently Used at Home none     Readmission within 30 days? No     Patient currently being  followed by outpatient case management? No     Do you currently have service(s) that help you manage your care at home? No     Do you take prescription medications? Yes     Do you have prescription coverage? Yes     Coverage BCBS     Do you have any problems affording any of your prescribed medications? No     Is the patient taking medications as prescribed? yes     Who is going to help you get home at discharge? [phone]     How do you get to doctors appointments? car, drives self;family or friend will provide     Are you on dialysis? No     Do you take coumadin? No     Discharge Plan A Home with family     Discharge Plan B Home     DME Needed Upon Discharge  other (see comments)   Unknown at this time    Discharge Plan discussed with: Patient     Discharge Barriers Identified None        Relationship/Environment    Name(s) of Who Lives With Patient [phone]                   SW spoke to pt. Pt lives at home with [phone] . Post hospital stay  will be pt support person and pt. has transportation at d/c with . There have been no hospitalizations within the last 30 days per pt and pt wife. Verified pt PCP and preferred pharmacy. Pt stated not on Coumadin and is not receiving dialysis. All questions answered regarding case management/ discharge planning , pt verbalized understanding. Discharge booklet with SW contact information given to pt.        Sherlyn Srinivasan LCSW  Case Management/[facility]  [phone]

## 2022-07-07 NOTE — PLAN OF CARE
Problem: Adult Inpatient Plan of Care  Goal: Plan of Care Review  Outcome: Ongoing, Progressing  Goal: Patient-Specific Goal (Individualized)  Outcome: Ongoing, Progressing     Problem: Diabetes Comorbidity  Goal: Blood Glucose Level Within Targeted Range  Outcome: Ongoing, Progressing

## 2022-07-07 NOTE — SUBJECTIVE & OBJECTIVE
Subjective:     Interval History: accepted as transfer tfrom [employer] with diverticulitis, no acute events overnite, no n/v, abd pain, sips of cld    Post-Op Info:  * No surgery found *          Medications:  Continuous Infusions:  Scheduled Meds:   enoxaparin  40 mg Subcutaneous Daily    hydroCHLOROthiazide  25 mg Oral Daily    latanoprost  1 drop Both Eyes Nightly    levothyroxine  75 mcg Oral Before breakfast    piperacillin-tazobactam (ZOSYN) IVPB  4.5 g Intravenous Q8H     PRN Meds:   acetaminophen    acetaminophen    dextrose 10%    dextrose 10%    glucagon (human recombinant)    glucose    glucose    insulin aspart U-100    oxyCODONE    oxyCODONE        Objective:     Vital Signs (Most Recent):  Temp: 98.3 °F (36.8 °C) (07/07/22 0751)  Pulse: 80 (07/07/22 0751)  Resp: 20 (07/07/22 0955)  BP: 121/72 (07/07/22 0751)  SpO2: 97 % (07/07/22 0751) Vital Signs (24h Range):  Temp:  [97.3 °F (36.3 °C)-98.3 °F (36.8 °C)] 98.3 °F (36.8 °C)  Pulse:  [64-80] 80  Resp:  [16-20] 20  SpO2:  [95 %-99 %] 97 %  BP: (121-202)/(72-99) 121/72     Intake/Output - Last 3 Shifts         07/05 0700  07/06 0659 07/06 0700  07/07 0659 07/07 0700  07/08 0659    P.O.  250     I.V. (mL/kg)  0 (0)     Total Intake(mL/kg)  250 (3)     Net  +250            Urine Occurrence  3 x     Stool Occurrence  0 x             Physical Exam  Vitals and nursing note reviewed.   Constitutional:       Appearance: She is well-developed.   HENT:      Head: Normocephalic.   Eyes:      Pupils: Pupils are equal, round, and reactive to light.   Cardiovascular:      Rate and Rhythm: Normal rate and regular rhythm.      Heart sounds: Normal heart sounds.   Pulmonary:      Effort: Pulmonary effort is normal. No respiratory distress.      Breath sounds: Normal breath sounds. No wheezing or rales.   Abdominal:      Palpations: Abdomen is soft. There is no mass.      Tenderness: There is no guarding or rebound.      Comments: Abd pain with light palpation    Skin:     General: Skin is warm and dry.   Neurological:      Mental Status: She is alert and oriented to person, place, and time.   Psychiatric:         Behavior: Behavior normal.         Thought Content: Thought content normal.         Judgment: Judgment normal.           Significant Labs:  BMP (Last 3 Results):   Recent Labs   Lab 07/02/22  1544 07/06/22  1230 07/07/22  0312   GLU 84 129* 100    139 136   K 4.8 4.4 4.1    103 101   CO2 27 26 28   BUN 10 9 7*   CREATININE 0.7 0.8 0.7   CALCIUM 9.8 9.5 9.3     CBC (Last 3 Results):   Recent Labs   Lab 07/02/22  1544 07/06/22  1230 07/07/22  0312   WBC 11.33 11.35 10.78   RBC 5.19 4.85 4.39   HGB 14.3 13.4 12.5   HCT 44.6 41.7 38.3   * 424 377   MCV 86 86 87   MCH 27.6 27.6 28.5   MCHC 32.1 32.1 32.6       Significant Diagnostics:  None

## 2022-07-07 NOTE — H&P
[facility]  General Surgery  History & Physical    Patient Name: Marlyn Gary  MRN: [id number]  Admission Date: 7/6/2022  Attending Physician: Phill Mallory MD   Primary Care Provider: RAY Rick    Patient information was obtained from patient and ER records.     Subjective:     Chief Complaint/Reason for Admission: abdominal pain    History of Present Illness: 62 y/o female with PMHx of HTN, DM2, hypothyroidism and diverticulitis is transferred from [facility] due to LLQ abdominal pain.  Pain is LLQ colicky in nature and associated with diarrhea. Denies nausea, vomiting, blood in stool, or changes in color of stool. Patient refers pain has been present since Saturday (7/2/22) where she had previously visited ER for the same symptoms and was discharged with instructions to visit gastroenterologist. Patient has appointment with Colon and Rectal Surgeon but could not wait until then. While in the ER patient had CT scan that shows acute diverticulitis findings with inflammatory and free fluid extension to the left superolateral aspect of the vagina.      Current Facility-Administered Medications on File Prior to Encounter   Medication    [COMPLETED] morphine injection 4 mg    [COMPLETED] ondansetron disintegrating tablet 4 mg    [COMPLETED] promethazine (PHENERGAN) 12.5 mg in dextrose 5 % 50 mL IVPB    [DISCONTINUED] ciprofloxacin (CIPRO)400mg/200ml D5W IVPB 400 mg    [DISCONTINUED] metronidazole IVPB 500 mg    [DISCONTINUED] morphine injection 4 mg     Current Outpatient Medications on File Prior to Encounter   Medication Sig    amoxicillin-clavulanate 875-125mg (AUGMENTIN) 875-125 mg per tablet Take 1 tablet by mouth 2 (two) times daily.    benazepriL (LOTENSIN) 20 MG tablet Take 20 mg by mouth once daily.    bromfenac (PROLENSA) 0.07 % Drop Apply 1 drop to eye once daily. (both eyes)    COMBIGAN 0.2-0.5 % Drop Place 1 drop into both eyes 2 (two) times daily.     difluprednate (DUREZOL) 0.05 % Drop ophthalmic solution Place 1 drop into both eyes 4 (four) times daily.    hydroCHLOROthiazide (HYDRODIURIL) 25 MG tablet Take 25 mg by mouth once daily.    HYDROcodone-acetaminophen (NORCO) 7.5-325 mg per tablet Take 1 tablet by mouth every 6 (six) hours as needed for Pain.    latanoprost 0.005 % ophthalmic solution Place 1 drop into both eyes nightly.    metFORMIN (GLUCOPHAGE) 500 MG tablet Take 500 mg by mouth 2 (two) times daily.    SYNTHROID 75 mcg tablet Take 75 mcg by mouth once daily.       Review of patient's allergies indicates:  No Known Allergies    Past Medical History:   Diagnosis Date    Diverticular disease of large intestine without perforation or abscess     HTN (hypertension)     Thyroid disease     Type 2 diabetes mellitus with unspecified diabetic retinopathy without macular edema     Unspecified glaucoma      Past Surgical History:   Procedure Laterality Date    EXTRACTION OF CATARACT Bilateral     HEMORRHOID SURGERY      HYSTERECTOMY      REDUCTION OF BOTH BREASTS       Family History    None       Tobacco Use    Smoking status: Never Smoker    Smokeless tobacco: Never Used   Substance and Sexual Activity    Alcohol use: Never    Drug use: Never    Sexual activity: Not on file     Review of Systems   Constitutional:  Negative for activity change, chills and fever.   Respiratory:  Negative for shortness of breath.    Cardiovascular:  Negative for chest pain.   Gastrointestinal:  Positive for abdominal pain (LLQ) and diarrhea. Negative for abdominal distention, blood in stool, nausea and vomiting.   Endocrine: Negative for polyuria.   Genitourinary:  Negative for dysuria.   Skin:  Negative for color change.   Hematological:  Does not bruise/bleed easily.   Psychiatric/Behavioral:  Negative for agitation and behavioral problems.    Objective:     Vital Signs (Most Recent):  Temp: 97.3 °F (36.3 °C) (07/06/22 2241)  Pulse: 74 (07/06/22  2241)  Resp: 18 (07/06/22 2241)  BP: (!) 164/77 (07/06/22 2241)  SpO2: 95 % (07/06/22 2241)   Vital Signs (24h Range):  Temp:  [97.3 °F (36.3 °C)-98 °F (36.7 °C)] 97.3 °F (36.3 °C)  Pulse:  [64-80] 74  Resp:  [16-20] 18  SpO2:  [95 %-99 %] 95 %  BP: (137-202)/(74-99) 164/77     Weight: 84.1 kg (185 lb 4.8 oz)  Body mass index is 31.81 kg/m².    Physical Exam  Constitutional:       General: She is not in acute distress.     Appearance: Normal appearance.   HENT:      Mouth/Throat:      Mouth: Mucous membranes are moist.   Eyes:      Extraocular Movements: Extraocular movements intact.   Cardiovascular:      Rate and Rhythm: Normal rate and regular rhythm.   Pulmonary:      Effort: Pulmonary effort is normal. No respiratory distress.      Breath sounds: Normal breath sounds.   Abdominal:      General: There is no distension.      Palpations: Abdomen is soft.      Tenderness: There is abdominal tenderness (LLQ).   Skin:     General: Skin is warm.   Neurological:      Mental Status: She is alert and oriented to person, place, and time. Mental status is at baseline.       Significant Labs:  I have reviewed all pertinent lab results within the past 24 hours.  CBC:   Recent Labs   Lab 07/06/22  1230   WBC 11.35   RBC 4.85   HGB 13.4   HCT 41.7      MCV 86   MCH 27.6   MCHC 32.1     CMP:   Recent Labs   Lab 07/06/22  1230   *   CALCIUM 9.5   ALBUMIN 3.7   PROT 8.1      K 4.4   CO2 26      BUN 9   CREATININE 0.8   ALKPHOS 76   ALT 19   AST 17   BILITOT 0.3     Microbiology Results (last 7 days)       ** No results found for the last 168 hours. **          Recent Labs   Lab 07/06/22  1241   COLORU Yellow   SPECGRAV 1.030   PHUR 5.0   PROTEINUA Trace*   NITRITE Negative   LEUKOCYTESUR 1+*   UROBILINOGEN Negative   HYALINECASTS 1       AbdCT: Small left hepatic cyst.  Cholecystectomy status peer normal bile ducts.  Pancreas, spleen, adrenal glands, kidneys, ureters, and bladder demonstrate no  significant abnormality.     Sigmoid diverticulosis with fat stranding adjacent to multiple proximal sigmoid diverticula.  Disorganized fluid and fat stranding extend to the left superolateral aspect of the vagina.  Remaining bowel demonstrates no obstruction or inflammation.  No free air.  No free fluid throughout the remaining abdomen.  Upper limits normal size the appendix without convincing evidence of acute appendicitis.    Significant Diagnostics:  I have reviewed all pertinent imaging results/findings within the past 24 hours.  I have reviewed and interpreted all pertinent imaging results/findings within the past 24 hours.      Assessment/Plan:     * Diverticulitis large intestine w/o perforation or abscess w/o bleeding  60 y/o female with PMHx of HTN, DM2, hypothyroidism and diverticulitis transferred from [facility] with findings of acute diverticulitis on CT scan from 7/6/2022    -Diet: CLD, monitor toelrance  -CRP now and in AM draw, CBC and CMP  -Pain: tylenol PRN  -Home meds: lisinopril, hydrochlorothiazide, synthroid  -Low dose insulin sliding scale  -DVT prophylaxis: lovenox      VTE Risk Mitigation (From admission, onward)         Ordered     enoxaparin injection 40 mg  Daily         07/06/22 2357     IP VTE HIGH RISK PATIENT  Once         07/06/22 2357                Gigi Landry MD  General Surgery  [facility]    Colorectal fellow addendum:  Agree with assessment and plan as above, briefly this is a 61-year-old female who has had over 1 month of left lower quadrant pain and diarrhea are related to diverticulitis.  Radiographically, there was concern for a potential developing colovaginal fistula, but the patient reports no history of purulent or feculent drainage per vagina.    She has failed multiple outpatient regimens of Cipro/Flagyl and Augmentin if she continues to have significant left lower quadrant pain and diarrhea prompting presentation to the emergency  department.    Initial laboratories remarkable for normal white blood count and mildly elevated CRP of 18 up to 22 this morning.    She has had a colonoscopy within the last year or 2 which she reports had a couple of polyps.  Previous abdominal surgeries include laparoscopic cholecystectomy and a hysterectomy in the 90s through a Pfannenstiel incision    Plan:  -clear liquid diet as tolerated  -Zosyn  -patient may require inpatient intervention should her symptoms persist with conservative/medical management    Sumit Zaman MD  Fellow  Colon & Rectal Surgery

## 2022-07-07 NOTE — PROGRESS NOTES
[facility]  Colorectal Surgery  Progress Note    Patient Name: Marlyn Gary  MRN: [id number]  Admission Date: 7/6/2022  Hospital Length of Stay: 1 days  Attending Physician: Phill Mallory MD    Subjective:     Interval History: [facility] with diverticulitis, no acute events overnite, no n/v, abd pain, sips of cld    Post-Op Info:  * No surgery found *          Medications:  Continuous Infusions:  Scheduled Meds:   enoxaparin  40 mg Subcutaneous Daily    hydroCHLOROthiazide  25 mg Oral Daily    latanoprost  1 drop Both Eyes Nightly    levothyroxine  75 mcg Oral Before breakfast    piperacillin-tazobactam (ZOSYN) IVPB  4.5 g Intravenous Q8H     PRN Meds:   acetaminophen    acetaminophen    dextrose 10%    dextrose 10%    glucagon (human recombinant)    glucose    glucose    insulin aspart U-100    oxyCODONE    oxyCODONE        Objective:     Vital Signs (Most Recent):  Temp: 98.3 °F (36.8 °C) (07/07/22 0751)  Pulse: 80 (07/07/22 0751)  Resp: 20 (07/07/22 0955)  BP: 121/72 (07/07/22 0751)  SpO2: 97 % (07/07/22 0751) Vital Signs (24h Range):  Temp:  [97.3 °F (36.3 °C)-98.3 °F (36.8 °C)] 98.3 °F (36.8 °C)  Pulse:  [64-80] 80  Resp:  [16-20] 20  SpO2:  [95 %-99 %] 97 %  BP: (121-202)/(72-99) 121/72     Intake/Output - Last 3 Shifts         07/05 0700  07/06 0659 07/06 0700  07/07 0659 07/07 0700  07/08 0659    P.O.  250     I.V. (mL/kg)  0 (0)     Total Intake(mL/kg)  250 (3)     Net  +250            Urine Occurrence  3 x     Stool Occurrence  0 x             Physical Exam  Vitals and nursing note reviewed.   Constitutional:       Appearance: She is well-developed.   HENT:      Head: Normocephalic.   Eyes:      Pupils: Pupils are equal, round, and reactive to light.   Cardiovascular:      Rate and Rhythm: Normal rate and regular rhythm.      Heart sounds: Normal heart sounds.   Pulmonary:      Effort: Pulmonary effort is normal. No respiratory distress.      Breath  sounds: Normal breath sounds. No wheezing or rales.   Abdominal:      Palpations: Abdomen is soft. There is no mass.      Tenderness: There is no guarding or rebound.      Comments: Abd pain with light palpation   Skin:     General: Skin is warm and dry.   Neurological:      Mental Status: She is alert and oriented to person, place, and time.   Psychiatric:         Behavior: Behavior normal.         Thought Content: Thought content normal.         Judgment: Judgment normal.           Significant Labs:  BMP (Last 3 Results):   Recent Labs   Lab 07/02/22  1544 07/06/22  1230 07/07/22  0312   GLU 84 129* 100    139 136   K 4.8 4.4 4.1    103 101   CO2 27 26 28   BUN 10 9 7*   CREATININE 0.7 0.8 0.7   CALCIUM 9.8 9.5 9.3     CBC (Last 3 Results):   Recent Labs   Lab 07/02/22  1544 07/06/22  1230 07/07/22  0312   WBC 11.33 11.35 10.78   RBC 5.19 4.85 4.39   HGB 14.3 13.4 12.5   HCT 44.6 41.7 38.3   * 424 377   MCV 86 86 87   MCH 27.6 27.6 28.5   MCHC 32.1 32.1 32.6       Significant Diagnostics:  None    Assessment/Plan:     * Diverticulitis large intestine w/o perforation or abscess w/o bleeding  Accepted as transfer 7/6 with diverticulitis    -cld  -serial abd exams  -monitor labs  -monitor vs  -cont IV zosyn      Diabetes mellitus, type 2  Patient's FSGs are controlled on current medication regimen.  Last A1c reviewed-   Lab Results   Component Value Date    HGBA1C 7.0 (H) 06/23/2022     Most recent fingerstick glucose reviewed-   Recent Labs   Lab 07/07/22  0750   POCTGLUCOSE 102     Current correctional scale  High  Maintain anti-hyperglycemic dose as follows-   Antihyperglycemics (From admission, onward)            Start     Stop Route Frequency Ordered    07/07/22 0106  insulin aspart U-100 pen 0-5 Units         -- SubQ Before meals & nightly PRN 07/07/22 0006        Hold Oral hypoglycemics while patient is in the hospital.    Essential hypertension  Chronic, controlled.  Latest blood pressure  and vitals reviewed-   Temp:  [97.3 °F (36.3 °C)-98.3 °F (36.8 °C)]   Pulse:  [64-80]   Resp:  [16-20]   BP: (121-202)/(72-99)   SpO2:  [95 %-99 %] .   Home meds for hypertension were reviewed and noted below.   Hypertension Medications             benazepriL (LOTENSIN) 20 MG tablet Take 20 mg by mouth once daily.    hydroCHLOROthiazide (HYDRODIURIL) 25 MG tablet Take 25 mg by mouth once daily.          While in the hospital, will manage blood pressure as follows; Continue home antihypertensive regimen    Will utilize p.r.n. blood pressure medication only if patient's blood pressure greater than  180/110 and she develops symptoms such as worsening chest pain or shortness of breath.          Lexi Lam NP  Colorectal Surgery  [facility]

## 2022-07-07 NOTE — PLAN OF CARE
Problem: Adult Inpatient Plan of Care  Goal: Plan of Care Review  Outcome: Ongoing, Progressing  Flowsheets (Taken 7/7/2022 0354)  Plan of Care Reviewed With: patient  Goal: Patient-Specific Goal (Individualized)  Outcome: Ongoing, Progressing  Goal: Absence of Hospital-Acquired Illness or Injury  Outcome: Ongoing, Progressing  Intervention: Identify and Manage Fall Risk  Flowsheets (Taken 7/7/2022 0354)  Safety Promotion/Fall Prevention:   assistive device/personal item within reach   side rails raised x 3   pulse ox  Intervention: Prevent Skin Injury  Flowsheets (Taken 7/7/2022 0354)  Body Position:   30 degrees   log-rolled  Skin Protection: adhesive use limited  Intervention: Prevent and Manage VTE (Venous Thromboembolism) Risk  Flowsheets (Taken 7/7/2022 0354)  Activity Management: Ambulated -L4  VTE Prevention/Management: ambulation promoted  Range of Motion: active ROM (range of motion) encouraged  Intervention: Prevent Infection  Flowsheets (Taken 7/7/2022 0354)  Infection Prevention:   hand hygiene promoted   cohorting utilized   single patient room provided  Goal: Optimal Comfort and Wellbeing  Outcome: Ongoing, Progressing  Intervention: Monitor Pain and Promote Comfort  Flowsheets (Taken 7/7/2022 0354)  Pain Management Interventions:   relaxation techniques promoted   TENS (transcutaneous electric nerve stimulator)   massage provided  Intervention: Provide Person-Centered Care  Flowsheets (Taken 7/7/2022 0354)  Trust Relationship/Rapport:   care explained   questions answered   choices provided   questions encouraged  Goal: Readiness for Transition of Care  Outcome: Ongoing, Progressing     Problem: Diabetes Comorbidity  Goal: Blood Glucose Level Within Targeted Range  Outcome: Ongoing, Progressing  Intervention: Monitor and Manage Glycemia  Flowsheets (Taken 7/7/2022 0354)  Glycemic Management: blood glucose monitored

## 2022-07-07 NOTE — ASSESSMENT & PLAN NOTE
Patient's FSGs are controlled on current medication regimen.  Last A1c reviewed-   Lab Results   Component Value Date    HGBA1C 7.0 (H) 06/23/2022     Most recent fingerstick glucose reviewed-   Recent Labs   Lab 07/07/22  0750   POCTGLUCOSE 102     Current correctional scale  High  Maintain anti-hyperglycemic dose as follows-   Antihyperglycemics (From admission, onward)            Start     Stop Route Frequency Ordered    07/07/22 0106  insulin aspart U-100 pen 0-5 Units         -- SubQ Before meals & nightly PRN 07/07/22 0006        Hold Oral hypoglycemics while patient is in the hospital.

## 2022-07-07 NOTE — HPI
60 y/o female with PMHx of HTN, DM2, hypothyroidism and diverticulitis is transferred from [facility] due to LLQ abdominal pain.  Pain is LLQ colicky in nature and associated with diarrhea. Denies nausea, vomiting, blood in stool, or changes in color of stool. Patient refers pain has been present since Saturday (7/2/22) where she had previously visited ER for the same symptoms and was discharged with instructions to visit gastroenterologist. Patient has appointment with Colon and Rectal Surgeon but could not wait until then. While in the ER patient had CT scan that shows acute diverticulitis findings with inflammatory and free fluid extension to the left superolateral aspect of the vagina.

## 2022-07-07 NOTE — ASSESSMENT & PLAN NOTE
Accepted as transfer 7/6 with diverticulitis    -cld  -serial abd exams  -monitor labs  -monitor vs  -cont IV zosyn

## 2022-07-08 LAB
ALBUMIN SERPL BCP-MCNC: 3.1 G/DL (ref 3.5–5.2)
ALP SERPL-CCNC: 73 U/L (ref 55–135)
ALT SERPL W/O P-5'-P-CCNC: 16 U/L (ref 10–44)
ANION GAP SERPL CALC-SCNC: 9 MMOL/L (ref 8–16)
AST SERPL-CCNC: 14 U/L (ref 10–40)
BASOPHILS # BLD AUTO: 0.06 K/UL (ref 0–0.2)
BASOPHILS NFR BLD: 0.5 % (ref 0–1.9)
BILIRUB SERPL-MCNC: 0.7 MG/DL (ref 0.1–1)
BUN SERPL-MCNC: 8 MG/DL (ref 8–23)
CALCIUM SERPL-MCNC: 9.4 MG/DL (ref 8.7–10.5)
CHLORIDE SERPL-SCNC: 99 MMOL/L (ref 95–110)
CO2 SERPL-SCNC: 27 MMOL/L (ref 23–29)
CREAT SERPL-MCNC: 0.7 MG/DL (ref 0.5–1.4)
CRP SERPL-MCNC: 81.2 MG/L (ref 0–8.2)
DIFFERENTIAL METHOD: ABNORMAL
EOSINOPHIL # BLD AUTO: 0.1 K/UL (ref 0–0.5)
EOSINOPHIL NFR BLD: 0.7 % (ref 0–8)
ERYTHROCYTE [DISTWIDTH] IN BLOOD BY AUTOMATED COUNT: 13.6 % (ref 11.5–14.5)
EST. GFR  (AFRICAN AMERICAN): >60 ML/MIN/1.73 M^2
EST. GFR  (NON AFRICAN AMERICAN): >60 ML/MIN/1.73 M^2
GLUCOSE SERPL-MCNC: 94 MG/DL (ref 70–110)
HCT VFR BLD AUTO: 40.4 % (ref 37–48.5)
HGB BLD-MCNC: 12.8 G/DL (ref 12–16)
IMM GRANULOCYTES # BLD AUTO: 0.04 K/UL (ref 0–0.04)
IMM GRANULOCYTES NFR BLD AUTO: 0.3 % (ref 0–0.5)
LYMPHOCYTES # BLD AUTO: 2.9 K/UL (ref 1–4.8)
LYMPHOCYTES NFR BLD: 24.6 % (ref 18–48)
MCH RBC QN AUTO: 27.9 PG (ref 27–31)
MCHC RBC AUTO-ENTMCNC: 31.7 G/DL (ref 32–36)
MCV RBC AUTO: 88 FL (ref 82–98)
MONOCYTES # BLD AUTO: 1 K/UL (ref 0.3–1)
MONOCYTES NFR BLD: 8.4 % (ref 4–15)
NEUTROPHILS # BLD AUTO: 7.6 K/UL (ref 1.8–7.7)
NEUTROPHILS NFR BLD: 65.5 % (ref 38–73)
NRBC BLD-RTO: 0 /100 WBC
PLATELET # BLD AUTO: 440 K/UL (ref 150–450)
PMV BLD AUTO: 9.6 FL (ref 9.2–12.9)
POCT GLUCOSE: 108 MG/DL (ref 70–110)
POCT GLUCOSE: 95 MG/DL (ref 70–110)
POCT GLUCOSE: 96 MG/DL (ref 70–110)
POTASSIUM SERPL-SCNC: 3.7 MMOL/L (ref 3.5–5.1)
PROT SERPL-MCNC: 7.5 G/DL (ref 6–8.4)
RBC # BLD AUTO: 4.58 M/UL (ref 4–5.4)
SODIUM SERPL-SCNC: 135 MMOL/L (ref 136–145)
WBC # BLD AUTO: 11.66 K/UL (ref 3.9–12.7)

## 2022-07-08 PROCEDURE — 99232 SBSQ HOSP IP/OBS MODERATE 35: CPT | Mod: ,,, | Performed by: COLON & RECTAL SURGERY

## 2022-07-08 PROCEDURE — 63600175 PHARM REV CODE 636 W HCPCS: Performed by: STUDENT IN AN ORGANIZED HEALTH CARE EDUCATION/TRAINING PROGRAM

## 2022-07-08 PROCEDURE — 86140 C-REACTIVE PROTEIN: CPT | Performed by: STUDENT IN AN ORGANIZED HEALTH CARE EDUCATION/TRAINING PROGRAM

## 2022-07-08 PROCEDURE — 80053 COMPREHEN METABOLIC PANEL: CPT | Performed by: STUDENT IN AN ORGANIZED HEALTH CARE EDUCATION/TRAINING PROGRAM

## 2022-07-08 PROCEDURE — 63600175 PHARM REV CODE 636 W HCPCS: Performed by: SURGERY

## 2022-07-08 PROCEDURE — 25000003 PHARM REV CODE 250: Performed by: NURSE PRACTITIONER

## 2022-07-08 PROCEDURE — 25000003 PHARM REV CODE 250: Performed by: SURGERY

## 2022-07-08 PROCEDURE — 36415 COLL VENOUS BLD VENIPUNCTURE: CPT | Performed by: STUDENT IN AN ORGANIZED HEALTH CARE EDUCATION/TRAINING PROGRAM

## 2022-07-08 PROCEDURE — 99232 PR SUBSEQUENT HOSPITAL CARE,LEVL II: ICD-10-PCS | Mod: ,,, | Performed by: COLON & RECTAL SURGERY

## 2022-07-08 PROCEDURE — 85025 COMPLETE CBC W/AUTO DIFF WBC: CPT | Performed by: STUDENT IN AN ORGANIZED HEALTH CARE EDUCATION/TRAINING PROGRAM

## 2022-07-08 PROCEDURE — 25000003 PHARM REV CODE 250: Performed by: STUDENT IN AN ORGANIZED HEALTH CARE EDUCATION/TRAINING PROGRAM

## 2022-07-08 PROCEDURE — 20600001 HC STEP DOWN PRIVATE ROOM

## 2022-07-08 RX ADMIN — OXYCODONE HYDROCHLORIDE 10 MG: 10 TABLET ORAL at 04:07

## 2022-07-08 RX ADMIN — LATANOPROST 1 DROP: 50 SOLUTION OPHTHALMIC at 09:07

## 2022-07-08 RX ADMIN — HYDROCHLOROTHIAZIDE 25 MG: 25 TABLET ORAL at 09:07

## 2022-07-08 RX ADMIN — OXYCODONE 5 MG: 5 TABLET ORAL at 10:07

## 2022-07-08 RX ADMIN — PIPERACILLIN SODIUM AND TAZOBACTAM SODIUM 4.5 G: 4; .5 INJECTION, POWDER, LYOPHILIZED, FOR SOLUTION INTRAVENOUS at 06:07

## 2022-07-08 RX ADMIN — ACETAMINOPHEN 650 MG: 325 TABLET ORAL at 07:07

## 2022-07-08 RX ADMIN — OXYCODONE HYDROCHLORIDE 10 MG: 10 TABLET ORAL at 09:07

## 2022-07-08 RX ADMIN — PIPERACILLIN SODIUM AND TAZOBACTAM SODIUM 4.5 G: 4; .5 INJECTION, POWDER, LYOPHILIZED, FOR SOLUTION INTRAVENOUS at 01:07

## 2022-07-08 RX ADMIN — LEVOTHYROXINE SODIUM 75 MCG: 75 TABLET ORAL at 06:07

## 2022-07-08 RX ADMIN — PIPERACILLIN SODIUM AND TAZOBACTAM SODIUM 4.5 G: 4; .5 INJECTION, POWDER, LYOPHILIZED, FOR SOLUTION INTRAVENOUS at 10:07

## 2022-07-08 RX ADMIN — ENOXAPARIN SODIUM 40 MG: 100 INJECTION SUBCUTANEOUS at 04:07

## 2022-07-08 NOTE — SUBJECTIVE & OBJECTIVE
Subjective:     Interval History: Patient seen and examined at bedside. Feels better overnight. Pain improved. Denies nausea or emesis. Tolerating clears, not hungry.     Post-Op Info:  * No surgery found *          Medications:  Continuous Infusions:  Scheduled Meds:   enoxaparin  40 mg Subcutaneous Daily    hydroCHLOROthiazide  25 mg Oral Daily    latanoprost  1 drop Both Eyes Nightly    levothyroxine  75 mcg Oral Before breakfast    piperacillin-tazobactam (ZOSYN) IVPB  4.5 g Intravenous Q8H     PRN Meds:   acetaminophen    acetaminophen    dextrose 10%    dextrose 10%    glucagon (human recombinant)    glucose    glucose    insulin aspart U-100    oxyCODONE    oxyCODONE        Objective:     Vital Signs (Most Recent):  Temp: 97.3 °F (36.3 °C) (07/08/22 0719)  Pulse: 92 (07/08/22 0719)  Resp: 18 (07/08/22 0719)  BP: 123/68 (07/08/22 0719)  SpO2: (!) 93 % (07/08/22 0719) Vital Signs (24h Range):  Temp:  [97.3 °F (36.3 °C)-99.3 °F (37.4 °C)] 97.3 °F (36.3 °C)  Pulse:  [81-99] 92  Resp:  [15-20] 18  SpO2:  [92 %-94 %] 93 %  BP: (123-156)/(67-89) 123/68     Intake/Output - Last 3 Shifts         07/06 0700  07/07 0659 07/07 0700 07/08 0659 07/08 0700 07/09 0659    P.O. 250      I.V. (mL/kg) 0 (0)      Total Intake(mL/kg) 250 (3)      Net +250             Urine Occurrence 3 x      Stool Occurrence 0 x 0 x             Physical Exam  Vitals and nursing note reviewed.   Constitutional:       Appearance: She is well-developed.   HENT:      Head: Normocephalic.   Eyes:      Pupils: Pupils are equal, round, and reactive to light.   Cardiovascular:      Rate and Rhythm: Normal rate and regular rhythm.      Heart sounds: Normal heart sounds.   Pulmonary:      Effort: Pulmonary effort is normal. No respiratory distress.      Breath sounds: Normal breath sounds. No wheezing or rales.   Abdominal:      Palpations: Abdomen is soft. There is no mass.      Tenderness: There is no guarding or rebound.      Comments: Abd pain with  light palpation   Skin:     General: Skin is warm and dry.   Neurological:      Mental Status: She is alert and oriented to person, place, and time.   Psychiatric:         Behavior: Behavior normal.         Thought Content: Thought content normal.         Judgment: Judgment normal.           Significant Labs:  BMP (Last 3 Results):   Recent Labs   Lab 07/06/22  1230 07/07/22  0312 07/08/22  0434   * 100 94    136 135*   K 4.4 4.1 3.7    101 99   CO2 26 28 27   BUN 9 7* 8   CREATININE 0.8 0.7 0.7   CALCIUM 9.5 9.3 9.4       CBC (Last 3 Results):   Recent Labs   Lab 07/06/22  1230 07/07/22  0312 07/08/22  0434   WBC 11.35 10.78 11.66   RBC 4.85 4.39 4.58   HGB 13.4 12.5 12.8   HCT 41.7 38.3 40.4    377 440   MCV 86 87 88   MCH 27.6 28.5 27.9   MCHC 32.1 32.6 31.7*         Significant Diagnostics:  None

## 2022-07-08 NOTE — PLAN OF CARE
POC is reviewed and understood by patient. Independent to toilet. VSS while on room air. Oriented x4. No sign of distress noted. HOB elevated. Call bell in reach. No c/o pain, nausea. Bed in lowest position. WCTM.

## 2022-07-08 NOTE — PROGRESS NOTES
[facility]  Colorectal Surgery  Progress Note    Patient Name: Marlyn Gary  MRN: [id number]  Admission Date: 7/6/2022  Hospital Length of Stay: 2 days  Attending Physician: Phill Mallory MD    Subjective:     Interval History: Patient seen and examined at bedside. Feels better overnight. Pain improved. Denies nausea or emesis. Tolerating clears, not hungry.     Post-Op Info:  * No surgery found *          Medications:  Continuous Infusions:  Scheduled Meds:   enoxaparin  40 mg Subcutaneous Daily    hydroCHLOROthiazide  25 mg Oral Daily    latanoprost  1 drop Both Eyes Nightly    levothyroxine  75 mcg Oral Before breakfast    piperacillin-tazobactam (ZOSYN) IVPB  4.5 g Intravenous Q8H     PRN Meds:   acetaminophen    acetaminophen    dextrose 10%    dextrose 10%    glucagon (human recombinant)    glucose    glucose    insulin aspart U-100    oxyCODONE    oxyCODONE        Objective:     Vital Signs (Most Recent):  Temp: 97.3 °F (36.3 °C) (07/08/22 0719)  Pulse: 92 (07/08/22 0719)  Resp: 18 (07/08/22 0719)  BP: 123/68 (07/08/22 0719)  SpO2: (!) 93 % (07/08/22 0719) Vital Signs (24h Range):  Temp:  [97.3 °F (36.3 °C)-99.3 °F (37.4 °C)] 97.3 °F (36.3 °C)  Pulse:  [81-99] 92  Resp:  [15-20] 18  SpO2:  [92 %-94 %] 93 %  BP: (123-156)/(67-89) 123/68     Intake/Output - Last 3 Shifts         07/06 0700  07/07 0659 07/07 0700  07/08 0659 07/08 0700 07/09 0659    P.O. 250      I.V. (mL/kg) 0 (0)      Total Intake(mL/kg) 250 (3)      Net +250             Urine Occurrence 3 x      Stool Occurrence 0 x 0 x             Physical Exam  Vitals and nursing note reviewed.   Constitutional:       Appearance: She is well-developed.   HENT:      Head: Normocephalic.   Eyes:      Pupils: Pupils are equal, round, and reactive to light.   Cardiovascular:      Rate and Rhythm: Normal rate and regular rhythm.      Heart sounds: Normal heart sounds.   Pulmonary:      Effort: Pulmonary effort is normal. No  respiratory distress.      Breath sounds: Normal breath sounds. No wheezing or rales.   Abdominal:      Palpations: Abdomen is soft. There is no mass.      Tenderness: There is no guarding or rebound.      Comments: Abd pain with light palpation   Skin:     General: Skin is warm and dry.   Neurological:      Mental Status: She is alert and oriented to person, place, and time.   Psychiatric:         Behavior: Behavior normal.         Thought Content: Thought content normal.         Judgment: Judgment normal.           Significant Labs:  BMP (Last 3 Results):   Recent Labs   Lab 07/06/22  1230 07/07/22  0312 07/08/22  0434   * 100 94    136 135*   K 4.4 4.1 3.7    101 99   CO2 26 28 27   BUN 9 7* 8   CREATININE 0.8 0.7 0.7   CALCIUM 9.5 9.3 9.4       CBC (Last 3 Results):   Recent Labs   Lab 07/06/22  1230 07/07/22  0312 07/08/22  0434   WBC 11.35 10.78 11.66   RBC 4.85 4.39 4.58   HGB 13.4 12.5 12.8   HCT 41.7 38.3 40.4    377 440   MCV 86 87 88   MCH 27.6 28.5 27.9   MCHC 32.1 32.6 31.7*         Significant Diagnostics:  None    Assessment/Plan:     * Diverticulitis large intestine w/o perforation or abscess w/o bleeding  Accepted as transfer 7/6 with diverticulitis    -cld  -serial abd exams  -monitor labs  -monitor vs  -cont IV zosyn      Diabetes mellitus, type 2  Patient's FSGs are controlled on current medication regimen.  Last A1c reviewed-   Lab Results   Component Value Date    HGBA1C 7.0 (H) 06/23/2022     Most recent fingerstick glucose reviewed-   Recent Labs   Lab 07/07/22  0750   POCTGLUCOSE 102     Current correctional scale  High  Maintain anti-hyperglycemic dose as follows-   Antihyperglycemics (From admission, onward)            Start     Stop Route Frequency Ordered    07/07/22 0106  insulin aspart U-100 pen 0-5 Units         -- SubQ Before meals & nightly PRN 07/07/22 0006        Hold Oral hypoglycemics while patient is in the hospital.    Essential hypertension  Chronic,  controlled.  Latest blood pressure and vitals reviewed-   Temp:  [97.3 °F (36.3 °C)-98.3 °F (36.8 °C)]   Pulse:  [64-80]   Resp:  [16-20]   BP: (121-202)/(72-99)   SpO2:  [95 %-99 %] .   Home meds for hypertension were reviewed and noted below.   Hypertension Medications             benazepriL (LOTENSIN) 20 MG tablet Take 20 mg by mouth once daily.    hydroCHLOROthiazide (HYDRODIURIL) 25 MG tablet Take 25 mg by mouth once daily.          While in the hospital, will manage blood pressure as follows; Continue home antihypertensive regimen    Will utilize p.r.n. blood pressure medication only if patient's blood pressure greater than  180/110 and she develops symptoms such as worsening chest pain or shortness of breath.          Stephanie Dawkins MD  Colorectal Surgery  [facility]

## 2022-07-09 LAB
ALBUMIN SERPL BCP-MCNC: 3.1 G/DL (ref 3.5–5.2)
ALP SERPL-CCNC: 78 U/L (ref 55–135)
ALT SERPL W/O P-5'-P-CCNC: 15 U/L (ref 10–44)
ANION GAP SERPL CALC-SCNC: 13 MMOL/L (ref 8–16)
AST SERPL-CCNC: 15 U/L (ref 10–40)
BASOPHILS # BLD AUTO: 0.06 K/UL (ref 0–0.2)
BASOPHILS NFR BLD: 0.5 % (ref 0–1.9)
BILIRUB SERPL-MCNC: 0.9 MG/DL (ref 0.1–1)
BUN SERPL-MCNC: 7 MG/DL (ref 8–23)
CALCIUM SERPL-MCNC: 9.6 MG/DL (ref 8.7–10.5)
CHLORIDE SERPL-SCNC: 96 MMOL/L (ref 95–110)
CO2 SERPL-SCNC: 26 MMOL/L (ref 23–29)
CREAT SERPL-MCNC: 0.7 MG/DL (ref 0.5–1.4)
CRP SERPL-MCNC: 121.3 MG/L (ref 0–8.2)
DIFFERENTIAL METHOD: ABNORMAL
EOSINOPHIL # BLD AUTO: 0.1 K/UL (ref 0–0.5)
EOSINOPHIL NFR BLD: 0.8 % (ref 0–8)
ERYTHROCYTE [DISTWIDTH] IN BLOOD BY AUTOMATED COUNT: 13.7 % (ref 11.5–14.5)
EST. GFR  (AFRICAN AMERICAN): >60 ML/MIN/1.73 M^2
EST. GFR  (NON AFRICAN AMERICAN): >60 ML/MIN/1.73 M^2
GLUCOSE SERPL-MCNC: 83 MG/DL (ref 70–110)
HCT VFR BLD AUTO: 40.5 % (ref 37–48.5)
HGB BLD-MCNC: 13 G/DL (ref 12–16)
IMM GRANULOCYTES # BLD AUTO: 0.04 K/UL (ref 0–0.04)
IMM GRANULOCYTES NFR BLD AUTO: 0.3 % (ref 0–0.5)
LYMPHOCYTES # BLD AUTO: 2.6 K/UL (ref 1–4.8)
LYMPHOCYTES NFR BLD: 22.1 % (ref 18–48)
MCH RBC QN AUTO: 27.5 PG (ref 27–31)
MCHC RBC AUTO-ENTMCNC: 32.1 G/DL (ref 32–36)
MCV RBC AUTO: 86 FL (ref 82–98)
MONOCYTES # BLD AUTO: 1 K/UL (ref 0.3–1)
MONOCYTES NFR BLD: 8.3 % (ref 4–15)
NEUTROPHILS # BLD AUTO: 8 K/UL (ref 1.8–7.7)
NEUTROPHILS NFR BLD: 68 % (ref 38–73)
NRBC BLD-RTO: 0 /100 WBC
PLATELET # BLD AUTO: 386 K/UL (ref 150–450)
PMV BLD AUTO: 9.9 FL (ref 9.2–12.9)
POCT GLUCOSE: 76 MG/DL (ref 70–110)
POCT GLUCOSE: 79 MG/DL (ref 70–110)
POCT GLUCOSE: 93 MG/DL (ref 70–110)
POTASSIUM SERPL-SCNC: 3.7 MMOL/L (ref 3.5–5.1)
PROT SERPL-MCNC: 7.9 G/DL (ref 6–8.4)
RBC # BLD AUTO: 4.72 M/UL (ref 4–5.4)
SODIUM SERPL-SCNC: 135 MMOL/L (ref 136–145)
WBC # BLD AUTO: 11.8 K/UL (ref 3.9–12.7)

## 2022-07-09 PROCEDURE — 20600001 HC STEP DOWN PRIVATE ROOM

## 2022-07-09 PROCEDURE — 25000003 PHARM REV CODE 250

## 2022-07-09 PROCEDURE — 25000003 PHARM REV CODE 250: Performed by: SURGERY

## 2022-07-09 PROCEDURE — 36415 COLL VENOUS BLD VENIPUNCTURE: CPT | Performed by: STUDENT IN AN ORGANIZED HEALTH CARE EDUCATION/TRAINING PROGRAM

## 2022-07-09 PROCEDURE — 80053 COMPREHEN METABOLIC PANEL: CPT | Performed by: STUDENT IN AN ORGANIZED HEALTH CARE EDUCATION/TRAINING PROGRAM

## 2022-07-09 PROCEDURE — 63600175 PHARM REV CODE 636 W HCPCS: Performed by: SURGERY

## 2022-07-09 PROCEDURE — 25000003 PHARM REV CODE 250: Performed by: NURSE PRACTITIONER

## 2022-07-09 PROCEDURE — 63600175 PHARM REV CODE 636 W HCPCS

## 2022-07-09 PROCEDURE — 86140 C-REACTIVE PROTEIN: CPT | Performed by: STUDENT IN AN ORGANIZED HEALTH CARE EDUCATION/TRAINING PROGRAM

## 2022-07-09 PROCEDURE — 25000003 PHARM REV CODE 250: Performed by: STUDENT IN AN ORGANIZED HEALTH CARE EDUCATION/TRAINING PROGRAM

## 2022-07-09 PROCEDURE — 85025 COMPLETE CBC W/AUTO DIFF WBC: CPT | Performed by: STUDENT IN AN ORGANIZED HEALTH CARE EDUCATION/TRAINING PROGRAM

## 2022-07-09 PROCEDURE — 25500020 PHARM REV CODE 255: Performed by: COLON & RECTAL SURGERY

## 2022-07-09 RX ORDER — ONDANSETRON 4 MG/1
4 TABLET, FILM COATED ORAL EVERY 6 HOURS PRN
Status: DISCONTINUED | OUTPATIENT
Start: 2022-07-09 | End: 2022-07-13 | Stop reason: HOSPADM

## 2022-07-09 RX ORDER — ENOXAPARIN SODIUM 100 MG/ML
40 INJECTION SUBCUTANEOUS EVERY 24 HOURS
Status: DISCONTINUED | OUTPATIENT
Start: 2022-07-09 | End: 2022-07-13 | Stop reason: HOSPADM

## 2022-07-09 RX ORDER — SODIUM CHLORIDE 9 MG/ML
INJECTION, SOLUTION INTRAVENOUS CONTINUOUS
Status: DISCONTINUED | OUTPATIENT
Start: 2022-07-09 | End: 2022-07-09

## 2022-07-09 RX ADMIN — LEVOTHYROXINE SODIUM 75 MCG: 75 TABLET ORAL at 06:07

## 2022-07-09 RX ADMIN — PIPERACILLIN SODIUM AND TAZOBACTAM SODIUM 4.5 G: 4; .5 INJECTION, POWDER, LYOPHILIZED, FOR SOLUTION INTRAVENOUS at 02:07

## 2022-07-09 RX ADMIN — OXYCODONE HYDROCHLORIDE 10 MG: 10 TABLET ORAL at 01:07

## 2022-07-09 RX ADMIN — HYDROCHLOROTHIAZIDE 25 MG: 25 TABLET ORAL at 09:07

## 2022-07-09 RX ADMIN — ENOXAPARIN SODIUM 40 MG: 100 INJECTION SUBCUTANEOUS at 04:07

## 2022-07-09 RX ADMIN — PIPERACILLIN SODIUM AND TAZOBACTAM SODIUM 4.5 G: 4; .5 INJECTION, POWDER, LYOPHILIZED, FOR SOLUTION INTRAVENOUS at 10:07

## 2022-07-09 RX ADMIN — PIPERACILLIN SODIUM AND TAZOBACTAM SODIUM 4.5 G: 4; .5 INJECTION, POWDER, LYOPHILIZED, FOR SOLUTION INTRAVENOUS at 06:07

## 2022-07-09 RX ADMIN — ONDANSETRON HYDROCHLORIDE 4 MG: 4 TABLET, FILM COATED ORAL at 04:07

## 2022-07-09 RX ADMIN — IOHEXOL 75 ML: 350 INJECTION, SOLUTION INTRAVENOUS at 11:07

## 2022-07-09 RX ADMIN — LATANOPROST 1 DROP: 50 SOLUTION OPHTHALMIC at 09:07

## 2022-07-09 RX ADMIN — SODIUM CHLORIDE: 0.9 INJECTION, SOLUTION INTRAVENOUS at 03:07

## 2022-07-09 RX ADMIN — ACETAMINOPHEN 650 MG: 325 TABLET ORAL at 06:07

## 2022-07-09 NOTE — SUBJECTIVE & OBJECTIVE
Subjective:     Interval History: NAEON. Patient seen and examined at bedside. Feels better overnight. Pain improved. Denies nausea or emesis. Tolerating clears, not hungry.     Post-Op Info:  * No surgery found *          Medications:  Continuous Infusions:   sodium chloride 0.9%       Scheduled Meds:   hydroCHLOROthiazide  25 mg Oral Daily    latanoprost  1 drop Both Eyes Nightly    levothyroxine  75 mcg Oral Before breakfast    piperacillin-tazobactam (ZOSYN) IVPB  4.5 g Intravenous Q8H     PRN Meds:   acetaminophen    acetaminophen    dextrose 10%    dextrose 10%    glucagon (human recombinant)    glucose    glucose    insulin aspart U-100    oxyCODONE    oxyCODONE        Objective:     Vital Signs (Most Recent):  Temp: 97.9 °F (36.6 °C) (07/09/22 1209)  Pulse: 87 (07/09/22 1209)  Resp: 18 (07/09/22 1304)  BP: 127/81 (07/09/22 1209)  SpO2: 97 % (07/09/22 1209) Vital Signs (24h Range):  Temp:  [97.9 °F (36.6 °C)-99 °F (37.2 °C)] 97.9 °F (36.6 °C)  Pulse:  [87-92] 87  Resp:  [15-18] 18  SpO2:  [93 %-97 %] 97 %  BP: (127-153)/(63-89) 127/81     Intake/Output - Last 3 Shifts         07/07 0700  07/08 0659 07/08 0700  07/09 0659 07/09 0700  07/10 0659    P.O.  240     I.V. (mL/kg)       IV Piggyback  589.5     Total Intake(mL/kg)  829.5 (9.9)     Net  +829.5            Urine Occurrence  3 x 2 x    Stool Occurrence 0 x 1 x             Physical Exam  Vitals and nursing note reviewed.   Constitutional:       Appearance: She is well-developed.   HENT:      Head: Normocephalic.   Eyes:      Pupils: Pupils are equal, round, and reactive to light.   Cardiovascular:      Rate and Rhythm: Normal rate and regular rhythm.      Heart sounds: Normal heart sounds.   Pulmonary:      Effort: Pulmonary effort is normal. No respiratory distress.      Breath sounds: Normal breath sounds. No wheezing or rales.   Abdominal:      Palpations: Abdomen is soft. There is no mass.      Tenderness: There is no guarding or rebound.       Comments: Abd pain with light palpation   Skin:     General: Skin is warm and dry.   Neurological:      Mental Status: She is alert and oriented to person, place, and time.   Psychiatric:         Behavior: Behavior normal.         Thought Content: Thought content normal.         Judgment: Judgment normal.           Significant Labs:  BMP (Last 3 Results):   Recent Labs   Lab 07/07/22 0312 07/08/22  0434 07/09/22  0440    94 83    135* 135*   K 4.1 3.7 3.7    99 96   CO2 28 27 26   BUN 7* 8 7*   CREATININE 0.7 0.7 0.7   CALCIUM 9.3 9.4 9.6     CBC (Last 3 Results):   Recent Labs   Lab 07/07/22 0312 07/08/22  0434 07/09/22  0440   WBC 10.78 11.66 11.80   RBC 4.39 4.58 4.72   HGB 12.5 12.8 13.0   HCT 38.3 40.4 40.5    440 386   MCV 87 88 86   MCH 28.5 27.9 27.5   MCHC 32.6 31.7* 32.1       Significant Diagnostics:  CT SCAN 7/9/2022  Persistent findings of the patient's known history of sigmoid diverticulitis.    -The fluid collection adjacent to the abnormal sigmoid colon now demonstrates rim enhancement and some foci of extraluminal air, suggesting abscess formation, fistulous connection or micro perforation.  Mildly enlarged, likely reactive mesenteric and retroperitoneal nodes are again identified in the left lower quadrant of the abdomen and pelvis.  The inflammatory change adjacent to the sigmoid colon now extends to the superior aspect of the bladder where there is mild focal wall thickening.

## 2022-07-09 NOTE — ASSESSMENT & PLAN NOTE
Accepted as transfer 7/6 with diverticulitis    -cld   -CT scan 7/9/2022 demonstrated intrabdominal fluid collection  -Consult to IR for evaluation of fluid collection  -serial abd exams  -monitor labs  -monitor vs  -cont IV zosyn

## 2022-07-09 NOTE — ASSESSMENT & PLAN NOTE
Accepted as transfer 7/6 with diverticulitis    -CLD  - add boost breeze supplement drink  -CT scan 7/9/2022 demonstrated intrabdominal fluid collection  -IR Consult: Abdominal Collection too small for Percutaneous draining, reconsult if becomes larger  -serial abd exams  -monitor labs  -monitor vs  -cont IV zosyn

## 2022-07-09 NOTE — PLAN OF CARE
POC is reviewed and understood by patient. Independent to toilet. VSS while on room air. Oriented x4. No sign of distress noted. Pain control with just PRN Tylenol. HOB elevated. Call bell in reach. No c/o pain, nausea. Bed in lowest position. WCTM

## 2022-07-09 NOTE — PLAN OF CARE
Pt aox4 on RA, patient verbalizes understanding of POC.    Problem: Adult Inpatient Plan of Care  Goal: Plan of Care Review  Outcome: Ongoing, Progressing  Goal: Patient-Specific Goal (Individualized)  Outcome: Ongoing, Progressing  Goal: Absence of Hospital-Acquired Illness or Injury  Outcome: Ongoing, Progressing  Goal: Optimal Comfort and Wellbeing  Outcome: Ongoing, Progressing  Goal: Readiness for Transition of Care  Outcome: Ongoing, Progressing     Problem: Diabetes Comorbidity  Goal: Blood Glucose Level Within Targeted Range  Outcome: Ongoing, Progressing

## 2022-07-09 NOTE — PROGRESS NOTES
[facility] [place]  Colorectal Surgery  Progress Note    Patient Name: Marlyn Gary  MRN: [id number]  Admission Date: 7/6/2022  Hospital Length of Stay: 3 days  Attending Physician: Phill Mallory MD  No new subjective & objective note has been filed under this hospital service since the last note was generated.    Assessment/Plan:     * Diverticulitis large intestine w/o perforation or abscess w/o bleeding  Accepted as transfer 7/6 with diverticulitis    -NPO  -Hold DVTppx  -Start IV fluids   -CT scan 7/9/2022 demonstrated intrabdominal fluid collection  -Consult to IR for evaluation of fluid collection  -serial abd exams  -monitor labs  -monitor vs  -cont IV zosyn      Diabetes mellitus, type 2  Patient's FSGs are controlled on current medication regimen.  Last A1c reviewed-   Lab Results   Component Value Date    HGBA1C 7.0 (H) 06/23/2022     Most recent fingerstick glucose reviewed-   Recent Labs   Lab 07/07/22  0750   POCTGLUCOSE 102     Current correctional scale  High  Maintain anti-hyperglycemic dose as follows-   Antihyperglycemics (From admission, onward)            Start     Stop Route Frequency Ordered    07/07/22 0106  insulin aspart U-100 pen 0-5 Units         -- SubQ Before meals & nightly PRN 07/07/22 0006        Hold Oral hypoglycemics while patient is in the hospital.    Essential hypertension  Chronic, controlled.  Latest blood pressure and vitals reviewed-   Temp:  [97.3 °F (36.3 °C)-98.3 °F (36.8 °C)]   Pulse:  [64-80]   Resp:  [16-20]   BP: (121-202)/(72-99)   SpO2:  [95 %-99 %] .   Home meds for hypertension were reviewed and noted below.   Hypertension Medications             benazepriL (LOTENSIN) 20 MG tablet Take 20 mg by mouth once daily.    hydroCHLOROthiazide (HYDRODIURIL) 25 MG tablet Take 25 mg by mouth once daily.          While in the hospital, will manage blood pressure as follows; Continue home antihypertensive regimen    Will utilize p.r.n. blood pressure medication only if  patient's blood pressure greater than  180/110 and she develops symptoms such as worsening chest pain or shortness of breath.        Brii Rosario MD  Colorectal Surgery  [facility]

## 2022-07-10 LAB
ALBUMIN SERPL BCP-MCNC: 3.1 G/DL (ref 3.5–5.2)
ALP SERPL-CCNC: 75 U/L (ref 55–135)
ALT SERPL W/O P-5'-P-CCNC: 14 U/L (ref 10–44)
ANION GAP SERPL CALC-SCNC: 12 MMOL/L (ref 8–16)
AST SERPL-CCNC: 15 U/L (ref 10–40)
BASOPHILS # BLD AUTO: 0.06 K/UL (ref 0–0.2)
BASOPHILS NFR BLD: 0.5 % (ref 0–1.9)
BILIRUB SERPL-MCNC: 0.8 MG/DL (ref 0.1–1)
BUN SERPL-MCNC: 8 MG/DL (ref 8–23)
C TRACH DNA SPEC QL NAA+PROBE: NOT DETECTED
CALCIUM SERPL-MCNC: 9.9 MG/DL (ref 8.7–10.5)
CHLORIDE SERPL-SCNC: 95 MMOL/L (ref 95–110)
CO2 SERPL-SCNC: 27 MMOL/L (ref 23–29)
CREAT SERPL-MCNC: 0.7 MG/DL (ref 0.5–1.4)
CRP SERPL-MCNC: 146.1 MG/L (ref 0–8.2)
DIFFERENTIAL METHOD: ABNORMAL
EOSINOPHIL # BLD AUTO: 0.1 K/UL (ref 0–0.5)
EOSINOPHIL NFR BLD: 0.6 % (ref 0–8)
ERYTHROCYTE [DISTWIDTH] IN BLOOD BY AUTOMATED COUNT: 13.4 % (ref 11.5–14.5)
EST. GFR  (AFRICAN AMERICAN): >60 ML/MIN/1.73 M^2
EST. GFR  (NON AFRICAN AMERICAN): >60 ML/MIN/1.73 M^2
GLUCOSE SERPL-MCNC: 75 MG/DL (ref 70–110)
HCT VFR BLD AUTO: 39.9 % (ref 37–48.5)
HGB BLD-MCNC: 13 G/DL (ref 12–16)
IMM GRANULOCYTES # BLD AUTO: 0.03 K/UL (ref 0–0.04)
IMM GRANULOCYTES NFR BLD AUTO: 0.2 % (ref 0–0.5)
LYMPHOCYTES # BLD AUTO: 2.9 K/UL (ref 1–4.8)
LYMPHOCYTES NFR BLD: 23.1 % (ref 18–48)
MCH RBC QN AUTO: 28.3 PG (ref 27–31)
MCHC RBC AUTO-ENTMCNC: 32.6 G/DL (ref 32–36)
MCV RBC AUTO: 87 FL (ref 82–98)
MONOCYTES # BLD AUTO: 1 K/UL (ref 0.3–1)
MONOCYTES NFR BLD: 8 % (ref 4–15)
N GONORRHOEA DNA SPEC QL NAA+PROBE: NOT DETECTED
NEUTROPHILS # BLD AUTO: 8.5 K/UL (ref 1.8–7.7)
NEUTROPHILS NFR BLD: 67.6 % (ref 38–73)
NRBC BLD-RTO: 0 /100 WBC
PLATELET # BLD AUTO: 454 K/UL (ref 150–450)
PMV BLD AUTO: 9.3 FL (ref 9.2–12.9)
POCT GLUCOSE: 103 MG/DL (ref 70–110)
POCT GLUCOSE: 105 MG/DL (ref 70–110)
POCT GLUCOSE: 75 MG/DL (ref 70–110)
POCT GLUCOSE: 90 MG/DL (ref 70–110)
POCT GLUCOSE: 97 MG/DL (ref 70–110)
POTASSIUM SERPL-SCNC: 3.6 MMOL/L (ref 3.5–5.1)
PROT SERPL-MCNC: 8.1 G/DL (ref 6–8.4)
RBC # BLD AUTO: 4.59 M/UL (ref 4–5.4)
SODIUM SERPL-SCNC: 134 MMOL/L (ref 136–145)
WBC # BLD AUTO: 12.57 K/UL (ref 3.9–12.7)

## 2022-07-10 PROCEDURE — 85025 COMPLETE CBC W/AUTO DIFF WBC: CPT | Performed by: STUDENT IN AN ORGANIZED HEALTH CARE EDUCATION/TRAINING PROGRAM

## 2022-07-10 PROCEDURE — 25000003 PHARM REV CODE 250: Performed by: NURSE PRACTITIONER

## 2022-07-10 PROCEDURE — 86140 C-REACTIVE PROTEIN: CPT | Performed by: STUDENT IN AN ORGANIZED HEALTH CARE EDUCATION/TRAINING PROGRAM

## 2022-07-10 PROCEDURE — 25000003 PHARM REV CODE 250: Performed by: STUDENT IN AN ORGANIZED HEALTH CARE EDUCATION/TRAINING PROGRAM

## 2022-07-10 PROCEDURE — 36415 COLL VENOUS BLD VENIPUNCTURE: CPT | Performed by: STUDENT IN AN ORGANIZED HEALTH CARE EDUCATION/TRAINING PROGRAM

## 2022-07-10 PROCEDURE — 63600175 PHARM REV CODE 636 W HCPCS: Performed by: SURGERY

## 2022-07-10 PROCEDURE — 25000003 PHARM REV CODE 250: Performed by: SURGERY

## 2022-07-10 PROCEDURE — 94760 N-INVAS EAR/PLS OXIMETRY 1: CPT

## 2022-07-10 PROCEDURE — 63600175 PHARM REV CODE 636 W HCPCS

## 2022-07-10 PROCEDURE — 80053 COMPREHEN METABOLIC PANEL: CPT | Performed by: STUDENT IN AN ORGANIZED HEALTH CARE EDUCATION/TRAINING PROGRAM

## 2022-07-10 PROCEDURE — 20600001 HC STEP DOWN PRIVATE ROOM

## 2022-07-10 RX ADMIN — LEVOTHYROXINE SODIUM 75 MCG: 75 TABLET ORAL at 07:07

## 2022-07-10 RX ADMIN — LATANOPROST 1 DROP: 50 SOLUTION OPHTHALMIC at 09:07

## 2022-07-10 RX ADMIN — PIPERACILLIN SODIUM AND TAZOBACTAM SODIUM 4.5 G: 4; .5 INJECTION, POWDER, LYOPHILIZED, FOR SOLUTION INTRAVENOUS at 02:07

## 2022-07-10 RX ADMIN — HYDROCHLOROTHIAZIDE 25 MG: 25 TABLET ORAL at 09:07

## 2022-07-10 RX ADMIN — OXYCODONE HYDROCHLORIDE 10 MG: 10 TABLET ORAL at 11:07

## 2022-07-10 RX ADMIN — OXYCODONE 5 MG: 5 TABLET ORAL at 02:07

## 2022-07-10 RX ADMIN — OXYCODONE HYDROCHLORIDE 10 MG: 10 TABLET ORAL at 04:07

## 2022-07-10 RX ADMIN — ACETAMINOPHEN 650 MG: 325 TABLET ORAL at 10:07

## 2022-07-10 RX ADMIN — PIPERACILLIN SODIUM AND TAZOBACTAM SODIUM 4.5 G: 4; .5 INJECTION, POWDER, LYOPHILIZED, FOR SOLUTION INTRAVENOUS at 11:07

## 2022-07-10 RX ADMIN — OXYCODONE HYDROCHLORIDE 10 MG: 10 TABLET ORAL at 08:07

## 2022-07-10 RX ADMIN — PIPERACILLIN SODIUM AND TAZOBACTAM SODIUM 4.5 G: 4; .5 INJECTION, POWDER, LYOPHILIZED, FOR SOLUTION INTRAVENOUS at 07:07

## 2022-07-10 RX ADMIN — ENOXAPARIN SODIUM 40 MG: 100 INJECTION SUBCUTANEOUS at 05:07

## 2022-07-10 RX ADMIN — ACETAMINOPHEN 650 MG: 325 TABLET ORAL at 08:07

## 2022-07-10 NOTE — PROGRESS NOTES
[facility]  Colorectal Surgery  Progress Note    Patient Name: Marlyn Gary  MRN: [id number]  Admission Date: 7/6/2022  Hospital Length of Stay: 4 days  Attending Physician: Phill Mallory MD    Subjective:     Interval History: NAEON. Patient examined at bedside. Refers abdominal  discomfort, Denies nausea or emesis. Tolerating clears, voiding and bowel movements  spontaneous.      Post-Op Info:  * No surgery found *          Medications:  Continuous Infusions:      Scheduled Meds:   enoxaparin  40 mg Subcutaneous Daily    hydroCHLOROthiazide  25 mg Oral Daily    latanoprost  1 drop Both Eyes Nightly    levothyroxine  75 mcg Oral Before breakfast    piperacillin-tazobactam (ZOSYN) IVPB  4.5 g Intravenous Q8H     PRN Meds:   acetaminophen    acetaminophen    dextrose 10%    dextrose 10%    glucagon (human recombinant)    glucose    glucose    insulin aspart U-100    ondansetron    oxyCODONE    oxyCODONE        Objective:     Vital Signs (Most Recent):  Temp: 98.2 °F (36.8 °C) (07/10/22 0851)  Pulse: 92 (07/10/22 0851)  Resp: 18 (07/10/22 0851)  BP: (!) 146/79 (07/10/22 0851)  SpO2: (!) 92 % (07/10/22 0851) Vital Signs (24h Range):  Temp:  [97.9 °F (36.6 °C)-99 °F (37.2 °C)] 98.2 °F (36.8 °C)  Pulse:  [87-95] 92  Resp:  [16-18] 18  SpO2:  [91 %-97 %] 92 %  BP: (127-156)/(68-82) 146/79     Intake/Output - Last 3 Shifts         07/08 0700  07/09 0659 07/09 0700  07/10 0659 07/10 0700  07/11 0659    P.O. 240 120     I.V. (mL/kg)  85.8 (1)     IV Piggyback 589.5      Total Intake(mL/kg) 829.5 (9.9) 205.8 (2.4)     Net +829.5 +205.8            Urine Occurrence 3 x 8 x 2 x    Stool Occurrence 1 x 0 x             Physical Exam  Vitals and nursing note reviewed.   Constitutional:       Appearance: She is well-developed.   HENT:      Head: Normocephalic.   Eyes:      Pupils: Pupils are equal, round, and reactive to light.   Cardiovascular:      Rate and Rhythm: Normal rate and regular rhythm.       Heart sounds: Normal heart sounds.   Pulmonary:      Effort: Pulmonary effort is normal. No respiratory distress.      Breath sounds: Normal breath sounds. No wheezing or rales.   Abdominal:      Palpations: Abdomen is soft. There is no mass.      Tenderness: There is no guarding or rebound.      Comments: Abd pain with light palpation   Skin:     General: Skin is warm and dry.   Neurological:      Mental Status: She is alert and oriented to person, place, and time.   Psychiatric:         Behavior: Behavior normal.         Thought Content: Thought content normal.         Judgment: Judgment normal.           Significant Labs:  BMP (Last 3 Results):   Recent Labs   Lab 07/08/22  0434 07/09/22 0440 07/10/22  0422   GLU 94 83 75   * 135* 134*   K 3.7 3.7 3.6   CL 99 96 95   CO2 27 26 27   BUN 8 7* 8   CREATININE 0.7 0.7 0.7   CALCIUM 9.4 9.6 9.9       CBC (Last 3 Results):   Recent Labs   Lab 07/08/22 0434 07/09/22 0440 07/10/22  0422   WBC 11.66 11.80 12.57   RBC 4.58 4.72 4.59   HGB 12.8 13.0 13.0   HCT 40.4 40.5 39.9    386 454*   MCV 88 86 87   MCH 27.9 27.5 28.3   MCHC 31.7* 32.1 32.6         Significant Diagnostics:  CT SCAN 7/9/2022  Persistent findings of the patient's known history of sigmoid diverticulitis.    -The fluid collection adjacent to the abnormal sigmoid colon now demonstrates rim enhancement and some foci of extraluminal air, suggesting abscess formation, fistulous connection or micro perforation.  Mildly enlarged, likely reactive mesenteric and retroperitoneal nodes are again identified in the left lower quadrant of the abdomen and pelvis.  The inflammatory change adjacent to the sigmoid colon now extends to the superior aspect of the bladder where there is mild focal wall thickening.      Assessment/Plan:     * Diverticulitis large intestine w/o perforation or abscess w/o bleeding  Accepted as transfer 7/6 with diverticulitis    -CLD  - add boost breeze supplement drink  -CT scan  7/9/2022 demonstrated intrabdominal fluid collection  -IR Consult: Abdominal Collection too small for Percutaneous draining, reconsult if becomes larger  -serial abd exams  -monitor labs  -monitor vs  -cont IV zosyn      Diabetes mellitus, type 2  Patient's FSGs are controlled on current medication regimen.  Last A1c reviewed-   Lab Results   Component Value Date    HGBA1C 7.0 (H) 06/23/2022     Most recent fingerstick glucose reviewed-   Recent Labs   Lab 07/07/22  0750   POCTGLUCOSE 102     Current correctional scale  High  Maintain anti-hyperglycemic dose as follows-   Antihyperglycemics (From admission, onward)            Start     Stop Route Frequency Ordered    07/07/22 0106  insulin aspart U-100 pen 0-5 Units         -- SubQ Before meals & nightly PRN 07/07/22 0006        Hold Oral hypoglycemics while patient is in the hospital.    Essential hypertension  Chronic, controlled.  Latest blood pressure and vitals reviewed-   Temp:  [97.3 °F (36.3 °C)-98.3 °F (36.8 °C)]   Pulse:  [64-80]   Resp:  [16-20]   BP: (121-202)/(72-99)   SpO2:  [95 %-99 %] .   Home meds for hypertension were reviewed and noted below.   Hypertension Medications             benazepriL (LOTENSIN) 20 MG tablet Take 20 mg by mouth once daily.    hydroCHLOROthiazide (HYDRODIURIL) 25 MG tablet Take 25 mg by mouth once daily.          While in the hospital, will manage blood pressure as follows; Continue home antihypertensive regimen    Will utilize p.r.n. blood pressure medication only if patient's blood pressure greater than  180/110 and she develops symptoms such as worsening chest pain or shortness of breath.          Brii Rosario MD  Colorectal Surgery  [facility]  [facility]

## 2022-07-10 NOTE — SUBJECTIVE & OBJECTIVE
Subjective:     Interval History: NAEON. Patient examined at bedside. Refers abdominal  discomfort, Denies nausea or emesis. Tolerating clears, voiding and bowel movements  spontaneous.      Post-Op Info:  * No surgery found *          Medications:  Continuous Infusions:      Scheduled Meds:   enoxaparin  40 mg Subcutaneous Daily    hydroCHLOROthiazide  25 mg Oral Daily    latanoprost  1 drop Both Eyes Nightly    levothyroxine  75 mcg Oral Before breakfast    piperacillin-tazobactam (ZOSYN) IVPB  4.5 g Intravenous Q8H     PRN Meds:   acetaminophen    acetaminophen    dextrose 10%    dextrose 10%    glucagon (human recombinant)    glucose    glucose    insulin aspart U-100    ondansetron    oxyCODONE    oxyCODONE        Objective:     Vital Signs (Most Recent):  Temp: 98.2 °F (36.8 °C) (07/10/22 0851)  Pulse: 92 (07/10/22 0851)  Resp: 18 (07/10/22 0851)  BP: (!) 146/79 (07/10/22 0851)  SpO2: (!) 92 % (07/10/22 0851) Vital Signs (24h Range):  Temp:  [97.9 °F (36.6 °C)-99 °F (37.2 °C)] 98.2 °F (36.8 °C)  Pulse:  [87-95] 92  Resp:  [16-18] 18  SpO2:  [91 %-97 %] 92 %  BP: (127-156)/(68-82) 146/79     Intake/Output - Last 3 Shifts         07/08 0700 07/09 0659 07/09 0700  07/10 0659 07/10 0700 07/11 0659    P.O. 240 120     I.V. (mL/kg)  85.8 (1)     IV Piggyback 589.5      Total Intake(mL/kg) 829.5 (9.9) 205.8 (2.4)     Net +829.5 +205.8            Urine Occurrence 3 x 8 x 2 x    Stool Occurrence 1 x 0 x             Physical Exam  Vitals and nursing note reviewed.   Constitutional:       Appearance: She is well-developed.   HENT:      Head: Normocephalic.   Eyes:      Pupils: Pupils are equal, round, and reactive to light.   Cardiovascular:      Rate and Rhythm: Normal rate and regular rhythm.      Heart sounds: Normal heart sounds.   Pulmonary:      Effort: Pulmonary effort is normal. No respiratory distress.      Breath sounds: Normal breath sounds. No wheezing or rales.   Abdominal:      Palpations: Abdomen is  soft. There is no mass.      Tenderness: There is no guarding or rebound.      Comments: Abd pain with light palpation   Skin:     General: Skin is warm and dry.   Neurological:      Mental Status: She is alert and oriented to person, place, and time.   Psychiatric:         Behavior: Behavior normal.         Thought Content: Thought content normal.         Judgment: Judgment normal.           Significant Labs:  BMP (Last 3 Results):   Recent Labs   Lab 07/08/22  0434 07/09/22  0440 07/10/22  0422   GLU 94 83 75   * 135* 134*   K 3.7 3.7 3.6   CL 99 96 95   CO2 27 26 27   BUN 8 7* 8   CREATININE 0.7 0.7 0.7   CALCIUM 9.4 9.6 9.9       CBC (Last 3 Results):   Recent Labs   Lab 07/08/22  0434 07/09/22  0440 07/10/22  0422   WBC 11.66 11.80 12.57   RBC 4.58 4.72 4.59   HGB 12.8 13.0 13.0   HCT 40.4 40.5 39.9    386 454*   MCV 88 86 87   MCH 27.9 27.5 28.3   MCHC 31.7* 32.1 32.6         Significant Diagnostics:  CT SCAN 7/9/2022  Persistent findings of the patient's known history of sigmoid diverticulitis.    -The fluid collection adjacent to the abnormal sigmoid colon now demonstrates rim enhancement and some foci of extraluminal air, suggesting abscess formation, fistulous connection or micro perforation.  Mildly enlarged, likely reactive mesenteric and retroperitoneal nodes are again identified in the left lower quadrant of the abdomen and pelvis.  The inflammatory change adjacent to the sigmoid colon now extends to the superior aspect of the bladder where there is mild focal wall thickening.

## 2022-07-10 NOTE — PLAN OF CARE
POC is reviewed and understood by patient. Independent to toilet. VSS while on room air. Oriented x4. No sign of distress noted. Pt received one dose of Oxy 10mg for pain. HOB elevated. Call bell in reach. No c/o pain, nausea. Bed in lowest position. WCTM

## 2022-07-11 LAB
ALBUMIN SERPL BCP-MCNC: 3.2 G/DL (ref 3.5–5.2)
ALP SERPL-CCNC: 83 U/L (ref 55–135)
ALT SERPL W/O P-5'-P-CCNC: 15 U/L (ref 10–44)
ANION GAP SERPL CALC-SCNC: 14 MMOL/L (ref 8–16)
AST SERPL-CCNC: 18 U/L (ref 10–40)
BASOPHILS # BLD AUTO: 0.07 K/UL (ref 0–0.2)
BASOPHILS NFR BLD: 0.5 % (ref 0–1.9)
BILIRUB SERPL-MCNC: 0.8 MG/DL (ref 0.1–1)
BUN SERPL-MCNC: 8 MG/DL (ref 8–23)
CALCIUM SERPL-MCNC: 9.1 MG/DL (ref 8.7–10.5)
CHLORIDE SERPL-SCNC: 95 MMOL/L (ref 95–110)
CO2 SERPL-SCNC: 25 MMOL/L (ref 23–29)
CREAT SERPL-MCNC: 0.7 MG/DL (ref 0.5–1.4)
CRP SERPL-MCNC: 140.2 MG/L (ref 0–8.2)
DIFFERENTIAL METHOD: ABNORMAL
EOSINOPHIL # BLD AUTO: 0.1 K/UL (ref 0–0.5)
EOSINOPHIL NFR BLD: 0.7 % (ref 0–8)
ERYTHROCYTE [DISTWIDTH] IN BLOOD BY AUTOMATED COUNT: 13.1 % (ref 11.5–14.5)
EST. GFR  (AFRICAN AMERICAN): >60 ML/MIN/1.73 M^2
EST. GFR  (NON AFRICAN AMERICAN): >60 ML/MIN/1.73 M^2
GLUCOSE SERPL-MCNC: 73 MG/DL (ref 70–110)
HCT VFR BLD AUTO: 41.1 % (ref 37–48.5)
HGB BLD-MCNC: 13.3 G/DL (ref 12–16)
IMM GRANULOCYTES # BLD AUTO: 0.04 K/UL (ref 0–0.04)
IMM GRANULOCYTES NFR BLD AUTO: 0.3 % (ref 0–0.5)
LYMPHOCYTES # BLD AUTO: 4.1 K/UL (ref 1–4.8)
LYMPHOCYTES NFR BLD: 28.5 % (ref 18–48)
MCH RBC QN AUTO: 27.8 PG (ref 27–31)
MCHC RBC AUTO-ENTMCNC: 32.4 G/DL (ref 32–36)
MCV RBC AUTO: 86 FL (ref 82–98)
MONOCYTES # BLD AUTO: 1.2 K/UL (ref 0.3–1)
MONOCYTES NFR BLD: 8.2 % (ref 4–15)
NEUTROPHILS # BLD AUTO: 8.9 K/UL (ref 1.8–7.7)
NEUTROPHILS NFR BLD: 61.8 % (ref 38–73)
NRBC BLD-RTO: 0 /100 WBC
PLATELET # BLD AUTO: 526 K/UL (ref 150–450)
PMV BLD AUTO: 9.4 FL (ref 9.2–12.9)
POCT GLUCOSE: 79 MG/DL (ref 70–110)
POCT GLUCOSE: 79 MG/DL (ref 70–110)
POCT GLUCOSE: 89 MG/DL (ref 70–110)
POCT GLUCOSE: 96 MG/DL (ref 70–110)
POTASSIUM SERPL-SCNC: 4 MMOL/L (ref 3.5–5.1)
PROT SERPL-MCNC: 7.9 G/DL (ref 6–8.4)
RBC # BLD AUTO: 4.78 M/UL (ref 4–5.4)
SODIUM SERPL-SCNC: 134 MMOL/L (ref 136–145)
WBC # BLD AUTO: 14.41 K/UL (ref 3.9–12.7)

## 2022-07-11 PROCEDURE — 80053 COMPREHEN METABOLIC PANEL: CPT | Performed by: STUDENT IN AN ORGANIZED HEALTH CARE EDUCATION/TRAINING PROGRAM

## 2022-07-11 PROCEDURE — 25000003 PHARM REV CODE 250: Performed by: STUDENT IN AN ORGANIZED HEALTH CARE EDUCATION/TRAINING PROGRAM

## 2022-07-11 PROCEDURE — 85025 COMPLETE CBC W/AUTO DIFF WBC: CPT | Performed by: STUDENT IN AN ORGANIZED HEALTH CARE EDUCATION/TRAINING PROGRAM

## 2022-07-11 PROCEDURE — 36415 COLL VENOUS BLD VENIPUNCTURE: CPT | Performed by: STUDENT IN AN ORGANIZED HEALTH CARE EDUCATION/TRAINING PROGRAM

## 2022-07-11 PROCEDURE — 25000003 PHARM REV CODE 250: Performed by: SURGERY

## 2022-07-11 PROCEDURE — 99232 SBSQ HOSP IP/OBS MODERATE 35: CPT | Mod: ,,, | Performed by: NURSE PRACTITIONER

## 2022-07-11 PROCEDURE — 99232 PR SUBSEQUENT HOSPITAL CARE,LEVL II: ICD-10-PCS | Mod: ,,, | Performed by: NURSE PRACTITIONER

## 2022-07-11 PROCEDURE — 63600175 PHARM REV CODE 636 W HCPCS: Performed by: SURGERY

## 2022-07-11 PROCEDURE — 25000003 PHARM REV CODE 250: Performed by: NURSE PRACTITIONER

## 2022-07-11 PROCEDURE — 86140 C-REACTIVE PROTEIN: CPT | Performed by: STUDENT IN AN ORGANIZED HEALTH CARE EDUCATION/TRAINING PROGRAM

## 2022-07-11 PROCEDURE — 20600001 HC STEP DOWN PRIVATE ROOM

## 2022-07-11 PROCEDURE — 63600175 PHARM REV CODE 636 W HCPCS

## 2022-07-11 PROCEDURE — 25000003 PHARM REV CODE 250

## 2022-07-11 RX ADMIN — ONDANSETRON HYDROCHLORIDE 4 MG: 4 TABLET, FILM COATED ORAL at 04:07

## 2022-07-11 RX ADMIN — ENOXAPARIN SODIUM 40 MG: 100 INJECTION SUBCUTANEOUS at 04:07

## 2022-07-11 RX ADMIN — LEVOTHYROXINE SODIUM 75 MCG: 75 TABLET ORAL at 05:07

## 2022-07-11 RX ADMIN — OXYCODONE HYDROCHLORIDE 10 MG: 10 TABLET ORAL at 09:07

## 2022-07-11 RX ADMIN — HYDROCHLOROTHIAZIDE 25 MG: 25 TABLET ORAL at 09:07

## 2022-07-11 RX ADMIN — LATANOPROST 1 DROP: 50 SOLUTION OPHTHALMIC at 09:07

## 2022-07-11 RX ADMIN — PIPERACILLIN SODIUM AND TAZOBACTAM SODIUM 4.5 G: 4; .5 INJECTION, POWDER, LYOPHILIZED, FOR SOLUTION INTRAVENOUS at 05:07

## 2022-07-11 RX ADMIN — PIPERACILLIN SODIUM AND TAZOBACTAM SODIUM 4.5 G: 4; .5 INJECTION, POWDER, LYOPHILIZED, FOR SOLUTION INTRAVENOUS at 10:07

## 2022-07-11 RX ADMIN — PIPERACILLIN SODIUM AND TAZOBACTAM SODIUM 4.5 G: 4; .5 INJECTION, POWDER, LYOPHILIZED, FOR SOLUTION INTRAVENOUS at 02:07

## 2022-07-11 NOTE — PLAN OF CARE
[facility] - [facility]  Discharge Reassessment    Primary Care Provider: RAY Rick    Expected Discharge Date: 7/14/2022    Reassessment (most recent)     Discharge Reassessment - 07/11/22 1456        Discharge Reassessment    Assessment Type Discharge Planning Reassessment     Did the patient's condition or plan change since previous assessment? Yes     Discharge Plan discussed with: Patient     Communicated RIGOBERTO with patient/caregiver Yes     Discharge Plan A Home with family     Discharge Plan B Home     DME Needed Upon Discharge  other (see comments)   Unknown at this time    Discharge Barriers Identified None     Why the patient remains in the hospital Requires continued medical care        Post-Acute Status    Discharge Delays None known at this time               Pt not ready for discharge due to on-going abdominal pain/ observation.   SW will remain available for families in [facility].  Currently pt has d/c plans in progress at this time.        Sherlyn Srinivasan LCSW  Case Management/[facility]  [phone]

## 2022-07-11 NOTE — ASSESSMENT & PLAN NOTE
Chronic, controlled.  Latest blood pressure and vitals reviewed-   Temp:  [97.9 °F (36.6 °C)-98.8 °F (37.1 °C)]   Pulse:  [87-99]   Resp:  [16-18]   BP: (130-158)/(72-93)   SpO2:  [92 %-93 %] .   Home meds for hypertension were reviewed and noted below.   Hypertension Medications             benazepriL (LOTENSIN) 20 MG tablet Take 20 mg by mouth once daily.    hydroCHLOROthiazide (HYDRODIURIL) 25 MG tablet Take 25 mg by mouth once daily.          While in the hospital, will manage blood pressure as follows; Continue home antihypertensive regimen    Will utilize p.r.n. blood pressure medication only if patient's blood pressure greater than  180/110 and she develops symptoms such as worsening chest pain or shortness of breath.

## 2022-07-11 NOTE — SUBJECTIVE & OBJECTIVE
Subjective:     Interval History: no acute events overnite, till with significant abd pain, keep on cld, no n/v, pos for fltus    Post-Op Info:  * No surgery found *          Medications:  Continuous Infusions:  Scheduled Meds:   enoxaparin  40 mg Subcutaneous Daily    hydroCHLOROthiazide  25 mg Oral Daily    latanoprost  1 drop Both Eyes Nightly    levothyroxine  75 mcg Oral Before breakfast    piperacillin-tazobactam (ZOSYN) IVPB  4.5 g Intravenous Q8H     PRN Meds:   acetaminophen    acetaminophen    dextrose 10%    dextrose 10%    glucagon (human recombinant)    glucose    glucose    insulin aspart U-100    ondansetron    oxyCODONE    oxyCODONE        Objective:     Vital Signs (Most Recent):  Temp: 98.1 °F (36.7 °C) (07/11/22 0939)  Pulse: 87 (07/11/22 0939)  Resp: 18 (07/11/22 0945)  BP: (!) 147/84 (07/11/22 0939)  SpO2: (!) 93 % (07/11/22 0939)   Vital Signs (24h Range):  Temp:  [97.9 °F (36.6 °C)-98.8 °F (37.1 °C)] 98.1 °F (36.7 °C)  Pulse:  [87-99] 87  Resp:  [16-18] 18  SpO2:  [92 %-93 %] 93 %  BP: (130-158)/(72-93) 147/84     Intake/Output - Last 3 Shifts         07/09 0700  07/10 0659 07/10 0700  07/11 0659 07/11 0700 07/12 0659    P.O. 120 395     I.V. (mL/kg) 85.8 (1)      IV Piggyback  395     Total Intake(mL/kg) 205.8 (2.4) 790 (9.4)     Urine (mL/kg/hr)  0 (0)     Total Output  0     Net +205.8 +790            Urine Occurrence 8 x 7 x     Stool Occurrence 0 x 0 x             Physical Exam  Vitals and nursing note reviewed.   Constitutional:       Appearance: She is well-developed.   HENT:      Head: Normocephalic.   Eyes:      Pupils: Pupils are equal, round, and reactive to light.   Cardiovascular:      Rate and Rhythm: Normal rate and regular rhythm.      Heart sounds: Normal heart sounds.   Pulmonary:      Effort: Pulmonary effort is normal. No respiratory distress.      Breath sounds: Normal breath sounds. No wheezing or rales.   Abdominal:      Palpations: Abdomen is soft. There is no mass.       Tenderness: There is no guarding or rebound.      Comments: Abd tenderness with palpation   Genitourinary:     Comments: Purulent vaginal discharge  Skin:     General: Skin is warm and dry.   Neurological:      Mental Status: She is alert and oriented to person, place, and time.   Psychiatric:         Behavior: Behavior normal.         Thought Content: Thought content normal.         Judgment: Judgment normal.           Significant Labs:  BMP (Last 3 Results):   Recent Labs   Lab 07/09/22  0440 07/10/22  0422 07/11/22  0356   GLU 83 75 73   * 134* 134*   K 3.7 3.6 4.0   CL 96 95 95   CO2 26 27 25   BUN 7* 8 8   CREATININE 0.7 0.7 0.7   CALCIUM 9.6 9.9 9.1     CBC (Last 3 Results):   Recent Labs   Lab 07/09/22  0440 07/10/22  0422 07/11/22  0356   WBC 11.80 12.57 14.41*   RBC 4.72 4.59 4.78   HGB 13.0 13.0 13.3   HCT 40.5 39.9 41.1    454* 526*   MCV 86 87 86   MCH 27.5 28.3 27.8   MCHC 32.1 32.6 32.4       Significant Diagnostics:  None

## 2022-07-11 NOTE — PLAN OF CARE
POC is reviewed and understood by patient. Independent to toilet. VSS while on room air. Oriented x4. No sign of distress noted. Pt has had several c/o pain to lower abd, so frequent pain meds given q3h PRN for her pain control. HOB elevated. Call bell in reach. No c/o pain, nausea. Bed in lowest position. WCTM.

## 2022-07-11 NOTE — ASSESSMENT & PLAN NOTE
Patient's FSGs are controlled on current medication regimen.  Last A1c reviewed-   Lab Results   Component Value Date    HGBA1C 7.0 (H) 06/23/2022     Most recent fingerstick glucose reviewed-   Recent Labs   Lab 07/10/22  1248 07/10/22  1604 07/10/22  2118 07/11/22  0935   POCTGLUCOSE 105 75 90 96     Current correctional scale  High  Maintain anti-hyperglycemic dose as follows-   Antihyperglycemics (From admission, onward)            Start     Stop Route Frequency Ordered    07/07/22 0106  insulin aspart U-100 pen 0-5 Units         -- SubQ Before meals & nightly PRN 07/07/22 0006        Hold Oral hypoglycemics while patient is in the hospital.

## 2022-07-11 NOTE — PROGRESS NOTES
[facility]  Colorectal Surgery  Progress Note    Patient Name: Marlyn Gary  MRN: [id number]  Admission Date: 7/6/2022  Hospital Length of Stay: 5 days  Attending Physician: Phill Mallory MD    Subjective:     Interval History: no acute events overnite, till with significant abd pain, keep on cld, no n/v, pos for fltus    Post-Op Info:  * No surgery found *          Medications:  Continuous Infusions:  Scheduled Meds:   enoxaparin  40 mg Subcutaneous Daily    hydroCHLOROthiazide  25 mg Oral Daily    latanoprost  1 drop Both Eyes Nightly    levothyroxine  75 mcg Oral Before breakfast    piperacillin-tazobactam (ZOSYN) IVPB  4.5 g Intravenous Q8H     PRN Meds:   acetaminophen    acetaminophen    dextrose 10%    dextrose 10%    glucagon (human recombinant)    glucose    glucose    insulin aspart U-100    ondansetron    oxyCODONE    oxyCODONE        Objective:     Vital Signs (Most Recent):  Temp: 98.1 °F (36.7 °C) (07/11/22 0939)  Pulse: 87 (07/11/22 0939)  Resp: 18 (07/11/22 0945)  BP: (!) 147/84 (07/11/22 0939)  SpO2: (!) 93 % (07/11/22 0939)   Vital Signs (24h Range):  Temp:  [97.9 °F (36.6 °C)-98.8 °F (37.1 °C)] 98.1 °F (36.7 °C)  Pulse:  [87-99] 87  Resp:  [16-18] 18  SpO2:  [92 %-93 %] 93 %  BP: (130-158)/(72-93) 147/84     Intake/Output - Last 3 Shifts         07/09 0700  07/10 0659 07/10 0700 07/11 0659 07/11 0700 07/12 0659    P.O. 120 395     I.V. (mL/kg) 85.8 (1)      IV Piggyback  395     Total Intake(mL/kg) 205.8 (2.4) 790 (9.4)     Urine (mL/kg/hr)  0 (0)     Total Output  0     Net +205.8 +790            Urine Occurrence 8 x 7 x     Stool Occurrence 0 x 0 x             Physical Exam  Vitals and nursing note reviewed.   Constitutional:       Appearance: She is well-developed.   HENT:      Head: Normocephalic.   Eyes:      Pupils: Pupils are equal, round, and reactive to light.   Cardiovascular:      Rate and Rhythm: Normal rate and regular rhythm.      Heart sounds: Normal  heart sounds.   Pulmonary:      Effort: Pulmonary effort is normal. No respiratory distress.      Breath sounds: Normal breath sounds. No wheezing or rales.   Abdominal:      Palpations: Abdomen is soft. There is no mass.      Tenderness: There is no guarding or rebound.      Comments: Abd tenderness with palpation   Genitourinary:     Comments: Purulent vaginal discharge  Skin:     General: Skin is warm and dry.   Neurological:      Mental Status: She is alert and oriented to person, place, and time.   Psychiatric:         Behavior: Behavior normal.         Thought Content: Thought content normal.         Judgment: Judgment normal.           Significant Labs:  BMP (Last 3 Results):   Recent Labs   Lab 07/09/22  0440 07/10/22  0422 07/11/22  0356   GLU 83 75 73   * 134* 134*   K 3.7 3.6 4.0   CL 96 95 95   CO2 26 27 25   BUN 7* 8 8   CREATININE 0.7 0.7 0.7   CALCIUM 9.6 9.9 9.1     CBC (Last 3 Results):   Recent Labs   Lab 07/09/22  0440 07/10/22  0422 07/11/22  0356   WBC 11.80 12.57 14.41*   RBC 4.72 4.59 4.78   HGB 13.0 13.0 13.3   HCT 40.5 39.9 41.1    454* 526*   MCV 86 87 86   MCH 27.5 28.3 27.8   MCHC 32.1 32.6 32.4       Significant Diagnostics:  None    Assessment/Plan:     * Diverticulitis large intestine w/o perforation or abscess w/o bleeding  Accepted as transfer 7/6 with diverticulitis    -CLD  - add boost breeze supplement drink  -CT scan 7/9/2022 demonstrated intrabdominal fluid collection  -IR Consult: Abdominal Collection too small for Percutaneous draining, reconsult if becomes larger  -serial abd exams  -monitor labs  -monitor vs  -cont IV zosyn      Diabetes mellitus, type 2  Patient's FSGs are controlled on current medication regimen.  Last A1c reviewed-   Lab Results   Component Value Date    HGBA1C 7.0 (H) 06/23/2022     Most recent fingerstick glucose reviewed-   Recent Labs   Lab 07/10/22  1248 07/10/22  1604 07/10/22  2118 07/11/22  0935   POCTGLUCOSE 105 75 90 96     Current  correctional scale  High  Maintain anti-hyperglycemic dose as follows-   Antihyperglycemics (From admission, onward)            Start     Stop Route Frequency Ordered    07/07/22 0106  insulin aspart U-100 pen 0-5 Units         -- SubQ Before meals & nightly PRN 07/07/22 0006        Hold Oral hypoglycemics while patient is in the hospital.    Essential hypertension  Chronic, controlled.  Latest blood pressure and vitals reviewed-   Temp:  [97.9 °F (36.6 °C)-98.8 °F (37.1 °C)]   Pulse:  [87-99]   Resp:  [16-18]   BP: (130-158)/(72-93)   SpO2:  [92 %-93 %] .   Home meds for hypertension were reviewed and noted below.   Hypertension Medications             benazepriL (LOTENSIN) 20 MG tablet Take 20 mg by mouth once daily.    hydroCHLOROthiazide (HYDRODIURIL) 25 MG tablet Take 25 mg by mouth once daily.          While in the hospital, will manage blood pressure as follows; Continue home antihypertensive regimen    Will utilize p.r.n. blood pressure medication only if patient's blood pressure greater than  180/110 and she develops symptoms such as worsening chest pain or shortness of breath.          Lexi Lam NP  Colorectal Surgery  [facility]

## 2022-07-12 LAB
ALBUMIN SERPL BCP-MCNC: 3 G/DL (ref 3.5–5.2)
ALP SERPL-CCNC: 70 U/L (ref 55–135)
ALT SERPL W/O P-5'-P-CCNC: 20 U/L (ref 10–44)
ANION GAP SERPL CALC-SCNC: 12 MMOL/L (ref 8–16)
AST SERPL-CCNC: 28 U/L (ref 10–40)
BASOPHILS # BLD AUTO: 0.06 K/UL (ref 0–0.2)
BASOPHILS NFR BLD: 0.6 % (ref 0–1.9)
BILIRUB SERPL-MCNC: 0.5 MG/DL (ref 0.1–1)
BUN SERPL-MCNC: 8 MG/DL (ref 8–23)
CALCIUM SERPL-MCNC: 9.5 MG/DL (ref 8.7–10.5)
CHLORIDE SERPL-SCNC: 94 MMOL/L (ref 95–110)
CO2 SERPL-SCNC: 29 MMOL/L (ref 23–29)
CREAT SERPL-MCNC: 0.7 MG/DL (ref 0.5–1.4)
CRP SERPL-MCNC: 107.9 MG/L (ref 0–8.2)
DIFFERENTIAL METHOD: ABNORMAL
EOSINOPHIL # BLD AUTO: 0.1 K/UL (ref 0–0.5)
EOSINOPHIL NFR BLD: 1 % (ref 0–8)
ERYTHROCYTE [DISTWIDTH] IN BLOOD BY AUTOMATED COUNT: 13.3 % (ref 11.5–14.5)
EST. GFR  (AFRICAN AMERICAN): >60 ML/MIN/1.73 M^2
EST. GFR  (NON AFRICAN AMERICAN): >60 ML/MIN/1.73 M^2
GLUCOSE SERPL-MCNC: 81 MG/DL (ref 70–110)
HCT VFR BLD AUTO: 37.9 % (ref 37–48.5)
HGB BLD-MCNC: 12.4 G/DL (ref 12–16)
IMM GRANULOCYTES # BLD AUTO: 0.04 K/UL (ref 0–0.04)
IMM GRANULOCYTES NFR BLD AUTO: 0.4 % (ref 0–0.5)
LYMPHOCYTES # BLD AUTO: 3.4 K/UL (ref 1–4.8)
LYMPHOCYTES NFR BLD: 31.1 % (ref 18–48)
MCH RBC QN AUTO: 28.1 PG (ref 27–31)
MCHC RBC AUTO-ENTMCNC: 32.7 G/DL (ref 32–36)
MCV RBC AUTO: 86 FL (ref 82–98)
MONOCYTES # BLD AUTO: 0.9 K/UL (ref 0.3–1)
MONOCYTES NFR BLD: 8.7 % (ref 4–15)
NEUTROPHILS # BLD AUTO: 6.3 K/UL (ref 1.8–7.7)
NEUTROPHILS NFR BLD: 58.2 % (ref 38–73)
NRBC BLD-RTO: 0 /100 WBC
PLATELET # BLD AUTO: 469 K/UL (ref 150–450)
PMV BLD AUTO: 9 FL (ref 9.2–12.9)
POCT GLUCOSE: 103 MG/DL (ref 70–110)
POCT GLUCOSE: 71 MG/DL (ref 70–110)
POCT GLUCOSE: 74 MG/DL (ref 70–110)
POTASSIUM SERPL-SCNC: 3.8 MMOL/L (ref 3.5–5.1)
PROT SERPL-MCNC: 7.9 G/DL (ref 6–8.4)
RBC # BLD AUTO: 4.42 M/UL (ref 4–5.4)
SODIUM SERPL-SCNC: 135 MMOL/L (ref 136–145)
WBC # BLD AUTO: 10.78 K/UL (ref 3.9–12.7)

## 2022-07-12 PROCEDURE — 63600175 PHARM REV CODE 636 W HCPCS

## 2022-07-12 PROCEDURE — 36415 COLL VENOUS BLD VENIPUNCTURE: CPT | Performed by: STUDENT IN AN ORGANIZED HEALTH CARE EDUCATION/TRAINING PROGRAM

## 2022-07-12 PROCEDURE — 99213 OFFICE O/P EST LOW 20 MIN: CPT | Mod: ,,, | Performed by: NURSE PRACTITIONER

## 2022-07-12 PROCEDURE — 85025 COMPLETE CBC W/AUTO DIFF WBC: CPT | Performed by: STUDENT IN AN ORGANIZED HEALTH CARE EDUCATION/TRAINING PROGRAM

## 2022-07-12 PROCEDURE — 20600001 HC STEP DOWN PRIVATE ROOM

## 2022-07-12 PROCEDURE — 94761 N-INVAS EAR/PLS OXIMETRY MLT: CPT

## 2022-07-12 PROCEDURE — 63600175 PHARM REV CODE 636 W HCPCS: Performed by: SURGERY

## 2022-07-12 PROCEDURE — 99213 PR OFFICE/OUTPT VISIT, EST, LEVL III, 20-29 MIN: ICD-10-PCS | Mod: ,,, | Performed by: NURSE PRACTITIONER

## 2022-07-12 PROCEDURE — 25000003 PHARM REV CODE 250: Performed by: STUDENT IN AN ORGANIZED HEALTH CARE EDUCATION/TRAINING PROGRAM

## 2022-07-12 PROCEDURE — 80053 COMPREHEN METABOLIC PANEL: CPT | Performed by: STUDENT IN AN ORGANIZED HEALTH CARE EDUCATION/TRAINING PROGRAM

## 2022-07-12 PROCEDURE — 25000003 PHARM REV CODE 250: Performed by: SURGERY

## 2022-07-12 PROCEDURE — 86140 C-REACTIVE PROTEIN: CPT | Performed by: STUDENT IN AN ORGANIZED HEALTH CARE EDUCATION/TRAINING PROGRAM

## 2022-07-12 RX ADMIN — PIPERACILLIN SODIUM AND TAZOBACTAM SODIUM 4.5 G: 4; .5 INJECTION, POWDER, LYOPHILIZED, FOR SOLUTION INTRAVENOUS at 06:07

## 2022-07-12 RX ADMIN — ACETAMINOPHEN 650 MG: 325 TABLET ORAL at 02:07

## 2022-07-12 RX ADMIN — PIPERACILLIN SODIUM AND TAZOBACTAM SODIUM 4.5 G: 4; .5 INJECTION, POWDER, LYOPHILIZED, FOR SOLUTION INTRAVENOUS at 02:07

## 2022-07-12 RX ADMIN — HYDROCHLOROTHIAZIDE 25 MG: 25 TABLET ORAL at 09:07

## 2022-07-12 RX ADMIN — LEVOTHYROXINE SODIUM 75 MCG: 75 TABLET ORAL at 05:07

## 2022-07-12 RX ADMIN — ENOXAPARIN SODIUM 40 MG: 100 INJECTION SUBCUTANEOUS at 05:07

## 2022-07-12 RX ADMIN — LATANOPROST 1 DROP: 50 SOLUTION OPHTHALMIC at 09:07

## 2022-07-12 NOTE — ASSESSMENT & PLAN NOTE
Patient's FSGs are controlled on current medication regimen.  Last A1c reviewed-   Lab Results   Component Value Date    HGBA1C 7.0 (H) 06/23/2022     Most recent fingerstick glucose reviewed-   Recent Labs   Lab 07/11/22  1313 07/11/22  1632 07/11/22  2202 07/12/22  0731   POCTGLUCOSE 89 79 79 74     Current correctional scale  High  Maintain anti-hyperglycemic dose as follows-   Antihyperglycemics (From admission, onward)            Start     Stop Route Frequency Ordered    07/07/22 0106  insulin aspart U-100 pen 0-5 Units         -- SubQ Before meals & nightly PRN 07/07/22 0006        Hold Oral hypoglycemics while patient is in the hospital.

## 2022-07-12 NOTE — PROGRESS NOTES
[facility]  Colorectal Surgery  Progress Note    Patient Name: Marlyn Gary  MRN: [id number]  Admission Date: 7/6/2022  Hospital Length of Stay: 6 days  Attending Physician: Phill Mallory MD    Subjective:     Interval History: no acute events overnite, feeling better, pos fo bm with flatus, abd pain improved    Post-Op Info:  * No surgery found *          Medications:  Continuous Infusions:  Scheduled Meds:   enoxaparin  40 mg Subcutaneous Daily    hydroCHLOROthiazide  25 mg Oral Daily    latanoprost  1 drop Both Eyes Nightly    levothyroxine  75 mcg Oral Before breakfast    piperacillin-tazobactam (ZOSYN) IVPB  4.5 g Intravenous Q8H     PRN Meds:   acetaminophen    acetaminophen    dextrose 10%    dextrose 10%    glucagon (human recombinant)    glucose    glucose    insulin aspart U-100    ondansetron    oxyCODONE    oxyCODONE        Objective:     Vital Signs (Most Recent):  Temp: 96.7 °F (35.9 °C) (07/12/22 0730)  Pulse: 78 (07/12/22 0730)  Resp: 16 (07/12/22 0730)  BP: 134/78 (07/12/22 0730)  SpO2: 97 % (07/12/22 0730) Vital Signs (24h Range):  Temp:  [96.7 °F (35.9 °C)-98.3 °F (36.8 °C)] 96.7 °F (35.9 °C)  Pulse:  [78-92] 78  Resp:  [14-18] 16  SpO2:  [92 %-97 %] 97 %  BP: (132-149)/(69-78) 134/78     Intake/Output - Last 3 Shifts         07/10 0700 07/11 0659 07/11 0700 07/12 0659 07/12 0700 07/13 0659    P.O. 395 360     I.V. (mL/kg)       IV Piggyback 395 299.8     Total Intake(mL/kg) 790 (9.4) 659.8 (7.8)     Urine (mL/kg/hr) 0 (0)      Total Output 0      Net +790 +659.8            Urine Occurrence 7 x 2 x 1 x    Stool Occurrence 0 x 1 x 1 x            Physical Exam  Vitals and nursing note reviewed.   Constitutional:       Appearance: She is well-developed.   HENT:      Head: Normocephalic.   Eyes:      Pupils: Pupils are equal, round, and reactive to light.   Cardiovascular:      Rate and Rhythm: Normal rate and regular rhythm.      Heart sounds: Normal heart sounds.    Pulmonary:      Effort: Pulmonary effort is normal. No respiratory distress.      Breath sounds: Normal breath sounds. No wheezing or rales.   Abdominal:      Palpations: Abdomen is soft. There is no mass.      Tenderness: There is no guarding or rebound.      Comments: Abd less tender   Genitourinary:     Comments: Purulent vaginal discharge (poss abscess draining out thru fistula)  Skin:     General: Skin is warm and dry.   Neurological:      Mental Status: She is alert and oriented to person, place, and time.   Psychiatric:         Behavior: Behavior normal.         Thought Content: Thought content normal.         Judgment: Judgment normal.           Significant Labs:  BMP (Last 3 Results):   Recent Labs   Lab 07/10/22  0422 07/11/22  0356 07/12/22 0427   GLU 75 73 81   * 134* 135*   K 3.6 4.0 3.8   CL 95 95 94*   CO2 27 25 29   BUN 8 8 8   CREATININE 0.7 0.7 0.7   CALCIUM 9.9 9.1 9.5     CBC (Last 3 Results):   Recent Labs   Lab 07/10/22  0422 07/11/22  0356 07/12/22 0427   WBC 12.57 14.41* 10.78   RBC 4.59 4.78 4.42   HGB 13.0 13.3 12.4   HCT 39.9 41.1 37.9   * 526* 469*   MCV 87 86 86   MCH 28.3 27.8 28.1   MCHC 32.6 32.4 32.7       Significant Diagnostics:  None    Assessment/Plan:     * Diverticulitis large intestine w/o perforation or abscess w/o bleeding  Accepted as transfer 7/6 with diverticulitis    -reg diet  - add boost breeze supplement drink  -CT scan 7/9/2022 demonstrated intrabdominal fluid collection  -IR Consult: Abdominal Collection too small for Percutaneous draining, reconsult if becomes larger  -serial abd exams  -monitor labs  -monitor vs  -cont IV zosyn      Diabetes mellitus, type 2  Patient's FSGs are controlled on current medication regimen.  Last A1c reviewed-   Lab Results   Component Value Date    HGBA1C 7.0 (H) 06/23/2022     Most recent fingerstick glucose reviewed-   Recent Labs   Lab 07/11/22  1313 07/11/22  1632 07/11/22  2202 07/12/22  0731   POCTGLUCOSE 89 79  79 74     Current correctional scale  High  Maintain anti-hyperglycemic dose as follows-   Antihyperglycemics (From admission, onward)            Start     Stop Route Frequency Ordered    07/07/22 0106  insulin aspart U-100 pen 0-5 Units         -- SubQ Before meals & nightly PRN 07/07/22 0006        Hold Oral hypoglycemics while patient is in the hospital.    Essential hypertension  Chronic, controlled.  Latest blood pressure and vitals reviewed-   Temp:  [96.7 °F (35.9 °C)-98.3 °F (36.8 °C)]   Pulse:  [78-92]   Resp:  [14-18]   BP: (132-149)/(69-78)   SpO2:  [92 %-97 %] .   Home meds for hypertension were reviewed and noted below.   Hypertension Medications             benazepriL (LOTENSIN) 20 MG tablet Take 20 mg by mouth once daily.    hydroCHLOROthiazide (HYDRODIURIL) 25 MG tablet Take 25 mg by mouth once daily.          While in the hospital, will manage blood pressure as follows; Continue home antihypertensive regimen    Will utilize p.r.n. blood pressure medication only if patient's blood pressure greater than  180/110 and she develops symptoms such as worsening chest pain or shortness of breath.          Lexi Lam NP  Colorectal Surgery  [facility]

## 2022-07-12 NOTE — PLAN OF CARE
POC reviewed with pt, verbalized understanding. VSS on RA. AAOX4. Remains free of falls and injury. Ambulated hallways, independent.     - tolerating regular diet, denies nausea   - complaints of yellow vaginal discharge, however discharge also itchy - milind henry np already aware   - bm x 2, small    IVF. Pain controlled. ACCU ACHS.  Patient denies chest pain & SOB. . No acute events or distress noted. Bed in lowest position, call light in reach, frequent rounds made for safety.     WCTM.      Problem: Adult Inpatient Plan of Care  Goal: Plan of Care Review  Outcome: Ongoing, Progressing  Goal: Patient-Specific Goal (Individualized)  Outcome: Ongoing, Progressing  Goal: Absence of Hospital-Acquired Illness or Injury  Outcome: Ongoing, Progressing  Goal: Optimal Comfort and Wellbeing  Outcome: Ongoing, Progressing     Problem: Diabetes Comorbidity  Goal: Blood Glucose Level Within Targeted Range  Outcome: Ongoing, Progressing

## 2022-07-12 NOTE — ASSESSMENT & PLAN NOTE
Chronic, controlled.  Latest blood pressure and vitals reviewed-   Temp:  [96.7 °F (35.9 °C)-98.3 °F (36.8 °C)]   Pulse:  [78-92]   Resp:  [14-18]   BP: (132-149)/(69-78)   SpO2:  [92 %-97 %] .   Home meds for hypertension were reviewed and noted below.   Hypertension Medications             benazepriL (LOTENSIN) 20 MG tablet Take 20 mg by mouth once daily.    hydroCHLOROthiazide (HYDRODIURIL) 25 MG tablet Take 25 mg by mouth once daily.          While in the hospital, will manage blood pressure as follows; Continue home antihypertensive regimen    Will utilize p.r.n. blood pressure medication only if patient's blood pressure greater than  180/110 and she develops symptoms such as worsening chest pain or shortness of breath.

## 2022-07-12 NOTE — ASSESSMENT & PLAN NOTE
Accepted as transfer 7/6 with diverticulitis    -reg diet  - add boost breeze supplement drink  -CT scan 7/9/2022 demonstrated intrabdominal fluid collection  -IR Consult: Abdominal Collection too small for Percutaneous draining, reconsult if becomes larger  -serial abd exams  -monitor labs  -monitor vs  -cont IV zosyn

## 2022-07-12 NOTE — SUBJECTIVE & OBJECTIVE
Subjective:     Interval History: no acute events overnite, feeling better, pos fo bm with flatus, abd pain improved    Post-Op Info:  * No surgery found *          Medications:  Continuous Infusions:  Scheduled Meds:   enoxaparin  40 mg Subcutaneous Daily    hydroCHLOROthiazide  25 mg Oral Daily    latanoprost  1 drop Both Eyes Nightly    levothyroxine  75 mcg Oral Before breakfast    piperacillin-tazobactam (ZOSYN) IVPB  4.5 g Intravenous Q8H     PRN Meds:   acetaminophen    acetaminophen    dextrose 10%    dextrose 10%    glucagon (human recombinant)    glucose    glucose    insulin aspart U-100    ondansetron    oxyCODONE    oxyCODONE        Objective:     Vital Signs (Most Recent):  Temp: 96.7 °F (35.9 °C) (07/12/22 0730)  Pulse: 78 (07/12/22 0730)  Resp: 16 (07/12/22 0730)  BP: 134/78 (07/12/22 0730)  SpO2: 97 % (07/12/22 0730) Vital Signs (24h Range):  Temp:  [96.7 °F (35.9 °C)-98.3 °F (36.8 °C)] 96.7 °F (35.9 °C)  Pulse:  [78-92] 78  Resp:  [14-18] 16  SpO2:  [92 %-97 %] 97 %  BP: (132-149)/(69-78) 134/78     Intake/Output - Last 3 Shifts         07/10 0700  07/11 0659 07/11 0700  07/12 0659 07/12 0700  07/13 0659    P.O. 395 360     I.V. (mL/kg)       IV Piggyback 395 299.8     Total Intake(mL/kg) 790 (9.4) 659.8 (7.8)     Urine (mL/kg/hr) 0 (0)      Total Output 0      Net +790 +659.8            Urine Occurrence 7 x 2 x 1 x    Stool Occurrence 0 x 1 x 1 x            Physical Exam  Vitals and nursing note reviewed.   Constitutional:       Appearance: She is well-developed.   HENT:      Head: Normocephalic.   Eyes:      Pupils: Pupils are equal, round, and reactive to light.   Cardiovascular:      Rate and Rhythm: Normal rate and regular rhythm.      Heart sounds: Normal heart sounds.   Pulmonary:      Effort: Pulmonary effort is normal. No respiratory distress.      Breath sounds: Normal breath sounds. No wheezing or rales.   Abdominal:      Palpations: Abdomen is soft. There is no mass.      Tenderness:  There is no guarding or rebound.      Comments: Abd less tender   Genitourinary:     Comments: Purulent vaginal discharge (poss abscess draining out thru fistula)  Skin:     General: Skin is warm and dry.   Neurological:      Mental Status: She is alert and oriented to person, place, and time.   Psychiatric:         Behavior: Behavior normal.         Thought Content: Thought content normal.         Judgment: Judgment normal.           Significant Labs:  BMP (Last 3 Results):   Recent Labs   Lab 07/10/22  0422 07/11/22  0356 07/12/22  0427   GLU 75 73 81   * 134* 135*   K 3.6 4.0 3.8   CL 95 95 94*   CO2 27 25 29   BUN 8 8 8   CREATININE 0.7 0.7 0.7   CALCIUM 9.9 9.1 9.5     CBC (Last 3 Results):   Recent Labs   Lab 07/10/22  0422 07/11/22  0356 07/12/22  0427   WBC 12.57 14.41* 10.78   RBC 4.59 4.78 4.42   HGB 13.0 13.3 12.4   HCT 39.9 41.1 37.9   * 526* 469*   MCV 87 86 86   MCH 28.3 27.8 28.1   MCHC 32.6 32.4 32.7       Significant Diagnostics:  None

## 2022-07-13 ENCOUNTER — TELEPHONE (OUTPATIENT)
Dept: SURGERY | Facility: CLINIC | Age: 62
End: 2022-07-13
Payer: COMMERCIAL

## 2022-07-13 VITALS
OXYGEN SATURATION: 95 % | SYSTOLIC BLOOD PRESSURE: 123 MMHG | RESPIRATION RATE: 18 BRPM | HEIGHT: 64 IN | HEART RATE: 82 BPM | DIASTOLIC BLOOD PRESSURE: 81 MMHG | WEIGHT: 185.31 LBS | BODY MASS INDEX: 31.64 KG/M2 | TEMPERATURE: 98 F

## 2022-07-13 LAB
ALBUMIN SERPL BCP-MCNC: 2.9 G/DL (ref 3.5–5.2)
ALP SERPL-CCNC: 70 U/L (ref 55–135)
ALT SERPL W/O P-5'-P-CCNC: 27 U/L (ref 10–44)
ANION GAP SERPL CALC-SCNC: 12 MMOL/L (ref 8–16)
AST SERPL-CCNC: 32 U/L (ref 10–40)
BASOPHILS # BLD AUTO: 0.06 K/UL (ref 0–0.2)
BASOPHILS NFR BLD: 0.6 % (ref 0–1.9)
BILIRUB SERPL-MCNC: 0.4 MG/DL (ref 0.1–1)
BUN SERPL-MCNC: 8 MG/DL (ref 8–23)
CALCIUM SERPL-MCNC: 9 MG/DL (ref 8.7–10.5)
CHLORIDE SERPL-SCNC: 97 MMOL/L (ref 95–110)
CO2 SERPL-SCNC: 29 MMOL/L (ref 23–29)
CREAT SERPL-MCNC: 0.7 MG/DL (ref 0.5–1.4)
CRP SERPL-MCNC: 64.3 MG/L (ref 0–8.2)
DIFFERENTIAL METHOD: ABNORMAL
EOSINOPHIL # BLD AUTO: 0.1 K/UL (ref 0–0.5)
EOSINOPHIL NFR BLD: 1.1 % (ref 0–8)
ERYTHROCYTE [DISTWIDTH] IN BLOOD BY AUTOMATED COUNT: 13.3 % (ref 11.5–14.5)
EST. GFR  (AFRICAN AMERICAN): >60 ML/MIN/1.73 M^2
EST. GFR  (NON AFRICAN AMERICAN): >60 ML/MIN/1.73 M^2
GLUCOSE SERPL-MCNC: 101 MG/DL (ref 70–110)
HCT VFR BLD AUTO: 37.7 % (ref 37–48.5)
HGB BLD-MCNC: 12.4 G/DL (ref 12–16)
IMM GRANULOCYTES # BLD AUTO: 0.03 K/UL (ref 0–0.04)
IMM GRANULOCYTES NFR BLD AUTO: 0.3 % (ref 0–0.5)
LYMPHOCYTES # BLD AUTO: 3.3 K/UL (ref 1–4.8)
LYMPHOCYTES NFR BLD: 33.6 % (ref 18–48)
MCH RBC QN AUTO: 28.1 PG (ref 27–31)
MCHC RBC AUTO-ENTMCNC: 32.9 G/DL (ref 32–36)
MCV RBC AUTO: 85 FL (ref 82–98)
MONOCYTES # BLD AUTO: 0.9 K/UL (ref 0.3–1)
MONOCYTES NFR BLD: 8.7 % (ref 4–15)
NEUTROPHILS # BLD AUTO: 5.5 K/UL (ref 1.8–7.7)
NEUTROPHILS NFR BLD: 55.7 % (ref 38–73)
NRBC BLD-RTO: 0 /100 WBC
PLATELET # BLD AUTO: 484 K/UL (ref 150–450)
PMV BLD AUTO: 9.1 FL (ref 9.2–12.9)
POTASSIUM SERPL-SCNC: 3.2 MMOL/L (ref 3.5–5.1)
PROT SERPL-MCNC: 7.9 G/DL (ref 6–8.4)
RBC # BLD AUTO: 4.42 M/UL (ref 4–5.4)
SODIUM SERPL-SCNC: 138 MMOL/L (ref 136–145)
WBC # BLD AUTO: 9.9 K/UL (ref 3.9–12.7)

## 2022-07-13 PROCEDURE — 85025 COMPLETE CBC W/AUTO DIFF WBC: CPT | Performed by: STUDENT IN AN ORGANIZED HEALTH CARE EDUCATION/TRAINING PROGRAM

## 2022-07-13 PROCEDURE — 63600175 PHARM REV CODE 636 W HCPCS: Performed by: SURGERY

## 2022-07-13 PROCEDURE — 25000003 PHARM REV CODE 250: Performed by: SURGERY

## 2022-07-13 PROCEDURE — 25000003 PHARM REV CODE 250: Performed by: STUDENT IN AN ORGANIZED HEALTH CARE EDUCATION/TRAINING PROGRAM

## 2022-07-13 PROCEDURE — 36415 COLL VENOUS BLD VENIPUNCTURE: CPT | Performed by: STUDENT IN AN ORGANIZED HEALTH CARE EDUCATION/TRAINING PROGRAM

## 2022-07-13 PROCEDURE — 80053 COMPREHEN METABOLIC PANEL: CPT | Performed by: STUDENT IN AN ORGANIZED HEALTH CARE EDUCATION/TRAINING PROGRAM

## 2022-07-13 PROCEDURE — 86140 C-REACTIVE PROTEIN: CPT | Performed by: STUDENT IN AN ORGANIZED HEALTH CARE EDUCATION/TRAINING PROGRAM

## 2022-07-13 RX ORDER — OXYCODONE HYDROCHLORIDE 10 MG/1
10 TABLET ORAL
Qty: 20 TABLET | Refills: 0 | Status: ON HOLD | OUTPATIENT
Start: 2022-07-13 | End: 2022-10-08 | Stop reason: HOSPADM

## 2022-07-13 RX ORDER — AMOXICILLIN AND CLAVULANATE POTASSIUM 875; 125 MG/1; MG/1
1 TABLET, FILM COATED ORAL EVERY 12 HOURS
Status: DISCONTINUED | OUTPATIENT
Start: 2022-07-13 | End: 2022-07-13 | Stop reason: HOSPADM

## 2022-07-13 RX ORDER — ONDANSETRON 4 MG/1
4 TABLET, ORALLY DISINTEGRATING ORAL EVERY 6 HOURS PRN
Qty: 31 TABLET | Refills: 2 | Status: SHIPPED | OUTPATIENT
Start: 2022-07-13

## 2022-07-13 RX ORDER — AMOXICILLIN AND CLAVULANATE POTASSIUM 875; 125 MG/1; MG/1
1 TABLET, FILM COATED ORAL 2 TIMES DAILY
Qty: 14 TABLET | Refills: 0 | Status: SHIPPED | OUTPATIENT
Start: 2022-07-13 | End: 2022-07-20

## 2022-07-13 RX ADMIN — HYDROCHLOROTHIAZIDE 25 MG: 25 TABLET ORAL at 09:07

## 2022-07-13 RX ADMIN — AMOXICILLIN AND CLAVULANATE POTASSIUM 1 TABLET: 875; 125 TABLET, FILM COATED ORAL at 09:07

## 2022-07-13 RX ADMIN — LEVOTHYROXINE SODIUM 75 MCG: 75 TABLET ORAL at 05:07

## 2022-07-13 RX ADMIN — PIPERACILLIN SODIUM AND TAZOBACTAM SODIUM 4.5 G: 4; .5 INJECTION, POWDER, LYOPHILIZED, FOR SOLUTION INTRAVENOUS at 01:07

## 2022-07-13 NOTE — HOSPITAL COURSE
62 y/o female with PMHx of HTN, DM2, hypothyroidism and diverticulitis is transferred from [facility] due to LLQ abdominal pain.  Pain is LLQ colicky in nature and associated with diarrhea. Denies nausea, vomiting, blood in stool, or changes in color of stool. Patient refers pain has been present since Saturday (7/2/22) where she had previously visited ER for the same symptoms and was discharged with instructions to visit gastroenterologist. Patient has appointment with Colon and Rectal Surgeon but could not wait until then. While in the ER patient had CT scan that shows acute diverticulitis findings with inflammatory and free fluid extension to the left superolateral aspect of the vagina.    Admitted thru ER, cld and LYLY.  She continued to complain of abd pain for several days, remained on cld and labs monitored.  HD 5-6 she began to have purulent vaginal drainage which is thought to be pelvis fluid collection seen on CT.  Abd pain decreased after having vaginal discharge.  On day of discharge pt is renetta regular diet, still with some vaginal drainage, positive for bm with flatus, ambulating in cole without difficulty, adequate pain control with oral medication, VS stable and afebrile.   ONe more week of antibiotics, (oral) and fu 2 weeks with Dr. Cole in [place]

## 2022-07-13 NOTE — ASSESSMENT & PLAN NOTE
Chronic, controlled.  Latest blood pressure and vitals reviewed-   Temp:  [96.5 °F (35.8 °C)-98.7 °F (37.1 °C)]   Pulse:  [78-98]   Resp:  [16-18]   BP: (118-145)/(72-99)   SpO2:  [93 %-98 %] .   Home meds for hypertension were reviewed and noted below.   Hypertension Medications             benazepriL (LOTENSIN) 20 MG tablet Take 20 mg by mouth once daily.    hydroCHLOROthiazide (HYDRODIURIL) 25 MG tablet Take 25 mg by mouth once daily.          While in the hospital, will manage blood pressure as follows; Continue home antihypertensive regimen    Will utilize p.r.n. blood pressure medication only if patient's blood pressure greater than  180/110 and she develops symptoms such as worsening chest pain or shortness of breath.

## 2022-07-13 NOTE — ASSESSMENT & PLAN NOTE
Patient's FSGs are controlled on current medication regimen.  Last A1c reviewed-   Lab Results   Component Value Date    HGBA1C 7.0 (H) 06/23/2022     Most recent fingerstick glucose reviewed-   Recent Labs   Lab 07/12/22  1203 07/12/22  1704   POCTGLUCOSE 71 103     Current correctional scale  High  Maintain anti-hyperglycemic dose as follows-   Antihyperglycemics (From admission, onward)            Start     Stop Route Frequency Ordered    07/07/22 0106  insulin aspart U-100 pen 0-5 Units         -- SubQ Before meals & nightly PRN 07/07/22 0006        Hold Oral hypoglycemics while patient is in the hospital.

## 2022-07-13 NOTE — PLAN OF CARE
[facility]  Discharge Final Note    Primary Care Provider: RAY Rick    Expected Discharge Date: 7/13/2022    Final Discharge Note (most recent)     Final Note - 07/13/22 1336        Final Note    Assessment Type Final Discharge Note     Anticipated Discharge Disposition Home or Self Care        Post-Acute Status    Post-Acute Authorization Other     Other Status No Post-Acute Service Needs                 Future Appointments   Date Time Provider Department Center   8/1/2022  3:00 PM Herman Cole MD [facility]       Jayashree Blue RN, CM   Ext: [phone]

## 2022-07-13 NOTE — NURSING
Discharge instructions reviewed with the patient at the bedside. The patient states understanding of all instructions, including DC medications (and medication schedule), follow-up appointments, and S/S to report to the MD. Printed education was provided on new medications.     Ordered medications were delivered to the patient by the [facility] pharmacy, prior to DC.     VSS. The patient denies pain. Skin is intact; she appears ready and appropriate for DC.     The patient is assisted off unit, via WC, by staff, with all belongings @ 0617.    Betzy Hatch RN

## 2022-07-13 NOTE — DISCHARGE SUMMARY
[facility]  Colorectal Surgery  Discharge Summary      Patient Name: Marlyn Gary  MRN: [id number]  Admission Date: 7/6/2022  Hospital Length of Stay: 7 days  Discharge Date and Time: No discharge date for patient encounter.  Attending Physician: Phill Mallory MD   Discharging Provider: Lexi Lam NP  Primary Care Provider: RAY Rick     HPI:  No notes on file    * No surgery found *     Hospital Course:  60 y/o female with PMHx of HTN, DM2, hypothyroidism and diverticulitis is transferred from [facility] due to LLQ abdominal pain.  Pain is LLQ colicky in nature and associated with diarrhea. Denies nausea, vomiting, blood in stool, or changes in color of stool. Patient refers pain has been present since Saturday (7/2/22) where she had previously visited ER for the same symptoms and was discharged with instructions to visit gastroenterologist. Patient has appointment with Colon and Rectal Surgeon but could not wait until then. While in the ER patient had CT scan that shows acute diverticulitis findings with inflammatory and free fluid extension to the left superolateral aspect of the vagina.    Admitted thru ER, cld and LYLY.  She continued to complain of abd pain for several days, remained on cld and labs monitored.  HD 5-6 she began to have purulent vaginal drainage which is thought to be pelvis fluid collection seen on CT.  Abd pain decreased after having vaginal discharge.  On day of discharge pt is renetta regular diet, still with some vaginal drainage, positive for bm with flatus, ambulating in cole without difficulty, adequate pain control with oral medication, VS stable and afebrile.   ONe more week of antibiotics, (oral) and fu 2 weeks with Dr. Cole in [place]          Goals of Care Treatment Preferences:  Code Status: Full Code      Consults (From admission, onward)        Status Ordering Provider     Inpatient consult to Interventional Radiology  Once         Provider:  Radiology Provider Interventional    Completed JAMSHID RAMIREZ          Significant Diagnostic Studies: Labs:   BMP:   Recent Labs   Lab 07/12/22 0427 07/13/22  0438   GLU 81 101   * 138   K 3.8 3.2*   CL 94* 97   CO2 29 29   BUN 8 8   CREATININE 0.7 0.7   CALCIUM 9.5 9.0    and CBC   Recent Labs   Lab 07/12/22 0427 07/13/22  0438   WBC 10.78 9.90   HGB 12.4 12.4   HCT 37.9 37.7   * 484*       Pending Diagnostic Studies:     None        Final Active Diagnoses:    Diagnosis Date Noted POA    PRINCIPAL PROBLEM:  Diverticulitis large intestine w/o perforation or abscess w/o bleeding [K57.32] 07/06/2022 Yes    Essential hypertension [I10] 07/06/2022 Yes    Diabetes mellitus, type 2 [E11.9] 07/06/2022 Yes    Hypothyroidism [E03.9] 07/06/2022 Unknown    Glaucoma [H40.9] 07/06/2022 Yes      Problems Resolved During this Admission:      Discharged Condition: good    Disposition: Home or Self Care    Follow Up:   Follow-up Information     Herman Cole MD Follow up in 2 week(s).    Specialties: Colon and Rectal Surgery, General Surgery  Contact information:  [address] [place] [address] DR Christopher [place]UEGER [id number] [phone]                       Patient Instructions:      Diet Adult Regular   Order Comments: Low fiber, no fresh fruit or fresh vegetables, no nuts, grapes, popcorn or raisins     Lifting restrictions   Order Comments: No lifting anything greater than 5 pounds     No dressing needed   Order Comments: May shower, no tub bath     Notify your health care provider if you experience any of the following:  temperature >100.4     Notify your health care provider if you experience any of the following:  persistent nausea and vomiting or diarrhea     Notify your health care provider if you experience any of the following:  severe uncontrolled pain     Notify your health care provider if you experience any of the following:  redness, tenderness, or signs of infection (pain, swelling,  redness, odor or green/yellow discharge around incision site)     Notify your health care provider if you experience any of the following:  difficulty breathing or increased cough     Notify your health care provider if you experience any of the following:  severe persistent headache     Notify your health care provider if you experience any of the following:  worsening rash     Notify your health care provider if you experience any of the following:  persistent dizziness, light-headedness, or visual disturbances     Notify your health care provider if you experience any of the following:  increased confusion or weakness     Medications:  Reconciled Home Medications:      Medication List      START taking these medications    ondansetron 4 MG Tbdl  Commonly known as: ZOFRAN-ODT  Dissolve 1 tablet (4 mg total) by mouth every 6 (six) hours as needed for nausea/vomiting     oxyCODONE 10 mg Tab immediate release tablet  Commonly known as: ROXICODONE  Take 1 tablet (10 mg total) by mouth every 3 (three) hours as needed for Pain.        CONTINUE taking these medications    amoxicillin-clavulanate 875-125mg 875-125 mg per tablet  Commonly known as: AUGMENTIN  Take 1 tablet by mouth 2 (two) times daily. for 7 days     benazepriL 20 MG tablet  Commonly known as: LOTENSIN  Take 20 mg by mouth once daily.     bromfenac 0.07 % Drop  Commonly known as: PROLENSA  Apply 1 drop to eye once daily. (both eyes)     COMBIGAN 0.2-0.5 % Drop  Generic drug: brimonidine-timoloL  Place 1 drop into both eyes 2 (two) times daily.     difluprednate 0.05 % Drop ophthalmic solution  Commonly known as: DUREZOL  Place 1 drop into both eyes 4 (four) times daily.     hydroCHLOROthiazide 25 MG tablet  Commonly known as: HYDRODIURIL  Take 25 mg by mouth once daily.     HYDROcodone-acetaminophen 7.5-325 mg per tablet  Commonly known as: NORCO  Take 1 tablet by mouth every 6 (six) hours as needed for Pain.     latanoprost 0.005 % ophthalmic  solution  Place 1 drop into both eyes nightly.     metFORMIN 500 MG tablet  Commonly known as: GLUCOPHAGE  Take 500 mg by mouth 2 (two) times daily.     SYNTHROID 75 MCG tablet  Generic drug: levothyroxine  Take 75 mcg by mouth once daily.            Lexi Lam NP  Colorectal Surgery  [facility]

## 2022-07-13 NOTE — TELEPHONE ENCOUNTER
Called and spoke with patient about scheduling an appointment. Informed patient she is scheduled on August 1 at 3:00 pm with Dr. Cole at the [facility]. Patient verbalized understanding.      ----- Message from Sarah Raman sent at 7/13/2022 12:42 PM CDT -----  Pt is requesting a call back. Pt states that she is just getting out the hospital and cant make appt today but was told that she need to f/u with the doctor in 2 week and cant wait till Oct to be seen. Pt can be reached at [phone] (home)

## 2022-07-14 NOTE — PHYSICIAN QUERY
PT Name: Marlyn Gary  MR #: [id number]     Documentation Clarification      CDS/: Heydi Soni               Contact information: [email or web address]    This form is a permanent document in the medical record.     Query Date: July 14, 2022    By submitting this query, we are merely seeking further clarification of documentation. Please utilize your independent clinical judgment when addressing the question(s) below.    The Medical Record reflects the following:    Supporting Clinical Findings Location in Medical Record   Transferred from [facility] due to LLQ abdominal pain.  Pain has been present since Saturday (7/2/22) where she had previously visited ER for the same symptoms and was discharged with instructions to visit gastroenterologist  While in the ER patient had CT scan that shows acute diverticulitis findings with inflammatory and free fluid extension to the left superolateral aspect of the vagina.    Diverticulitis large intestine w/o perforation or abscess w/o bleeding  Findings of acute diverticulitis on CT scan from 7/6/2022      Has had over 1 month of left lower quadrant pain and diarrhea are related to diverticulitis.  Radiographically, there was concern for a potential developing colovaginal fistula, but the patient reports no history of purulent or feculent drainage per vagina.  She has failed multiple outpatient regimens of Cipro/Flagyl and Augmentin if she continues to have significant left lower quadrant pain and diarrhea     General Surgery H&P 07/07                    CRS Addendum H&P 07/07   If abdominal pain persists and CRP continues to uptrend, may need repeat CT to evaluate for maturing pericolonic abscess.    CRS PN Attestation 07/08   Diverticulitis large intestine w/o perforation or abscess w/o bleeding  CT scan 7/9/2022 demonstrated intrabdominal fluid collection  Consult to IR for evaluation of fluid collection   CRS PN 07/09   Received consult for  possible diverticular abscess drain.   Most recent CT shows small collection adjacent to sigmoid colon, not significantly changed from prior and c/w small diverticular abscess. Collection is too small for drainage catheter placement   IR Consult Note 07/09   Purulent vaginal discharge (poss abscess draining out thru fistula)   CRS PN 07/12   HD 5-6 she began to have purulent vaginal drainage which is thought to be pelvis fluid collection seen on CT.  Abd pain decreased after having vaginal discharge.     Final Active Diagnoses:  Principal Problem: Diverticulitis large intestine w/o perforation or abscess w/o bleeding   Discharge Summary 07/13     Acute diverticulitis findings with inflammatory and free fluid extension to the left superolateral aspect of the vagina, similar when compared to the prior examination.  Questionable fistulization.  CT Abdomen Pelvis 07/06   As previously, there is a long segment of abnormal sigmoid colon with wall thickening and surrounding fat stranding, suggesting inflammatory change, consistent with the patient's known diverticulosis.  Focal 2.9 x 4.1 cm rim enhancing fluid collection again extend extends to the left superolateral aspect of the vagina.  In addition, there are now a few tiny foci of extraluminal air associated with this fluid collection; this could suggest abscess formation, fistulous connection, or micro perforation.  No other definite extraluminal air is seen.     Persistent findings of the patient's known history of sigmoid diverticulitis.  The fluid collection adjacent to the abnormal sigmoid colon now demonstrates rim enhancement and some foci of extraluminal air, suggesting abscess formation, fistulous connection or micro perforation.  Mildly enlarged, likely reactive mesenteric and retroperitoneal nodes are again identified in the left lower quadrant of the abdomen and pelvis.  The inflammatory change adjacent to the sigmoid colon now extends to the superior  aspect of the bladder where there is mild focal wall thickening.   CT Abdomen Pelvis 07/09                                                                            Provider, please further specify the known or suspected above diagnosis of Diverticulitis large intestine:      [   ] Diverticulitis large intestine without perforation or abscess      [ x  ] Diverticulitis large intestine with abscess and/or perforation     [   ] Diverticulitis large intestine with abscess and/or perforation and colovaginal fistula     [   ] Other diagnosis or clarification, please specify: ____________________________     [  ] Clinically undetermined

## 2022-08-01 ENCOUNTER — OFFICE VISIT (OUTPATIENT)
Dept: SURGERY | Facility: CLINIC | Age: 62
End: 2022-08-01
Payer: COMMERCIAL

## 2022-08-01 DIAGNOSIS — Z12.11 SCREEN FOR COLON CANCER: Primary | ICD-10-CM

## 2022-08-01 DIAGNOSIS — R10.32 LEFT LOWER QUADRANT PAIN: ICD-10-CM

## 2022-08-01 PROCEDURE — 99999 PR PBB SHADOW E&M-EST. PATIENT-LVL III: CPT | Mod: PBBFAC,,, | Performed by: COLON & RECTAL SURGERY

## 2022-08-01 PROCEDURE — 99999 PR PBB SHADOW E&M-EST. PATIENT-LVL III: ICD-10-PCS | Mod: PBBFAC,,, | Performed by: COLON & RECTAL SURGERY

## 2022-08-01 PROCEDURE — 1159F PR MEDICATION LIST DOCUMENTED IN MEDICAL RECORD: ICD-10-PCS | Mod: CPTII,S$GLB,, | Performed by: COLON & RECTAL SURGERY

## 2022-08-01 PROCEDURE — 1111F PR DISCHARGE MEDS RECONCILED W/ CURRENT OUTPATIENT MED LIST: ICD-10-PCS | Mod: CPTII,S$GLB,, | Performed by: COLON & RECTAL SURGERY

## 2022-08-01 PROCEDURE — 1159F MED LIST DOCD IN RCRD: CPT | Mod: CPTII,S$GLB,, | Performed by: COLON & RECTAL SURGERY

## 2022-08-01 PROCEDURE — 99214 OFFICE O/P EST MOD 30 MIN: CPT | Mod: S$GLB,,, | Performed by: COLON & RECTAL SURGERY

## 2022-08-01 PROCEDURE — 1111F DSCHRG MED/CURRENT MED MERGE: CPT | Mod: CPTII,S$GLB,, | Performed by: COLON & RECTAL SURGERY

## 2022-08-01 PROCEDURE — 99214 PR OFFICE/OUTPT VISIT, EST, LEVL IV, 30-39 MIN: ICD-10-PCS | Mod: S$GLB,,, | Performed by: COLON & RECTAL SURGERY

## 2022-08-01 PROCEDURE — 4010F ACE/ARB THERAPY RXD/TAKEN: CPT | Mod: CPTII,S$GLB,, | Performed by: COLON & RECTAL SURGERY

## 2022-08-01 PROCEDURE — 4010F PR ACE/ARB THEARPY RXD/TAKEN: ICD-10-PCS | Mod: CPTII,S$GLB,, | Performed by: COLON & RECTAL SURGERY

## 2022-08-01 RX ORDER — SODIUM, POTASSIUM,MAG SULFATES 17.5-3.13G
1 SOLUTION, RECONSTITUTED, ORAL ORAL DAILY
Qty: 1 KIT | Refills: 0 | Status: SHIPPED | OUTPATIENT
Start: 2022-08-01 | End: 2022-08-03

## 2022-08-01 NOTE — PROGRESS NOTES
History & Physical    SUBJECTIVE:     History of Present Illness:  Patient is a 61 y.o. female presents for evaluation of left lower quadrant abdominal pain that has been occurring 1-2 times a year for about 5 years. It has been occurring more consistently over the last month. She has been to the ED 4 times since the beginning of June. She has associated nausea, vomiting, and non-bloody diarrhea associated with the pain. Her last visit to the ED resulted in admission and transfer to [facility]. She started to have gelatinous yellow vaginal discharge two days into the admission, and her abdominal pain improved. Continues with stool and flatus per vagina. No urinary symptoms. Her last colonoscopy was about 2 years ago and had some polyps. She has a family history of two uncles and a sister with colon cancer.      No chief complaint on file.      Review of patient's allergies indicates:  No Known Allergies    Current Outpatient Medications   Medication Sig Dispense Refill    benazepriL (LOTENSIN) 20 MG tablet Take 20 mg by mouth once daily.      bromfenac (PROLENSA) 0.07 % Drop Apply 1 drop to eye once daily. (both eyes)      COMBIGAN 0.2-0.5 % Drop Place 1 drop into both eyes 2 (two) times daily.      difluprednate (DUREZOL) 0.05 % Drop ophthalmic solution Place 1 drop into both eyes 4 (four) times daily.      hydroCHLOROthiazide (HYDRODIURIL) 25 MG tablet Take 25 mg by mouth once daily.      latanoprost 0.005 % ophthalmic solution Place 1 drop into both eyes nightly.      metFORMIN (GLUCOPHAGE) 500 MG tablet Take 500 mg by mouth 2 (two) times daily.      ondansetron (ZOFRAN-ODT) 4 MG TbDL Dissolve 1 tablet (4 mg total) by mouth every 6 (six) hours as needed for nausea/vomiting 31 tablet 2    oxyCODONE (ROXICODONE) 10 mg Tab immediate release tablet Take 1 tablet (10 mg total) by mouth every 3 (three) hours as needed for Pain. 20 tablet 0    SYNTHROID 75 mcg tablet Take 75 mcg by mouth once daily.       HYDROcodone-acetaminophen (NORCO) 7.5-325 mg per tablet Take 1 tablet by mouth every 6 (six) hours as needed for Pain. (Patient not taking: Reported on 8/1/2022) 18 tablet 0     No current facility-administered medications for this visit.       Past Medical History:   Diagnosis Date    Diverticular disease of large intestine without perforation or abscess     HTN (hypertension)     Thyroid disease     Type 2 diabetes mellitus with unspecified diabetic retinopathy without macular edema     Unspecified glaucoma      Past Surgical History:   Procedure Laterality Date    EXTRACTION OF CATARACT Bilateral     HEMORRHOID SURGERY      HYSTERECTOMY      REDUCTION OF BOTH BREASTS       No family history on file.  Social History     Tobacco Use    Smoking status: Never Smoker    Smokeless tobacco: Never Used   Substance Use Topics    Alcohol use: Never    Drug use: Never        Review of Systems:  Review of Systems   Constitutional: Negative for activity change, appetite change, chills, fatigue, fever and unexpected weight change.   HENT: Negative for congestion, ear pain, sore throat and trouble swallowing.    Eyes: Negative for pain, redness and itching.   Respiratory: Negative for cough, shortness of breath, wheezing and stridor.    Cardiovascular: Negative for chest pain, palpitations and leg swelling.   Gastrointestinal: Positive for abdominal pain, blood in stool and rectal pain. Negative for abdominal distention, anal bleeding, constipation, diarrhea, nausea and vomiting.   Endocrine: Negative for cold intolerance, heat intolerance and polyuria.   Genitourinary: Positive for dysuria, frequency and vaginal discharge. Negative for difficulty urinating, flank pain, hematuria and urgency.   Musculoskeletal: Negative for gait problem, joint swelling and neck pain.   Skin: Negative for color change, pallor, rash and wound.   Allergic/Immunologic: Negative for environmental allergies and immunocompromised state.    Neurological: Negative for dizziness, speech difficulty, weakness and numbness.   Hematological: Does not bruise/bleed easily.   Psychiatric/Behavioral: Negative for agitation, confusion and hallucinations.       OBJECTIVE:     Vital Signs (Most Recent)              Physical Exam:  Physical Exam  Vitals reviewed.   Constitutional:       General: She is not in acute distress.     Appearance: Normal appearance. She is well-developed. She is not ill-appearing.   HENT:      Head: Normocephalic and atraumatic.      Right Ear: External ear normal.      Left Ear: External ear normal.   Eyes:      Extraocular Movements: Extraocular movements intact.      Conjunctiva/sclera: Conjunctivae normal.   Cardiovascular:      Rate and Rhythm: Normal rate.   Pulmonary:      Effort: Pulmonary effort is normal. No respiratory distress.   Abdominal:      Comments: Soft, non-distended. Mild LLQ TTP. Well-healed scars from lap cholecystectomy and Pfannenstiel incision   Musculoskeletal:      Cervical back: Neck supple.   Skin:     General: Skin is warm and dry.   Neurological:      Mental Status: She is alert and oriented to person, place, and time.   Psychiatric:         Behavior: Behavior normal.       Diagnostic Results:  All recent CT scans reviewed.     ASSESSMENT/PLAN:     60 y/o female with perforate sigmoid diverticulitis complicated by likely colovaginal fistula but clinically improving now    - will repeat CT Abd/Pelvis with PO, IV and rectal contrast for repeat evaluation, including defining exact location of suspected colovaginal fistula  - Colonoscopy 9/9/22, suprep called in.  - CBC, CMP  - Discussed diverticular disease and fistulas. Advised that this will likely need surgical intervention via colectomy after further workup.  - RTC following colonoscopy or sooner if necessary    Herman Cole MD  Colon and Rectal Surgery  [facility]

## 2022-08-05 ENCOUNTER — LAB VISIT (OUTPATIENT)
Dept: LAB | Facility: HOSPITAL | Age: 62
End: 2022-08-05
Payer: COMMERCIAL

## 2022-08-05 DIAGNOSIS — R10.32 LEFT LOWER QUADRANT PAIN: ICD-10-CM

## 2022-08-05 LAB
ALBUMIN SERPL BCP-MCNC: 3.6 G/DL (ref 3.5–5.2)
ALP SERPL-CCNC: 74 U/L (ref 55–135)
ALT SERPL W/O P-5'-P-CCNC: 27 U/L (ref 10–44)
ANION GAP SERPL CALC-SCNC: 10 MMOL/L (ref 8–16)
AST SERPL-CCNC: 22 U/L (ref 10–40)
BASOPHILS # BLD AUTO: 0.09 K/UL (ref 0–0.2)
BASOPHILS NFR BLD: 1 % (ref 0–1.9)
BILIRUB SERPL-MCNC: 0.5 MG/DL (ref 0.1–1)
BUN SERPL-MCNC: 10 MG/DL (ref 8–23)
CALCIUM SERPL-MCNC: 10.2 MG/DL (ref 8.7–10.5)
CHLORIDE SERPL-SCNC: 101 MMOL/L (ref 95–110)
CO2 SERPL-SCNC: 25 MMOL/L (ref 23–29)
CREAT SERPL-MCNC: 0.7 MG/DL (ref 0.5–1.4)
DIFFERENTIAL METHOD: ABNORMAL
EOSINOPHIL # BLD AUTO: 0.2 K/UL (ref 0–0.5)
EOSINOPHIL NFR BLD: 2.1 % (ref 0–8)
ERYTHROCYTE [DISTWIDTH] IN BLOOD BY AUTOMATED COUNT: 14.1 % (ref 11.5–14.5)
EST. GFR  (NO RACE VARIABLE): >60 ML/MIN/1.73 M^2
GLUCOSE SERPL-MCNC: 108 MG/DL (ref 70–110)
HCT VFR BLD AUTO: 41.9 % (ref 37–48.5)
HGB BLD-MCNC: 13.2 G/DL (ref 12–16)
IMM GRANULOCYTES # BLD AUTO: 0.02 K/UL (ref 0–0.04)
IMM GRANULOCYTES NFR BLD AUTO: 0.2 % (ref 0–0.5)
LYMPHOCYTES # BLD AUTO: 3.6 K/UL (ref 1–4.8)
LYMPHOCYTES NFR BLD: 41.9 % (ref 18–48)
MCH RBC QN AUTO: 26.9 PG (ref 27–31)
MCHC RBC AUTO-ENTMCNC: 31.5 G/DL (ref 32–36)
MCV RBC AUTO: 86 FL (ref 82–98)
MONOCYTES # BLD AUTO: 0.6 K/UL (ref 0.3–1)
MONOCYTES NFR BLD: 7.3 % (ref 4–15)
NEUTROPHILS # BLD AUTO: 4.1 K/UL (ref 1.8–7.7)
NEUTROPHILS NFR BLD: 47.5 % (ref 38–73)
NRBC BLD-RTO: 0 /100 WBC
PLATELET # BLD AUTO: 438 K/UL (ref 150–450)
PMV BLD AUTO: 9.6 FL (ref 9.2–12.9)
POTASSIUM SERPL-SCNC: 4 MMOL/L (ref 3.5–5.1)
PROT SERPL-MCNC: 8.1 G/DL (ref 6–8.4)
RBC # BLD AUTO: 4.9 M/UL (ref 4–5.4)
SODIUM SERPL-SCNC: 136 MMOL/L (ref 136–145)
WBC # BLD AUTO: 8.63 K/UL (ref 3.9–12.7)

## 2022-08-05 PROCEDURE — 80053 COMPREHEN METABOLIC PANEL: CPT

## 2022-08-05 PROCEDURE — 85025 COMPLETE CBC W/AUTO DIFF WBC: CPT

## 2022-08-05 PROCEDURE — 36415 COLL VENOUS BLD VENIPUNCTURE: CPT

## 2022-08-17 ENCOUNTER — HOSPITAL ENCOUNTER (OUTPATIENT)
Dept: RADIOLOGY | Facility: HOSPITAL | Age: 62
Discharge: HOME | End: 2022-08-17
Payer: COMMERCIAL

## 2022-08-17 DIAGNOSIS — R10.32 LEFT LOWER QUADRANT PAIN: ICD-10-CM

## 2022-08-17 PROCEDURE — 74177 CT ABD & PELVIS W/CONTRAST: CPT | Mod: TC

## 2022-08-17 PROCEDURE — 25500020 PHARM REV CODE 255

## 2022-08-17 PROCEDURE — A9698 NON-RAD CONTRAST MATERIALNOC: HCPCS

## 2022-08-17 RX ADMIN — IOHEXOL 100 ML: 350 INJECTION, SOLUTION INTRAVENOUS at 05:08

## 2022-08-17 RX ADMIN — IOHEXOL 1000 ML: 9 SOLUTION ORAL at 05:08

## 2022-08-17 RX ADMIN — IOHEXOL 40 ML: 350 INJECTION, SOLUTION INTRAVENOUS at 05:08

## 2022-08-29 ENCOUNTER — TELEPHONE (OUTPATIENT)
Dept: SURGERY | Facility: CLINIC | Age: 62
End: 2022-08-29
Payer: COMMERCIAL

## 2022-08-29 NOTE — TELEPHONE ENCOUNTER
No answer, left a voicemail with callback number [phone] if calling today, and [phone] if calling me back tomorrow.     ----- Message from Cheyannemerlinmanan Colmenares sent at 8/29/2022  2:15 PM CDT -----  Contact: [phone]  Pt would like to consult with a nurse in regards to frequent ball movements she is experiencing. She stated that it is causing  irration at her vaginal area, and would like to know if she could be prescribe something for it. Please call her back at [phone].          [id number] - [place] - [address] AT [facility]  [address] [place] [address]  Phone: [phone] Fax: [phone]        Thanks   KB

## 2022-08-29 NOTE — TELEPHONE ENCOUNTER
Spoke with patient, recommended her using a barrier cream such as Sahara's or Aquaphor and a caterina-bottle to clean her vaginal and bottom after using the bathroom. Patient verbalized understanding.     ----- Message from Beatrice Rocha sent at 8/29/2022  4:21 PM CDT -----  Contact: MINERVA KWAN [[id number]]  .Type:  Patient Returning Call    Who Called:MINERVA KWAN [[id number]]  Who Left Message for Patient:nurse  Does the patient know what this is regarding?:  Would the patient rather a call back or a response via [facility]? Call  Best Call Back Number:[phone]  Additional Information:

## 2022-09-02 ENCOUNTER — TELEPHONE (OUTPATIENT)
Dept: PREADMISSION TESTING | Facility: HOSPITAL | Age: 62
End: 2022-09-02
Payer: COMMERCIAL

## 2022-09-02 NOTE — TELEPHONE ENCOUNTER
Mrs Gary is scheduled for Colonoscopy on 09/09/2022. Our policy is to schedule pt 8-12 weeks post antibiotics for newly diagnosed diverticulitis. She reports she completed antibiotics during her July 6-13th hospital stay only, and did not take any oral antibiotics at home. I just wanted to confirm that she is cleared to undergo colonoscopy on 09/09/22 antibiotics not taken. Please advise.

## 2022-09-09 ENCOUNTER — HOSPITAL ENCOUNTER (OUTPATIENT)
Facility: HOSPITAL | Age: 62
Discharge: HOME | End: 2022-09-09
Attending: COLON & RECTAL SURGERY | Admitting: COLON & RECTAL SURGERY
Payer: COMMERCIAL

## 2022-09-09 ENCOUNTER — ANESTHESIA EVENT (OUTPATIENT)
Dept: ENDOSCOPY | Facility: HOSPITAL | Age: 62
End: 2022-09-09
Payer: COMMERCIAL

## 2022-09-09 ENCOUNTER — ANESTHESIA (OUTPATIENT)
Dept: ENDOSCOPY | Facility: HOSPITAL | Age: 62
End: 2022-09-09
Payer: COMMERCIAL

## 2022-09-09 VITALS
HEART RATE: 77 BPM | RESPIRATION RATE: 16 BRPM | WEIGHT: 180 LBS | BODY MASS INDEX: 29.99 KG/M2 | SYSTOLIC BLOOD PRESSURE: 113 MMHG | OXYGEN SATURATION: 97 % | HEIGHT: 65 IN | DIASTOLIC BLOOD PRESSURE: 88 MMHG | TEMPERATURE: 98 F

## 2022-09-09 DIAGNOSIS — Z12.11 SCREENING FOR COLON CANCER: ICD-10-CM

## 2022-09-09 LAB
GLUCOSE SERPL-MCNC: 101 MG/DL (ref 70–110)
POCT GLUCOSE: 101 MG/DL (ref 70–110)

## 2022-09-09 PROCEDURE — 63600175 PHARM REV CODE 636 W HCPCS: Performed by: NURSE ANESTHETIST, CERTIFIED REGISTERED

## 2022-09-09 PROCEDURE — G0105 COLORECTAL SCRN; HI RISK IND: HCPCS | Mod: ,,, | Performed by: COLON & RECTAL SURGERY

## 2022-09-09 PROCEDURE — G0105 COLORECTAL SCRN; HI RISK IND: HCPCS | Performed by: COLON & RECTAL SURGERY

## 2022-09-09 PROCEDURE — 25000003 PHARM REV CODE 250: Performed by: COLON & RECTAL SURGERY

## 2022-09-09 PROCEDURE — 37000009 HC ANESTHESIA EA ADD 15 MINS: Performed by: COLON & RECTAL SURGERY

## 2022-09-09 PROCEDURE — 25000003 PHARM REV CODE 250: Performed by: NURSE ANESTHETIST, CERTIFIED REGISTERED

## 2022-09-09 PROCEDURE — G0105 COLORECTAL SCRN; HI RISK IND: ICD-10-PCS | Mod: ,,, | Performed by: COLON & RECTAL SURGERY

## 2022-09-09 PROCEDURE — 37000008 HC ANESTHESIA 1ST 15 MINUTES: Performed by: COLON & RECTAL SURGERY

## 2022-09-09 RX ORDER — PROPOFOL 10 MG/ML
VIAL (ML) INTRAVENOUS
Status: DISCONTINUED | OUTPATIENT
Start: 2022-09-09 | End: 2022-09-09

## 2022-09-09 RX ORDER — DEXTROMETHORPHAN/PSEUDOEPHED 2.5-7.5/.8
DROPS ORAL
Status: DISCONTINUED | OUTPATIENT
Start: 2022-09-09 | End: 2022-09-09 | Stop reason: HOSPADM

## 2022-09-09 RX ORDER — LIDOCAINE HYDROCHLORIDE 20 MG/ML
INJECTION, SOLUTION EPIDURAL; INFILTRATION; INTRACAUDAL; PERINEURAL
Status: DISCONTINUED | OUTPATIENT
Start: 2022-09-09 | End: 2022-09-09

## 2022-09-09 RX ADMIN — PROPOFOL 20 MG: 10 INJECTION, EMULSION INTRAVENOUS at 11:09

## 2022-09-09 RX ADMIN — PROPOFOL 80 MG: 10 INJECTION, EMULSION INTRAVENOUS at 11:09

## 2022-09-09 RX ADMIN — LIDOCAINE HYDROCHLORIDE 50 MG: 20 INJECTION, SOLUTION EPIDURAL; INFILTRATION; INTRACAUDAL; PERINEURAL at 11:09

## 2022-09-09 RX ADMIN — PROPOFOL 40 MG: 10 INJECTION, EMULSION INTRAVENOUS at 11:09

## 2022-09-09 RX ADMIN — SODIUM CHLORIDE, SODIUM LACTATE, POTASSIUM CHLORIDE, AND CALCIUM CHLORIDE: .6; .31; .03; .02 INJECTION, SOLUTION INTRAVENOUS at 10:09

## 2022-09-09 NOTE — H&P
[facility] ([facility])  Colon and Rectal Surgery  History & Physical    Patient Name: Marlyn Gary  MRN: [id number]  Admission Date: 9/9/2022  Attending Physician: Herman Cole MD  Primary Care Provider: RAY Rick    Patient information was obtained from patient and medical records.    Subjective:     Chief Complaint/Reason for Admission: Here for Colonoscopy    History of Present Illness:  Patient is a 62 y.o. female presents for colonoscopy. Previous colonoscopies with polyps and +fam hx of CRC. Recent perforated diverticulitis with possible fistula. No current hematochezia, melena or change in bowel habits.    No current facility-administered medications on file prior to encounter.     Current Outpatient Medications on File Prior to Encounter   Medication Sig    benazepriL (LOTENSIN) 20 MG tablet Take 20 mg by mouth once daily.    hydroCHLOROthiazide (HYDRODIURIL) 25 MG tablet Take 25 mg by mouth once daily.    metFORMIN (GLUCOPHAGE) 500 MG tablet Take 500 mg by mouth 2 (two) times daily.    SYNTHROID 75 mcg tablet Take 75 mcg by mouth once daily.    bromfenac (PROLENSA) 0.07 % Drop Apply 1 drop to eye once daily. (both eyes)    COMBIGAN 0.2-0.5 % Drop Place 1 drop into both eyes 2 (two) times daily.    difluprednate (DUREZOL) 0.05 % Drop ophthalmic solution Place 1 drop into both eyes 4 (four) times daily.    HYDROcodone-acetaminophen (NORCO) 7.5-325 mg per tablet Take 1 tablet by mouth every 6 (six) hours as needed for Pain. (Patient not taking: No sig reported)    latanoprost 0.005 % ophthalmic solution Place 1 drop into both eyes nightly.    ondansetron (ZOFRAN-ODT) 4 MG TbDL Dissolve 1 tablet (4 mg total) by mouth every 6 (six) hours as needed for nausea/vomiting    oxyCODONE (ROXICODONE) 10 mg Tab immediate release tablet Take 1 tablet (10 mg total) by mouth every 3 (three) hours as needed for Pain.       Review of patient's allergies indicates:  No Known Allergies    Past  Medical History:   Diagnosis Date    Diverticular disease of large intestine without perforation or abscess     HTN (hypertension)     Thyroid disease     Type 2 diabetes mellitus with unspecified diabetic retinopathy without macular edema     Unspecified glaucoma      Past Surgical History:   Procedure Laterality Date    EXTRACTION OF CATARACT Bilateral     HEMORRHOID SURGERY      HYSTERECTOMY      REDUCTION OF BOTH BREASTS       Family History    None       Tobacco Use    Smoking status: Never    Smokeless tobacco: Never   Substance and Sexual Activity    Alcohol use: Never    Drug use: Never    Sexual activity: Not on file     Review of Systems   Constitutional:  Negative for activity change, appetite change, chills, fatigue, fever and unexpected weight change.   HENT:  Negative for congestion, ear pain, sore throat and trouble swallowing.    Eyes:  Negative for pain, redness and itching.   Respiratory:  Negative for cough, shortness of breath and wheezing.    Cardiovascular:  Negative for chest pain, palpitations and leg swelling.   Gastrointestinal:  Negative for abdominal distention, abdominal pain, anal bleeding, blood in stool, constipation, diarrhea, nausea, rectal pain and vomiting.   Endocrine: Negative for cold intolerance, heat intolerance and polyuria.   Genitourinary:  Negative for dysuria, flank pain, frequency and hematuria.   Musculoskeletal:  Negative for gait problem, joint swelling and neck pain.   Skin:  Negative for color change, rash and wound.   Allergic/Immunologic: Negative for environmental allergies and immunocompromised state.   Neurological:  Negative for dizziness, speech difficulty, weakness and numbness.   Psychiatric/Behavioral:  Negative for agitation, confusion and hallucinations.    Objective:     Vital Signs (Most Recent):  Temp: 97.8 °F (36.6 °C) (09/09/22 1022)  Pulse: 90 (09/09/22 1022)  Resp: 20 (09/09/22 1022)  BP: (!) 153/93 (09/09/22 1022)  SpO2: 98 % (09/09/22 1022)    Vital Signs (24h Range):  Temp:  [97.8 °F (36.6 °C)] 97.8 °F (36.6 °C)  Pulse:  [90] 90  Resp:  [20] 20  SpO2:  [98 %] 98 %  BP: (153)/(93) 153/93     Weight: 81.6 kg (180 lb)  Body mass index is 30.42 kg/m².    Physical Exam  Constitutional:       Appearance: She is well-developed.   HENT:      Head: Normocephalic and atraumatic.   Eyes:      Conjunctiva/sclera: Conjunctivae normal.   Neck:      Thyroid: No thyromegaly.   Cardiovascular:      Rate and Rhythm: Normal rate and regular rhythm.   Pulmonary:      Effort: Pulmonary effort is normal. No respiratory distress.   Abdominal:      General: There is no distension.      Palpations: Abdomen is soft. There is no mass.      Tenderness: There is no abdominal tenderness.   Musculoskeletal:         General: No tenderness. Normal range of motion.      Cervical back: Normal range of motion.   Skin:     General: Skin is warm and dry.      Capillary Refill: Capillary refill takes less than 2 seconds.      Findings: No rash.   Neurological:      Mental Status: She is alert and oriented to person, place, and time.         Assessment/Plan:     Patient is a 62 y.o. female who presents for colonoscopy     - Ok to proceed to endoscopy suite for colonoscopy  - Consent obtained. All risks, benefits and alternatives fully explained to patient, including but not limited to bleeding, infection, perforation, and missed polyps. All questions appropriately answered to patient's satisfaction. Consent signed and placed on chart.    There are no hospital problems to display for this patient.    VTE Risk Mitigation (From admission, onward)      None            Herman Cole MD  Colon and Rectal Surgery  [facility] ([facility])

## 2022-09-09 NOTE — ANESTHESIA POSTPROCEDURE EVALUATION
Anesthesia Post Evaluation    Patient: Marlyn Gary    Procedure(s) Performed: Procedure(s) (LRB):  COLONOSCOPY (N/A)    Final Anesthesia Type: MAC      Patient location during evaluation: PACU  Patient participation: Yes- Able to Participate  Level of consciousness: awake and alert  Post-procedure vital signs: reviewed and stable  Pain management: adequate  Airway patency: patent    PONV status at discharge: No PONV  Anesthetic complications: no      Cardiovascular status: blood pressure returned to baseline  Respiratory status: unassisted  Hydration status: euvolemic  Follow-up not needed.          Vitals Value Taken Time   /66 09/09/22 1153   Temp 98 09/09/22 1153   Pulse 66 09/09/22 1153   Resp 12 09/09/22 1153   SpO2 98 09/09/22 1153         No case tracking events are documented in the log.      Pain/Arvind Score: No data recorded

## 2022-09-09 NOTE — ANESTHESIA PREPROCEDURE EVALUATION
09/09/2022  Marlyn Gary is a 62 y.o., female.      Pre-op Assessment    I have reviewed the Patient Summary Reports.     I have reviewed the Nursing Notes. I have reviewed the NPO Status.   I have reviewed the Medications.     Review of Systems  Anesthesia Hx:  No problems with previous Anesthesia    Social:  Non-Smoker    Hematology/Oncology:  Hematology Normal   Oncology Normal     EENT/Dental:EENT/Dental Normal   Cardiovascular:   Hypertension, well controlled    Pulmonary:  Pulmonary Normal    Renal/:  Renal/ Normal     Hepatic/GI:  Hepatic/GI Normal Bowel Prep.    Musculoskeletal:  Musculoskeletal Normal    Neurological:  Neurology Normal    Endocrine:   Diabetes, well controlled, type 2 Hypothyroidism    Dermatological:  Skin Normal    Psych:  Psychiatric Normal           Physical Exam  General: Well nourished    Airway:  Mallampati: II   Mouth Opening: Normal  TM Distance: Normal  Tongue: Normal  Neck ROM: Normal ROM    Dental:  Intact    Chest/Lungs:  Clear to auscultation    Heart:  Rate: Normal        Anesthesia Plan  Type of Anesthesia, risks & benefits discussed:    Anesthesia Type: MAC  Intra-op Monitoring Plan: Standard ASA Monitors  Induction:  IV  Informed Consent: Informed consent signed with the Patient and all parties understand the risks and agree with anesthesia plan.  All questions answered. Patient consented to blood products? Yes  ASA Score: 2    Ready For Surgery From Anesthesia Perspective.     .

## 2022-09-09 NOTE — ANESTHESIA RELEASE NOTE
"Anesthesia Release from PACU Note    Patient: Marlyn Gary    Procedure(s) Performed: Procedure(s) (LRB):  COLONOSCOPY (N/A)    Anesthesia type: MAC    Post pain: Adequate analgesia    Post assessment: no apparent anesthetic complications    Last Vitals:   Visit Vitals  BP (!) 153/93 (BP Location: Right leg, Patient Position: Lying)   Pulse 90   Temp 36.6 °C (97.8 °F) (Temporal)   Resp 20   Ht 5' 4.5" (1.638 m)   Wt 81.6 kg (180 lb)   SpO2 98%   Breastfeeding No   BMI 30.42 kg/m²       Post vital signs: stable    Level of consciousness: awake    Nausea/Vomiting: no nausea/no vomiting    Complications: none    Airway Patency: patent    Respiratory: unassisted    Cardiovascular: stable and blood pressure at baseline    Hydration: euvolemic  "

## 2022-09-09 NOTE — TRANSFER OF CARE
"Anesthesia Transfer of Care Note    Patient: Marlyn Gary    Procedure(s) Performed: Procedure(s) (LRB):  COLONOSCOPY (N/A)    Patient location: PACU    Anesthesia Type: MAC    Transport from OR: Transported from OR on room air with adequate spontaneous ventilation    Post pain: adequate analgesia    Post assessment: no apparent anesthetic complications    Post vital signs: stable    Level of consciousness: awake    Nausea/Vomiting: no nausea/vomiting    Complications: none    Transfer of care protocol was followed      Last vitals:   Visit Vitals  BP (!) 153/93 (BP Location: Right leg, Patient Position: Lying)   Pulse 90   Temp 36.6 °C (97.8 °F) (Temporal)   Resp 20   Ht 5' 4.5" (1.638 m)   Wt 81.6 kg (180 lb)   SpO2 98%   Breastfeeding No   BMI 30.42 kg/m²     "

## 2022-09-09 NOTE — PROVATION PATIENT INSTRUCTIONS
Discharge Summary/Instructions after an Endoscopic Procedure  Patient Name: Marlyn Gary  Patient MRN: [id number]  Patient YOB: 1960 Friday, September 9, 2022 Herman Cole MD  Dear patient,  As a result of recent federal legislation (The Federal Cures Act), you may   receive lab or pathology results from your procedure in your [facility]   account before your physician is able to contact you. Your physician or   their representative will relay the results to you with their   recommendations at their soonest availability.  Thank you,  RESTRICTIONS:  During your procedure today, you received medications for sedation.  These   medications may affect your judgment, balance and coordination.  Therefore,   for 24 hours, you have the following restrictions:   - DO NOT drive a car, operate machinery, make legal/financial decisions,   sign important papers or drink alcohol.    ACTIVITY:  Today: no heavy lifting, straining or running due to procedural   sedation/anesthesia.  The following day: return to full activity including work.  DIET:  Eat and drink normally unless instructed otherwise.     TREATMENT FOR COMMON SIDE EFFECTS:  - Mild abdominal pain, nausea, belching, bloating or excessive gas:  rest,   eat lightly and use a heating pad.  - Sore Throat: treat with throat lozenges and/or gargle with warm salt   water.  - Because air was used during the procedure, expelling large amounts of air   from your rectum or belching is normal.  - If a bowel prep was taken, you may not have a bowel movement for 1-3 days.    This is normal.  SYMPTOMS TO WATCH FOR AND REPORT TO YOUR PHYSICIAN:  1. Abdominal pain or bloating, other than gas cramps.  2. Chest pain.  3. Back pain.  4. Signs of infection such as: chills or fever occurring within 24 hours   after the procedure.  5. Rectal bleeding, which would show as bright red, maroon, or black stools.   (A tablespoon of blood from the rectum is not serious, especially  if   hemorrhoids are present.)  6. Vomiting.  7. Weakness or dizziness.  GO DIRECTLY TO THE NEAREST EMERGENCY ROOM IF YOU HAVE ANY OF THE FOLLOWING:      Difficulty breathing              Chills and/or fever over 101 F   Persistent vomiting and/or vomiting blood   Severe abdominal pain   Severe chest pain   Black, tarry stools   Bleeding- more than one tablespoon   Any other symptom or condition that you feel may need urgent attention  Your doctor recommends these additional instructions:  If any biopsies were taken, your doctors clinic will contact you in 1 to 2   weeks with any results.  - Discharge patient to home.   - High fiber diet.   - Continue present medications.   - Await pathology results.   - Repeat colonoscopy in 5 years for surveillance.   - Return to primary care physician PRN.  For questions, problems or results please call your physician Herman Cole MD at Work:  [phone]  If you have any questions about the above instructions, call the GI   department at [phone] or call the endoscopy unit at [phone]   from 7am until 3 pm.  [facility], EMERGENCY ROOM PHONE NUMBER:   [phone]  IF A COMPLICATION OR EMERGENCY SITUATION ARISES AND YOU ARE UNABLE TO REACH   YOUR PHYSICIAN - GO DIRECTLY TO THE EMERGENCY ROOM.  I have read or have had read to me these discharge instructions for my   procedure and have received a written copy.  I understand these   instructions and will follow-up with my physician if I have any questions.     __________________________________       _____________________________________  Nurse Signature                                          Patient/Designated   Responsible Party Signature  MD Herman Cooper MD  9/9/2022 12:01:06 PM  This report has been verified and signed electronically.  Dear patient,  As a result of recent federal legislation (The Federal Cures Act), you may   receive lab or pathology  results from your procedure in your [email or web address]   account before your physician is able to contact you. Your physician or   their representative will relay the results to you with their   recommendations at their soonest availability.  Thank you,  PROVATION

## 2022-09-19 ENCOUNTER — OFFICE VISIT (OUTPATIENT)
Dept: SURGERY | Facility: CLINIC | Age: 62
End: 2022-09-19
Payer: COMMERCIAL

## 2022-09-19 ENCOUNTER — HOSPITAL ENCOUNTER (OUTPATIENT)
Dept: CARDIOLOGY | Facility: HOSPITAL | Age: 62
Discharge: HOME | End: 2022-09-19
Attending: COLON & RECTAL SURGERY
Payer: COMMERCIAL

## 2022-09-19 VITALS
SYSTOLIC BLOOD PRESSURE: 147 MMHG | WEIGHT: 177.94 LBS | DIASTOLIC BLOOD PRESSURE: 100 MMHG | BODY MASS INDEX: 30.07 KG/M2 | TEMPERATURE: 97 F | HEART RATE: 82 BPM

## 2022-09-19 DIAGNOSIS — K57.92 DIVERTICULITIS: ICD-10-CM

## 2022-09-19 DIAGNOSIS — N82.4 COLOVAGINAL FISTULA: Primary | ICD-10-CM

## 2022-09-19 DIAGNOSIS — K57.32 DIVERTICULITIS LARGE INTESTINE W/O PERFORATION OR ABSCESS W/O BLEEDING: ICD-10-CM

## 2022-09-19 DIAGNOSIS — K57.92 DIVERTICULITIS: Primary | ICD-10-CM

## 2022-09-19 PROCEDURE — 4010F PR ACE/ARB THEARPY RXD/TAKEN: ICD-10-PCS | Mod: CPTII,S$GLB,, | Performed by: COLON & RECTAL SURGERY

## 2022-09-19 PROCEDURE — 99214 PR OFFICE/OUTPT VISIT, EST, LEVL IV, 30-39 MIN: ICD-10-PCS | Mod: S$GLB,,, | Performed by: COLON & RECTAL SURGERY

## 2022-09-19 PROCEDURE — 1159F MED LIST DOCD IN RCRD: CPT | Mod: CPTII,S$GLB,, | Performed by: COLON & RECTAL SURGERY

## 2022-09-19 PROCEDURE — 1159F PR MEDICATION LIST DOCUMENTED IN MEDICAL RECORD: ICD-10-PCS | Mod: CPTII,S$GLB,, | Performed by: COLON & RECTAL SURGERY

## 2022-09-19 PROCEDURE — 93010 EKG 12-LEAD: ICD-10-PCS | Mod: ,,, | Performed by: INTERNAL MEDICINE

## 2022-09-19 PROCEDURE — 99214 OFFICE O/P EST MOD 30 MIN: CPT | Mod: S$GLB,,, | Performed by: COLON & RECTAL SURGERY

## 2022-09-19 PROCEDURE — 99999 PR PBB SHADOW E&M-EST. PATIENT-LVL IV: ICD-10-PCS | Mod: PBBFAC,,, | Performed by: COLON & RECTAL SURGERY

## 2022-09-19 PROCEDURE — 93005 ELECTROCARDIOGRAM TRACING: CPT

## 2022-09-19 PROCEDURE — 93010 ELECTROCARDIOGRAM REPORT: CPT | Mod: ,,, | Performed by: INTERNAL MEDICINE

## 2022-09-19 PROCEDURE — 3080F PR MOST RECENT DIASTOLIC BLOOD PRESSURE >= 90 MM HG: ICD-10-PCS | Mod: CPTII,S$GLB,, | Performed by: COLON & RECTAL SURGERY

## 2022-09-19 PROCEDURE — 3077F SYST BP >= 140 MM HG: CPT | Mod: CPTII,S$GLB,, | Performed by: COLON & RECTAL SURGERY

## 2022-09-19 PROCEDURE — 99999 PR PBB SHADOW E&M-EST. PATIENT-LVL IV: CPT | Mod: PBBFAC,,, | Performed by: COLON & RECTAL SURGERY

## 2022-09-19 PROCEDURE — 3008F PR BODY MASS INDEX (BMI) DOCUMENTED: ICD-10-PCS | Mod: CPTII,S$GLB,, | Performed by: COLON & RECTAL SURGERY

## 2022-09-19 PROCEDURE — 4010F ACE/ARB THERAPY RXD/TAKEN: CPT | Mod: CPTII,S$GLB,, | Performed by: COLON & RECTAL SURGERY

## 2022-09-19 PROCEDURE — 3008F BODY MASS INDEX DOCD: CPT | Mod: CPTII,S$GLB,, | Performed by: COLON & RECTAL SURGERY

## 2022-09-19 PROCEDURE — 3077F PR MOST RECENT SYSTOLIC BLOOD PRESSURE >= 140 MM HG: ICD-10-PCS | Mod: CPTII,S$GLB,, | Performed by: COLON & RECTAL SURGERY

## 2022-09-19 PROCEDURE — 3080F DIAST BP >= 90 MM HG: CPT | Mod: CPTII,S$GLB,, | Performed by: COLON & RECTAL SURGERY

## 2022-09-19 RX ORDER — CELECOXIB 100 MG/1
400 CAPSULE ORAL
Status: CANCELLED | OUTPATIENT
Start: 2022-09-19 | End: 2022-09-19

## 2022-09-19 RX ORDER — ONDANSETRON 2 MG/ML
4 INJECTION INTRAMUSCULAR; INTRAVENOUS EVERY 12 HOURS PRN
Status: CANCELLED | OUTPATIENT
Start: 2022-09-19

## 2022-09-19 RX ORDER — POLYETHYLENE GLYCOL 3350 17 G/17G
238 POWDER, FOR SOLUTION ORAL DAILY
Qty: 1 EACH | Refills: 0 | Status: ON HOLD | OUTPATIENT
Start: 2022-09-19 | End: 2022-10-06

## 2022-09-19 RX ORDER — ACETAMINOPHEN 325 MG/1
1000 TABLET ORAL ONCE
Status: CANCELLED | OUTPATIENT
Start: 2022-09-19 | End: 2022-09-19

## 2022-09-19 RX ORDER — NEOMYCIN SULFATE 500 MG/1
1000 TABLET ORAL 3 TIMES DAILY
Qty: 6 TABLET | Refills: 0 | Status: ON HOLD | OUTPATIENT
Start: 2022-09-19 | End: 2022-10-06

## 2022-09-19 RX ORDER — SODIUM CHLORIDE, SODIUM LACTATE, POTASSIUM CHLORIDE, CALCIUM CHLORIDE 600; 310; 30; 20 MG/100ML; MG/100ML; MG/100ML; MG/100ML
INJECTION, SOLUTION INTRAVENOUS CONTINUOUS
Status: CANCELLED | OUTPATIENT
Start: 2022-09-19

## 2022-09-19 RX ORDER — GABAPENTIN 100 MG/1
800 CAPSULE ORAL
Status: CANCELLED | OUTPATIENT
Start: 2022-09-19 | End: 2022-09-19

## 2022-09-19 RX ORDER — METRONIDAZOLE 500 MG/100ML
500 INJECTION, SOLUTION INTRAVENOUS
Status: CANCELLED | OUTPATIENT
Start: 2022-09-19

## 2022-09-19 RX ORDER — METRONIDAZOLE 500 MG/1
500 TABLET ORAL 3 TIMES DAILY
Qty: 3 TABLET | Refills: 0 | Status: ON HOLD | OUTPATIENT
Start: 2022-09-19 | End: 2022-10-06

## 2022-09-19 RX ORDER — HEPARIN SODIUM 5000 [USP'U]/ML
5000 INJECTION, SOLUTION INTRAVENOUS; SUBCUTANEOUS
Status: CANCELLED | OUTPATIENT
Start: 2022-09-19 | End: 2022-09-19

## 2022-09-19 NOTE — H&P (VIEW-ONLY)
History & Physical    SUBJECTIVE:     History of Present Illness:  Patient is a 62 y.o. female presents for evaluation of left lower quadrant abdominal pain that has been occurring 1-2 times a year for about 5 years. It has been occurring more consistently over the last month. She has been to the ED 4 times since the beginning of June. She has associated nausea, vomiting, and non-bloody diarrhea associated with the pain. Her last visit to the ED resulted in admission and transfer to [facility]. She started to have gelatinous yellow vaginal discharge two days into the admission, and her abdominal pain improved. Continues with stool and flatus per vagina. No urinary symptoms. Her last colonoscopy was about 2 years ago and had some polyps. She has a family history of two uncles and a sister with colon cancer.      Interval history:  Since last clinic visit, patient has had a repeat CT scan showing thickening of sigmoid colon but no definitive evidence of a fistula.  She reports ongoing fecal drainage from her vagina daily.  This is not associated with bowel movements or urination.  Has had a colonoscopy showing severe sigmoid narrowing/stenosis requiring pediatric scope used to pass but no polyps or lesions removed from the area.  She currently denies any fever, chills, nausea, vomiting.    Chief Complaint   Patient presents with    Follow-up     1 week follow up// c-scope 9/9       Review of patient's allergies indicates:  No Known Allergies    Current Outpatient Medications   Medication Sig Dispense Refill    benazepriL (LOTENSIN) 20 MG tablet Take 20 mg by mouth once daily.      bromfenac (PROLENSA) 0.07 % Drop Apply 1 drop to eye once daily. (both eyes)      COMBIGAN 0.2-0.5 % Drop Place 1 drop into both eyes 2 (two) times daily.      difluprednate (DUREZOL) 0.05 % Drop ophthalmic solution Place 1 drop into both eyes 4 (four) times daily.      hydroCHLOROthiazide (HYDRODIURIL) 25 MG tablet Take 25 mg by mouth once  daily.      latanoprost 0.005 % ophthalmic solution Place 1 drop into both eyes nightly.      metFORMIN (GLUCOPHAGE) 500 MG tablet Take 500 mg by mouth 2 (two) times daily.      ondansetron (ZOFRAN-ODT) 4 MG TbDL Dissolve 1 tablet (4 mg total) by mouth every 6 (six) hours as needed for nausea/vomiting 31 tablet 2    oxyCODONE (ROXICODONE) 10 mg Tab immediate release tablet Take 1 tablet (10 mg total) by mouth every 3 (three) hours as needed for Pain. 20 tablet 0    SYNTHROID 75 mcg tablet Take 75 mcg by mouth once daily.      HYDROcodone-acetaminophen (NORCO) 7.5-325 mg per tablet Take 1 tablet by mouth every 6 (six) hours as needed for Pain. (Patient not taking: Reported on 9/19/2022) 18 tablet 0    metroNIDAZOLE (FLAGYL) 500 MG tablet Take 1 tablet (500 mg total) by mouth 3 (three) times daily. Take initial dose@2pm, second dose@3pm and final dose@10pm day before surgery 3 tablet 0    neomycin (MYCIFRADIN) 500 mg Tab Take 2 tablets (1,000 mg total) by mouth 3 (three) times daily. Take 1st dose@2pm,take 2nd dose@3pm, take last dose@10pm day before surgery 6 tablet 0    polyethylene glycol (GLYCOLAX) 17 gram/dose powder Take 238 g by mouth once daily. Mix with 2L of gatorade, drink all mixed solution starting@12pm day before surgery, finish by 10pm 1 each 0     No current facility-administered medications for this visit.       Past Medical History:   Diagnosis Date    Diverticular disease of large intestine without perforation or abscess     HTN (hypertension)     Thyroid disease     Type 2 diabetes mellitus with unspecified diabetic retinopathy without macular edema     Unspecified glaucoma      Past Surgical History:   Procedure Laterality Date    COLONOSCOPY N/A 9/9/2022    Procedure: COLONOSCOPY;  Surgeon: Herman Cole MD;  Location: [facility];  Service: General;  Laterality: N/A;    EXTRACTION OF CATARACT Bilateral     HEMORRHOID SURGERY      HYSTERECTOMY      REDUCTION OF BOTH BREASTS       No family  history on file.  Social History     Tobacco Use    Smoking status: Never    Smokeless tobacco: Never   Substance Use Topics    Alcohol use: Never    Drug use: Never        Review of Systems:  Review of Systems   Constitutional:  Negative for activity change, appetite change, chills, fatigue, fever and unexpected weight change.   HENT:  Negative for congestion, ear pain, sore throat and trouble swallowing.    Eyes:  Negative for pain, redness and itching.   Respiratory:  Negative for cough, shortness of breath, wheezing and stridor.    Cardiovascular:  Negative for chest pain, palpitations and leg swelling.   Gastrointestinal:  Positive for abdominal pain, blood in stool and rectal pain. Negative for abdominal distention, anal bleeding, constipation, diarrhea, nausea and vomiting.   Endocrine: Negative for cold intolerance, heat intolerance and polyuria.   Genitourinary:  Positive for dysuria, frequency and vaginal discharge. Negative for difficulty urinating, flank pain, hematuria and urgency.   Musculoskeletal:  Negative for gait problem, joint swelling and neck pain.   Skin:  Negative for color change, pallor, rash and wound.   Allergic/Immunologic: Negative for environmental allergies and immunocompromised state.   Neurological:  Negative for dizziness, speech difficulty, weakness and numbness.   Hematological:  Does not bruise/bleed easily.   Psychiatric/Behavioral:  Negative for agitation, confusion and hallucinations.      OBJECTIVE:     Vital Signs (Most Recent)  Temp: 97.3 °F (36.3 °C) (09/19/22 0849)  Pulse: 82 (09/19/22 0849)  BP: (!) 147/100 (09/19/22 0849)     80.7 kg (177 lb 14.6 oz)     Physical Exam:  Physical Exam  Vitals reviewed.   Constitutional:       General: She is not in acute distress.     Appearance: Normal appearance. She is well-developed. She is not ill-appearing.   HENT:      Head: Normocephalic and atraumatic.      Right Ear: External ear normal.      Left Ear: External ear normal.    Eyes:      Extraocular Movements: Extraocular movements intact.      Conjunctiva/sclera: Conjunctivae normal.   Cardiovascular:      Rate and Rhythm: Normal rate.   Pulmonary:      Effort: Pulmonary effort is normal. No respiratory distress.   Abdominal:      Comments: Soft, non-distended. Mild LLQ TTP. Well-healed scars from lap cholecystectomy and Pfannenstiel incision   Musculoskeletal:      Cervical back: Neck supple.   Skin:     General: Skin is warm and dry.   Neurological:      Mental Status: She is alert and oriented to person, place, and time.   Psychiatric:         Behavior: Behavior normal.     Diagnostic Results:  All recent CT scans reviewed.     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  We discussed that this is likely from previous perforated diverticulitis and this will require surgical intervention to correct.  She is amenable to this.  - plan for robotic/lap vs open sigmoidectomy, possible vaginal cuff repair and any other indicated procedures in near future  - All risks, benefits and alternatives fully explained to patient. Risks include, but are not limited to, bleeding, infection, anastomotic leak, damage to ureter, damage to other intra-abdominal organs such as colon, rectum, small bowel, stomach, liver, bladder, reproductive organs, sexual dysfunction, urinary dysfunction, postoperative abscess, conversion to open operation, perioperative MI, CVA and death.  All questions field and appropriately answered to patient's satisfaction.  Consent signed and placed on chart.  - mechanical and oral abx bowel prep  - ERAS protocol      Herman Cole,  MD  Colon and Rectal Surgery  [facility]

## 2022-09-19 NOTE — PROGRESS NOTES
History & Physical    SUBJECTIVE:     History of Present Illness:  Patient is a 62 y.o. female presents for evaluation of left lower quadrant abdominal pain that has been occurring 1-2 times a year for about 5 years. It has been occurring more consistently over the last month. She has been to the ED 4 times since the beginning of June. She has associated nausea, vomiting, and non-bloody diarrhea associated with the pain. Her last visit to the ED resulted in admission and transfer to [facility]. She started to have gelatinous yellow vaginal discharge two days into the admission, and her abdominal pain improved. Continues with stool and flatus per vagina. No urinary symptoms. Her last colonoscopy was about 2 years ago and had some polyps. She has a family history of two uncles and a sister with colon cancer.      Interval history:  Since last clinic visit, patient has had a repeat CT scan showing thickening of sigmoid colon but no definitive evidence of a fistula.  She reports ongoing fecal drainage from her vagina daily.  This is not associated with bowel movements or urination.  Has had a colonoscopy showing severe sigmoid narrowing/stenosis requiring pediatric scope used to pass but no polyps or lesions removed from the area.  She currently denies any fever, chills, nausea, vomiting.    Chief Complaint   Patient presents with    Follow-up     1 week follow up// c-scope 9/9       Review of patient's allergies indicates:  No Known Allergies    Current Outpatient Medications   Medication Sig Dispense Refill    benazepriL (LOTENSIN) 20 MG tablet Take 20 mg by mouth once daily.      bromfenac (PROLENSA) 0.07 % Drop Apply 1 drop to eye once daily. (both eyes)      COMBIGAN 0.2-0.5 % Drop Place 1 drop into both eyes 2 (two) times daily.      difluprednate (DUREZOL) 0.05 % Drop ophthalmic solution Place 1 drop into both eyes 4 (four) times daily.      hydroCHLOROthiazide (HYDRODIURIL) 25 MG tablet Take 25 mg by mouth once  daily.      latanoprost 0.005 % ophthalmic solution Place 1 drop into both eyes nightly.      metFORMIN (GLUCOPHAGE) 500 MG tablet Take 500 mg by mouth 2 (two) times daily.      ondansetron (ZOFRAN-ODT) 4 MG TbDL Dissolve 1 tablet (4 mg total) by mouth every 6 (six) hours as needed for nausea/vomiting 31 tablet 2    oxyCODONE (ROXICODONE) 10 mg Tab immediate release tablet Take 1 tablet (10 mg total) by mouth every 3 (three) hours as needed for Pain. 20 tablet 0    SYNTHROID 75 mcg tablet Take 75 mcg by mouth once daily.      HYDROcodone-acetaminophen (NORCO) 7.5-325 mg per tablet Take 1 tablet by mouth every 6 (six) hours as needed for Pain. (Patient not taking: Reported on 9/19/2022) 18 tablet 0    metroNIDAZOLE (FLAGYL) 500 MG tablet Take 1 tablet (500 mg total) by mouth 3 (three) times daily. Take initial dose@2pm, second dose@3pm and final dose@10pm day before surgery 3 tablet 0    neomycin (MYCIFRADIN) 500 mg Tab Take 2 tablets (1,000 mg total) by mouth 3 (three) times daily. Take 1st dose@2pm,take 2nd dose@3pm, take last dose@10pm day before surgery 6 tablet 0    polyethylene glycol (GLYCOLAX) 17 gram/dose powder Take 238 g by mouth once daily. Mix with 2L of gatorade, drink all mixed solution starting@12pm day before surgery, finish by 10pm 1 each 0     No current facility-administered medications for this visit.       Past Medical History:   Diagnosis Date    Diverticular disease of large intestine without perforation or abscess     HTN (hypertension)     Thyroid disease     Type 2 diabetes mellitus with unspecified diabetic retinopathy without macular edema     Unspecified glaucoma      Past Surgical History:   Procedure Laterality Date    COLONOSCOPY N/A 9/9/2022    Procedure: COLONOSCOPY;  Surgeon: Herman Cole MD;  Location: [facility];  Service: General;  Laterality: N/A;    EXTRACTION OF CATARACT Bilateral     HEMORRHOID SURGERY      HYSTERECTOMY      REDUCTION OF BOTH BREASTS       No family  history on file.  Social History     Tobacco Use    Smoking status: Never    Smokeless tobacco: Never   Substance Use Topics    Alcohol use: Never    Drug use: Never        Review of Systems:  Review of Systems   Constitutional:  Negative for activity change, appetite change, chills, fatigue, fever and unexpected weight change.   HENT:  Negative for congestion, ear pain, sore throat and trouble swallowing.    Eyes:  Negative for pain, redness and itching.   Respiratory:  Negative for cough, shortness of breath, wheezing and stridor.    Cardiovascular:  Negative for chest pain, palpitations and leg swelling.   Gastrointestinal:  Positive for abdominal pain, blood in stool and rectal pain. Negative for abdominal distention, anal bleeding, constipation, diarrhea, nausea and vomiting.   Endocrine: Negative for cold intolerance, heat intolerance and polyuria.   Genitourinary:  Positive for dysuria, frequency and vaginal discharge. Negative for difficulty urinating, flank pain, hematuria and urgency.   Musculoskeletal:  Negative for gait problem, joint swelling and neck pain.   Skin:  Negative for color change, pallor, rash and wound.   Allergic/Immunologic: Negative for environmental allergies and immunocompromised state.   Neurological:  Negative for dizziness, speech difficulty, weakness and numbness.   Hematological:  Does not bruise/bleed easily.   Psychiatric/Behavioral:  Negative for agitation, confusion and hallucinations.      OBJECTIVE:     Vital Signs (Most Recent)  Temp: 97.3 °F (36.3 °C) (09/19/22 0849)  Pulse: 82 (09/19/22 0849)  BP: (!) 147/100 (09/19/22 0849)     80.7 kg (177 lb 14.6 oz)     Physical Exam:  Physical Exam  Vitals reviewed.   Constitutional:       General: She is not in acute distress.     Appearance: Normal appearance. She is well-developed. She is not ill-appearing.   HENT:      Head: Normocephalic and atraumatic.      Right Ear: External ear normal.      Left Ear: External ear normal.    Eyes:      Extraocular Movements: Extraocular movements intact.      Conjunctiva/sclera: Conjunctivae normal.   Cardiovascular:      Rate and Rhythm: Normal rate.   Pulmonary:      Effort: Pulmonary effort is normal. No respiratory distress.   Abdominal:      Comments: Soft, non-distended. Mild LLQ TTP. Well-healed scars from lap cholecystectomy and Pfannenstiel incision   Musculoskeletal:      Cervical back: Neck supple.   Skin:     General: Skin is warm and dry.   Neurological:      Mental Status: She is alert and oriented to person, place, and time.   Psychiatric:         Behavior: Behavior normal.     Diagnostic Results:  All recent CT scans reviewed.     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  We discussed that this is likely from previous perforated diverticulitis and this will require surgical intervention to correct.  She is amenable to this.  - plan for robotic/lap vs open sigmoidectomy, possible vaginal cuff repair and any other indicated procedures in near future  - All risks, benefits and alternatives fully explained to patient. Risks include, but are not limited to, bleeding, infection, anastomotic leak, damage to ureter, damage to other intra-abdominal organs such as colon, rectum, small bowel, stomach, liver, bladder, reproductive organs, sexual dysfunction, urinary dysfunction, postoperative abscess, conversion to open operation, perioperative MI, CVA and death.  All questions field and appropriately answered to patient's satisfaction.  Consent signed and placed on chart.  - mechanical and oral abx bowel prep  - ERAS protocol      Herman Cole,  MD  Colon and Rectal Surgery  [facility]

## 2022-09-23 ENCOUNTER — TELEPHONE (OUTPATIENT)
Dept: PREADMISSION TESTING | Facility: HOSPITAL | Age: 62
End: 2022-09-23
Payer: COMMERCIAL

## 2022-09-23 NOTE — TELEPHONE ENCOUNTER
Pre op instructions reviewed with pt per phone: Spoke about the following; verbalized understanding.    To confirm, Your Surgery is scheduled on 10/6/22. We will call you the business day (after 2:30 pm) prior to surgery to confirm arrival time as surgery schedule is subject to change due to cancellations/ emergencies.    >>>Address: [address] [address], [place].<<<  Please report to [facility] (1st Floor) located off of [facility] (2nd building on the left, in front of the flag pole).    INSTRUCTIONS IMPORTANT!!!  Nothing By Mouth after 12 midnight! NO WATER after midnight! OK to brush teeth, no gum, candy or mints!    May have light breakfast day before surgery, but ONLY CLEAR LIQUIDS starting at 12 Noon. NO SOLID FOOD AFTER STARTING BOWEL PREP!  Take antibiotics day before surgery. Start bowel prep day before surgery at 12pm, finish by 10pm.      metronidazole 500 mg Oral 3 times daily, Take initial dose@2pm, second dose@3pm and final dose@10pm day before surgery    neomycin sulfate 1,000 mg Oral 3 times daily, Take 1st dose@2pm,take 2nd dose@3pm, take last dose@10pm day before surgery    polyethylene glycol 3350 238 g Oral Daily, Mix with 2L of Gatorade, drink all mixed solution starting@12pm day before surgery, finish by 10pm         *Take only these medicines with a small swallow of water-morning of surgery.  Synthroid  Eye drops      ____  NO Acrylic/fake nails worn day of surgery (hand/arm surgeries)  ____  NO powder, lotions, deodorants or creams to surgical area.  ____  Please remove all jewelry, including piercings and leave at home.  ____  Dentures, Hearing Aids and Contact Lens will need to be removed prior to the start of surgery.  ____  Please bring photo ID and insurance information to hospital (Leave Valuables at Home)  ____  If going home the same day, arrange for a ride home. You will not be able to             drive for 24 hours after anesthesia.  ____  Wear clean, loose  fitting clothing. Allow for dressings, bandages.  ____  Stop all Aspirin/ Nsaid products, Ibuprofen, Motrin, Advil and Aleve at least 7 days before surgery, unless otherwise instructed by your doctor, or the nurse.   ____ Blood Thinners are stopped based on your Provider's recommendation; Patients need to call their Surgeon regarding when to stop/hold.  ____ Stop taking any Fish Oil /Supplements/ Vitamins at least 7 days prior to surgery, unless instructed otherwise by your Doctor.    Diabetic Patients: If you take Diabetic medication, do NOT take morning of surgery unless instructed by Doctor. Metformin to be stopped 24 hrs prior to surgery time. DO NOT take long-acting insulin the evening before surgery. Blood sugars will be checked in pre-op morning of.          Bathing Instructions:    -Do not shave your face or body the day before or the day of surgery!   -Shower & Rinse your body as usual with anti-bacterial Soap (Dial, Lever 2000, or Hibiclens) the night before surgery and the morning of surgery.               -Do not use Hibiclens on your face (Females should avoid genital area as well).   -Do not wash with anti-bacterial soap after you use the Hibiclens.   -Rinse & dry your body thoroughly. Apply clean clothes after shower.    Discharge Instructions: will be given to you by the Nurse discharging you home. Please call your Surgeon's office with any post-surgery questions/concerns/medications @ [phone].    [facility] Visitor/Ride Policy:  Only 2 adults allowed (over the age of 18) to accompany you into Surgery Dept. All other visitors will need to wait in waiting room. Must have a ride home from a responsible adult that you know and trust. Medical Transport, Uber or Lyft can only be used if patient has a responsible adult to accompany them during ride home.      >Call Surgeon office/on-call Surgeon if you experience any of these signs & symptoms of infection @ [phone].    *If you are running late for  surgery, please call Surgery dept at [phone].  *Insurance/Financial Questions, please call [phone]    Thank you,  -[facility] Nurse  [phone] or [phone]  Office Hours: M-F 7:30 am - 4 pm

## 2022-10-05 ENCOUNTER — TELEPHONE (OUTPATIENT)
Dept: SURGERY | Facility: CLINIC | Age: 62
End: 2022-10-05
Payer: COMMERCIAL

## 2022-10-05 ENCOUNTER — TELEPHONE (OUTPATIENT)
Dept: PREADMISSION TESTING | Facility: HOSPITAL | Age: 62
End: 2022-10-05
Payer: COMMERCIAL

## 2022-10-05 NOTE — TELEPHONE ENCOUNTER
Spoke with patient about all prep instructions, patient correctly repeated them back to me. Stated she had no further questions at this time.     ----- Message from Yuliya Henriquez MA sent at 10/5/2022 11:35 AM CDT -----  Hey pt called but you was in a room. Pt has surgery tomorrow with Dr. Cole and she had some questions about the liquid she has to drink. I wasn't sure how to answer her questions. If you would please give her a call back.

## 2022-10-06 ENCOUNTER — ANESTHESIA EVENT (OUTPATIENT)
Dept: SURGERY | Facility: HOSPITAL | Age: 62
DRG: 330 | End: 2022-10-06
Payer: COMMERCIAL

## 2022-10-06 ENCOUNTER — ANESTHESIA (OUTPATIENT)
Dept: SURGERY | Facility: HOSPITAL | Age: 62
DRG: 330 | End: 2022-10-06
Payer: COMMERCIAL

## 2022-10-06 ENCOUNTER — HOSPITAL ENCOUNTER (INPATIENT)
Facility: HOSPITAL | Age: 62
LOS: 2 days | Discharge: HOME | DRG: 330 | End: 2022-10-08
Attending: COLON & RECTAL SURGERY | Admitting: COLON & RECTAL SURGERY
Payer: COMMERCIAL

## 2022-10-06 DIAGNOSIS — K57.32 DIVERTICULITIS LARGE INTESTINE W/O PERFORATION OR ABSCESS W/O BLEEDING: ICD-10-CM

## 2022-10-06 DIAGNOSIS — N82.4 COLOVAGINAL FISTULA: ICD-10-CM

## 2022-10-06 LAB
POCT GLUCOSE: 106 MG/DL (ref 70–110)
POCT GLUCOSE: 143 MG/DL (ref 70–110)

## 2022-10-06 PROCEDURE — 63600175 PHARM REV CODE 636 W HCPCS: Performed by: NURSE ANESTHETIST, CERTIFIED REGISTERED

## 2022-10-06 PROCEDURE — 88342 CHG IMMUNOCYTOCHEMISTRY: ICD-10-PCS | Mod: 26,,, | Performed by: PATHOLOGY

## 2022-10-06 PROCEDURE — 37000008 HC ANESTHESIA 1ST 15 MINUTES: Performed by: COLON & RECTAL SURGERY

## 2022-10-06 PROCEDURE — 88307 TISSUE EXAM BY PATHOLOGIST: CPT | Performed by: PATHOLOGY

## 2022-10-06 PROCEDURE — 94761 N-INVAS EAR/PLS OXIMETRY MLT: CPT

## 2022-10-06 PROCEDURE — 27201423 OPTIME MED/SURG SUP & DEVICES STERILE SUPPLY: Performed by: COLON & RECTAL SURGERY

## 2022-10-06 PROCEDURE — 25000003 PHARM REV CODE 250: Performed by: NURSE ANESTHETIST, CERTIFIED REGISTERED

## 2022-10-06 PROCEDURE — 88305 TISSUE EXAM BY PATHOLOGIST: CPT | Mod: 26,,, | Performed by: PATHOLOGY

## 2022-10-06 PROCEDURE — 88307 TISSUE EXAM BY PATHOLOGIST: CPT | Mod: 26,,, | Performed by: PATHOLOGY

## 2022-10-06 PROCEDURE — 25000003 PHARM REV CODE 250: Performed by: COLON & RECTAL SURGERY

## 2022-10-06 PROCEDURE — 36000713 HC OR TIME LEV V EA ADD 15 MIN: Performed by: COLON & RECTAL SURGERY

## 2022-10-06 PROCEDURE — 44207 L COLECTOMY/COLOPROCTOSTOMY: CPT | Mod: AS,,,

## 2022-10-06 PROCEDURE — 58999 UNLISTED PX FML GENITAL SYS: CPT | Mod: ,,, | Performed by: OBSTETRICS & GYNECOLOGY

## 2022-10-06 PROCEDURE — 37000009 HC ANESTHESIA EA ADD 15 MINS: Performed by: COLON & RECTAL SURGERY

## 2022-10-06 PROCEDURE — 44213 LAP MOBIL SPLENIC FL ADD-ON: CPT | Mod: ,,, | Performed by: COLON & RECTAL SURGERY

## 2022-10-06 PROCEDURE — 44213 PR LAP, SURG MOBIL SPLENIC FL DUR PTL COLECTOMY: ICD-10-PCS | Mod: ,,, | Performed by: COLON & RECTAL SURGERY

## 2022-10-06 PROCEDURE — 88342 IMHCHEM/IMCYTCHM 1ST ANTB: CPT | Performed by: PATHOLOGY

## 2022-10-06 PROCEDURE — 44207 PR LAP,SURG,COLECTOMY,W/ANAST: ICD-10-PCS | Mod: AS,,,

## 2022-10-06 PROCEDURE — 63600175 PHARM REV CODE 636 W HCPCS: Performed by: COLON & RECTAL SURGERY

## 2022-10-06 PROCEDURE — 44207 PR LAP,SURG,COLECTOMY,W/ANAST: ICD-10-PCS | Mod: ,,, | Performed by: COLON & RECTAL SURGERY

## 2022-10-06 PROCEDURE — 63600175 PHARM REV CODE 636 W HCPCS: Performed by: STUDENT IN AN ORGANIZED HEALTH CARE EDUCATION/TRAINING PROGRAM

## 2022-10-06 PROCEDURE — 11000001 HC ACUTE MED/SURG PRIVATE ROOM

## 2022-10-06 PROCEDURE — 25000003 PHARM REV CODE 250: Performed by: STUDENT IN AN ORGANIZED HEALTH CARE EDUCATION/TRAINING PROGRAM

## 2022-10-06 PROCEDURE — 88305 TISSUE EXAM BY PATHOLOGIST: ICD-10-PCS | Mod: 26,,, | Performed by: PATHOLOGY

## 2022-10-06 PROCEDURE — S0030 INJECTION, METRONIDAZOLE: HCPCS | Performed by: COLON & RECTAL SURGERY

## 2022-10-06 PROCEDURE — 44207 L COLECTOMY/COLOPROCTOSTOMY: CPT | Mod: ,,, | Performed by: COLON & RECTAL SURGERY

## 2022-10-06 PROCEDURE — 71000039 HC RECOVERY, EACH ADD'L HOUR: Performed by: COLON & RECTAL SURGERY

## 2022-10-06 PROCEDURE — 58999 PR EXCISION OF GRANULATION TISSUE OF VAGINAL CUFF: ICD-10-PCS | Mod: ,,, | Performed by: OBSTETRICS & GYNECOLOGY

## 2022-10-06 PROCEDURE — C9290 INJ, BUPIVACAINE LIPOSOME: HCPCS | Performed by: COLON & RECTAL SURGERY

## 2022-10-06 PROCEDURE — 44213 LAP MOBIL SPLENIC FL ADD-ON: CPT | Mod: AS,,,

## 2022-10-06 PROCEDURE — 36000712 HC OR TIME LEV V 1ST 15 MIN: Performed by: COLON & RECTAL SURGERY

## 2022-10-06 PROCEDURE — 44213 PR LAP, SURG MOBIL SPLENIC FL DUR PTL COLECTOMY: ICD-10-PCS | Mod: AS,,,

## 2022-10-06 PROCEDURE — 71000033 HC RECOVERY, INTIAL HOUR: Performed by: COLON & RECTAL SURGERY

## 2022-10-06 PROCEDURE — 88307 PR  SURG PATH,LEVEL V: ICD-10-PCS | Mod: 26,,, | Performed by: PATHOLOGY

## 2022-10-06 PROCEDURE — 88305 TISSUE EXAM BY PATHOLOGIST: CPT | Mod: 59 | Performed by: PATHOLOGY

## 2022-10-06 PROCEDURE — 88342 IMHCHEM/IMCYTCHM 1ST ANTB: CPT | Mod: 26,,, | Performed by: PATHOLOGY

## 2022-10-06 RX ORDER — METRONIDAZOLE 500 MG/1
500 TABLET ORAL EVERY 8 HOURS
Status: CANCELLED | OUTPATIENT
Start: 2022-10-06 | End: 2022-10-11

## 2022-10-06 RX ORDER — OXYCODONE HYDROCHLORIDE 5 MG/1
10 TABLET ORAL EVERY 4 HOURS PRN
Status: DISCONTINUED | OUTPATIENT
Start: 2022-10-06 | End: 2022-10-08 | Stop reason: HOSPADM

## 2022-10-06 RX ORDER — BUPIVACAINE HYDROCHLORIDE 2.5 MG/ML
INJECTION, SOLUTION EPIDURAL; INFILTRATION; INTRACAUDAL
Status: DISCONTINUED | OUTPATIENT
Start: 2022-10-06 | End: 2022-10-06 | Stop reason: HOSPADM

## 2022-10-06 RX ORDER — METRONIDAZOLE 500 MG/100ML
500 INJECTION, SOLUTION INTRAVENOUS
Status: COMPLETED | OUTPATIENT
Start: 2022-10-06 | End: 2022-10-06

## 2022-10-06 RX ORDER — AMOXICILLIN 250 MG
1 CAPSULE ORAL 2 TIMES DAILY
Status: DISCONTINUED | OUTPATIENT
Start: 2022-10-06 | End: 2022-10-08 | Stop reason: HOSPADM

## 2022-10-06 RX ORDER — SODIUM CHLORIDE, SODIUM LACTATE, POTASSIUM CHLORIDE, CALCIUM CHLORIDE 600; 310; 30; 20 MG/100ML; MG/100ML; MG/100ML; MG/100ML
INJECTION, SOLUTION INTRAVENOUS CONTINUOUS
Status: DISCONTINUED | OUTPATIENT
Start: 2022-10-06 | End: 2022-10-07

## 2022-10-06 RX ORDER — MIDAZOLAM HYDROCHLORIDE 1 MG/ML
INJECTION, SOLUTION INTRAMUSCULAR; INTRAVENOUS
Status: DISCONTINUED | OUTPATIENT
Start: 2022-10-06 | End: 2022-10-06

## 2022-10-06 RX ORDER — ONDANSETRON 2 MG/ML
4 INJECTION INTRAMUSCULAR; INTRAVENOUS EVERY 12 HOURS PRN
Status: DISCONTINUED | OUTPATIENT
Start: 2022-10-06 | End: 2022-10-06 | Stop reason: HOSPADM

## 2022-10-06 RX ORDER — LEVOTHYROXINE SODIUM 75 UG/1
75 TABLET ORAL DAILY
Status: CANCELLED | OUTPATIENT
Start: 2022-10-07

## 2022-10-06 RX ORDER — GABAPENTIN 400 MG/1
800 CAPSULE ORAL
Status: DISCONTINUED | OUTPATIENT
Start: 2022-10-06 | End: 2022-10-06 | Stop reason: HOSPADM

## 2022-10-06 RX ORDER — ONDANSETRON 2 MG/ML
4 INJECTION INTRAMUSCULAR; INTRAVENOUS EVERY 6 HOURS PRN
Status: DISCONTINUED | OUTPATIENT
Start: 2022-10-06 | End: 2022-10-08 | Stop reason: HOSPADM

## 2022-10-06 RX ORDER — CIPROFLOXACIN 500 MG/1
500 TABLET ORAL EVERY 12 HOURS
Status: CANCELLED | OUTPATIENT
Start: 2022-10-06 | End: 2022-10-11

## 2022-10-06 RX ORDER — ACETAMINOPHEN 500 MG
1000 TABLET ORAL ONCE
Status: DISCONTINUED | OUTPATIENT
Start: 2022-10-06 | End: 2022-10-06 | Stop reason: HOSPADM

## 2022-10-06 RX ORDER — HYDROMORPHONE HYDROCHLORIDE 2 MG/ML
0.2 INJECTION, SOLUTION INTRAMUSCULAR; INTRAVENOUS; SUBCUTANEOUS EVERY 5 MIN PRN
Status: DISCONTINUED | OUTPATIENT
Start: 2022-10-06 | End: 2022-10-06 | Stop reason: HOSPADM

## 2022-10-06 RX ORDER — CELECOXIB 100 MG/1
400 CAPSULE ORAL
Status: DISCONTINUED | OUTPATIENT
Start: 2022-10-06 | End: 2022-10-06 | Stop reason: HOSPADM

## 2022-10-06 RX ORDER — MUPIROCIN 20 MG/G
OINTMENT TOPICAL 2 TIMES DAILY
Status: DISCONTINUED | OUTPATIENT
Start: 2022-10-06 | End: 2022-10-08 | Stop reason: HOSPADM

## 2022-10-06 RX ORDER — OXYCODONE HYDROCHLORIDE 5 MG/1
5 TABLET ORAL EVERY 4 HOURS PRN
Status: DISCONTINUED | OUTPATIENT
Start: 2022-10-06 | End: 2022-10-08 | Stop reason: HOSPADM

## 2022-10-06 RX ORDER — HEPARIN SODIUM 5000 [USP'U]/ML
5000 INJECTION, SOLUTION INTRAVENOUS; SUBCUTANEOUS
Status: DISCONTINUED | OUTPATIENT
Start: 2022-10-06 | End: 2022-10-06 | Stop reason: HOSPADM

## 2022-10-06 RX ORDER — ACETAMINOPHEN 10 MG/ML
1000 INJECTION, SOLUTION INTRAVENOUS EVERY 8 HOURS
Status: COMPLETED | OUTPATIENT
Start: 2022-10-06 | End: 2022-10-07

## 2022-10-06 RX ORDER — MORPHINE SULFATE 4 MG/ML
2 INJECTION, SOLUTION INTRAMUSCULAR; INTRAVENOUS
Status: CANCELLED | OUTPATIENT
Start: 2022-10-06

## 2022-10-06 RX ORDER — NEOSTIGMINE METHYLSULFATE 1 MG/ML
INJECTION, SOLUTION INTRAVENOUS
Status: DISCONTINUED | OUTPATIENT
Start: 2022-10-06 | End: 2022-10-06

## 2022-10-06 RX ORDER — FENTANYL CITRATE 50 UG/ML
INJECTION, SOLUTION INTRAMUSCULAR; INTRAVENOUS
Status: DISCONTINUED | OUTPATIENT
Start: 2022-10-06 | End: 2022-10-06

## 2022-10-06 RX ORDER — FAMOTIDINE 20 MG/1
20 TABLET, FILM COATED ORAL 2 TIMES DAILY
Status: DISCONTINUED | OUTPATIENT
Start: 2022-10-06 | End: 2022-10-08 | Stop reason: HOSPADM

## 2022-10-06 RX ORDER — EPHEDRINE SULFATE 50 MG/ML
INJECTION, SOLUTION INTRAVENOUS
Status: DISCONTINUED | OUTPATIENT
Start: 2022-10-06 | End: 2022-10-06

## 2022-10-06 RX ORDER — PROPOFOL 10 MG/ML
VIAL (ML) INTRAVENOUS
Status: DISCONTINUED | OUTPATIENT
Start: 2022-10-06 | End: 2022-10-06

## 2022-10-06 RX ORDER — DEXAMETHASONE SODIUM PHOSPHATE 4 MG/ML
INJECTION, SOLUTION INTRA-ARTICULAR; INTRALESIONAL; INTRAMUSCULAR; INTRAVENOUS; SOFT TISSUE
Status: DISCONTINUED | OUTPATIENT
Start: 2022-10-06 | End: 2022-10-06

## 2022-10-06 RX ORDER — CHLORHEXIDINE GLUCONATE ORAL RINSE 1.2 MG/ML
10 SOLUTION DENTAL 2 TIMES DAILY
Status: DISCONTINUED | OUTPATIENT
Start: 2022-10-06 | End: 2022-10-08 | Stop reason: HOSPADM

## 2022-10-06 RX ORDER — GABAPENTIN 300 MG/1
300 CAPSULE ORAL 3 TIMES DAILY
Status: CANCELLED | OUTPATIENT
Start: 2022-10-06

## 2022-10-06 RX ORDER — ONDANSETRON 2 MG/ML
INJECTION INTRAMUSCULAR; INTRAVENOUS
Status: DISCONTINUED | OUTPATIENT
Start: 2022-10-06 | End: 2022-10-06

## 2022-10-06 RX ORDER — OXYCODONE AND ACETAMINOPHEN 5; 325 MG/1; MG/1
1 TABLET ORAL
Status: DISCONTINUED | OUTPATIENT
Start: 2022-10-06 | End: 2022-10-06 | Stop reason: HOSPADM

## 2022-10-06 RX ORDER — ROCURONIUM BROMIDE 10 MG/ML
INJECTION, SOLUTION INTRAVENOUS
Status: DISCONTINUED | OUTPATIENT
Start: 2022-10-06 | End: 2022-10-06

## 2022-10-06 RX ORDER — ONDANSETRON 2 MG/ML
4 INJECTION INTRAMUSCULAR; INTRAVENOUS DAILY PRN
Status: DISCONTINUED | OUTPATIENT
Start: 2022-10-06 | End: 2022-10-06 | Stop reason: HOSPADM

## 2022-10-06 RX ORDER — DIPHENHYDRAMINE HYDROCHLORIDE 50 MG/ML
12.5 INJECTION INTRAMUSCULAR; INTRAVENOUS EVERY 6 HOURS PRN
Status: DISCONTINUED | OUTPATIENT
Start: 2022-10-06 | End: 2022-10-08 | Stop reason: HOSPADM

## 2022-10-06 RX ORDER — LIDOCAINE HYDROCHLORIDE 20 MG/ML
INJECTION INTRAVENOUS
Status: DISCONTINUED | OUTPATIENT
Start: 2022-10-06 | End: 2022-10-06

## 2022-10-06 RX ORDER — KETOROLAC TROMETHAMINE 30 MG/ML
15 INJECTION, SOLUTION INTRAMUSCULAR; INTRAVENOUS EVERY 8 HOURS PRN
Status: DISCONTINUED | OUTPATIENT
Start: 2022-10-06 | End: 2022-10-06 | Stop reason: HOSPADM

## 2022-10-06 RX ADMIN — SODIUM CHLORIDE, SODIUM LACTATE, POTASSIUM CHLORIDE, AND CALCIUM CHLORIDE: .6; .31; .03; .02 INJECTION, SOLUTION INTRAVENOUS at 02:10

## 2022-10-06 RX ADMIN — SODIUM CHLORIDE, SODIUM LACTATE, POTASSIUM CHLORIDE, AND CALCIUM CHLORIDE: 600; 310; 30; 20 INJECTION, SOLUTION INTRAVENOUS at 06:10

## 2022-10-06 RX ADMIN — ONDANSETRON 4 MG: 2 INJECTION, SOLUTION INTRAMUSCULAR; INTRAVENOUS at 03:10

## 2022-10-06 RX ADMIN — SODIUM CHLORIDE, SODIUM LACTATE, POTASSIUM CHLORIDE, AND CALCIUM CHLORIDE: .6; .31; .03; .02 INJECTION, SOLUTION INTRAVENOUS at 11:10

## 2022-10-06 RX ADMIN — EPHEDRINE SULFATE 20 MG: 50 INJECTION INTRAVENOUS at 11:10

## 2022-10-06 RX ADMIN — LIDOCAINE HYDROCHLORIDE 100 MG: 20 INJECTION, SOLUTION INTRAVENOUS at 10:10

## 2022-10-06 RX ADMIN — FAMOTIDINE 20 MG: 20 TABLET ORAL at 08:10

## 2022-10-06 RX ADMIN — PROPOFOL 120 MG: 10 INJECTION, EMULSION INTRAVENOUS at 10:10

## 2022-10-06 RX ADMIN — GLYCOPYRROLATE 0.8 MG: 0.2 INJECTION, SOLUTION INTRAMUSCULAR; INTRAVENOUS at 03:10

## 2022-10-06 RX ADMIN — DEXAMETHASONE SODIUM PHOSPHATE 8 MG: 4 INJECTION, SOLUTION INTRAMUSCULAR; INTRAVENOUS at 11:10

## 2022-10-06 RX ADMIN — MUPIROCIN: 20 OINTMENT TOPICAL at 08:10

## 2022-10-06 RX ADMIN — DOCUSATE SODIUM AND SENNOSIDES 1 TABLET: 8.6; 5 TABLET, FILM COATED ORAL at 08:10

## 2022-10-06 RX ADMIN — CHLORHEXIDINE GLUCONATE 0.12% ORAL RINSE 10 ML: 1.2 LIQUID ORAL at 08:10

## 2022-10-06 RX ADMIN — METRONIDAZOLE 500 MG: 500 SOLUTION INTRAVENOUS at 10:10

## 2022-10-06 RX ADMIN — CEFTRIAXONE SODIUM 2 G: 2 INJECTION, SOLUTION INTRAVENOUS at 10:10

## 2022-10-06 RX ADMIN — ROCURONIUM BROMIDE 20 MG: 10 INJECTION, SOLUTION INTRAVENOUS at 11:10

## 2022-10-06 RX ADMIN — EPHEDRINE SULFATE 10 MG: 50 INJECTION INTRAVENOUS at 12:10

## 2022-10-06 RX ADMIN — MIDAZOLAM 2 MG: 1 INJECTION INTRAMUSCULAR; INTRAVENOUS at 10:10

## 2022-10-06 RX ADMIN — ACETAMINOPHEN 1000 MG: 10 INJECTION INTRAVENOUS at 09:10

## 2022-10-06 RX ADMIN — ROCURONIUM BROMIDE 50 MG: 10 INJECTION, SOLUTION INTRAVENOUS at 10:10

## 2022-10-06 RX ADMIN — FENTANYL CITRATE 100 MCG: 50 INJECTION, SOLUTION INTRAMUSCULAR; INTRAVENOUS at 10:10

## 2022-10-06 RX ADMIN — NEOSTIGMINE METHYLSULFATE 5 MG: 1 INJECTION INTRAVENOUS at 03:10

## 2022-10-06 RX ADMIN — SODIUM CHLORIDE, SODIUM LACTATE, POTASSIUM CHLORIDE, AND CALCIUM CHLORIDE: .6; .31; .03; .02 INJECTION, SOLUTION INTRAVENOUS at 10:10

## 2022-10-06 RX ADMIN — SODIUM CHLORIDE, SODIUM LACTATE, POTASSIUM CHLORIDE, AND CALCIUM CHLORIDE: .6; .31; .03; .02 INJECTION, SOLUTION INTRAVENOUS at 01:10

## 2022-10-06 NOTE — TRANSFER OF CARE
"Anesthesia Transfer of Care Note    Patient: Marlyn Gary    Procedure(s) Performed: Procedure(s) (LRB):  XI ROBOTIC COLECTOMY, SIGMOID (N/A)  BLOCK, TRANSVERSUS ABDOMINIS PLANE (N/A)  SIGMOIDOSCOPY, FLEXIBLE (N/A)  EXAM UNDER ANESTHESIA, VAGINA (N/A)  REPAIR, VAGINAL CUFF (N/A)  XI ROBOTIC LYSIS, ADHESIONS    Patient location: PACU    Anesthesia Type: general    Transport from OR: Transported from OR on room air with adequate spontaneous ventilation    Post pain: adequate analgesia    Post assessment: no apparent anesthetic complications and tolerated procedure well    Post vital signs: stable    Level of consciousness: awake, responds to stimulation and alert    Nausea/Vomiting: no nausea/vomiting    Complications: none    Transfer of care protocol was followed      Last vitals:   Visit Vitals  BP (!) 146/79   Pulse 79   Temp 36 °C (96.8 °F) (Temporal)   Resp 12   Ht 5' 4.5" (1.638 m)   Wt 79.6 kg (175 lb 9.5 oz)   SpO2 100%   Breastfeeding No   BMI 29.68 kg/m²     "
17-May-2017

## 2022-10-06 NOTE — OP NOTE
Operative Note       SURGERY DATE:  10/6/2022     PRE-OP DIAGNOSIS:  Colovaginal fistula [N82.4]  Diverticulitis large intestine w/o perforation or abscess w/o bleeding [K57.32]    POST-OP DIAGNOSIS:  Colovaginal fistula [N82.4]  Diverticulitis large intestine w/o perforation or abscess w/o bleeding [K57.32]    Procedure(s) (LRB):  XI ROBOTIC COLECTOMY, SIGMOID (N/A)  BLOCK, TRANSVERSUS ABDOMINIS PLANE (N/A)  SIGMOIDOSCOPY, FLEXIBLE (N/A)  EXAM UNDER ANESTHESIA, VAGINA (N/A)  REPAIR, VAGINAL CUFF (N/A)  XI ROBOTIC LYSIS, ADHESIONS    Surgeon(s) and Role:  Panel 1:     * Herman Cole MD - Primary     * Bernadine Dial MD - Consult  Panel 2:     * Bernadine Dial MD - Primary    ASSISTANT:   Linda Angulo SCT,  and OSCAR Barnett    TASKS PERFORMED BY ASSISTANT:   retraction and exposure    ANESTHESIA: General    FINDINGS: see Dr. Cole's operative findings  Upon arrival for intra-op consultation, vaginal exam revealed a firm area of granulation tissue at the left lateral cuff with no identifiable cuff defect that we could identify robotically.  After robot was undocked, repeat vaginal exam with small area of granulation tissue at the left side of the cuff.  After this was removed, a small 5mm full thickness defect was noted in the vaginal cuff    GRAFTS/IMPLANTS:  None    ESTIMATED BLOOD LOSS: 5 mL              COMPLICATIONS:  None    SPECIMEN:   vaginal cuff granulation tissue    DESCRIPTION OF PROCEDURE:    Upon arrival to the OR, pt was in dorsal lithotomy position with robot docked.  Per Dr. Cole's request, vaginal exam under anesthesia was performed.  Digital examination of the cuff revealed the findings stated above.  The cuff was palpated vaginally while visualizing the cuff robotically.  No full thickness defect could be identified robotically.  After Dr. Cole completed his portion of the case, the robot was undocked, the patient was placed in high lithotomy position and Trendelenberg.  Weighted  speculum and retractors were placed in the vagina.  Vaginal cuff granulation tissue was removed and trimmed down to healthy tissue.  A 5mm full thickness vaginal cuff defect was then identified by passing a long Debakey tip through it.  This was repaired with 0 Vicryl interrupted stitch.  The cuff was then palpated digitally and reinspected with the Debakey tips, and no further defects identified.  All instruments were removed from the vagina.  See Dr. Cole's op note for all further details           CONDITION: Good    DISPOSITION: PACU - hemodynamically stable.  ass

## 2022-10-06 NOTE — ANESTHESIA POSTPROCEDURE EVALUATION
Anesthesia Post Evaluation    Patient: Marlyn Gary    Procedure(s) Performed: Procedure(s) (LRB):  XI ROBOTIC COLECTOMY, SIGMOID (N/A)  BLOCK, TRANSVERSUS ABDOMINIS PLANE (N/A)  SIGMOIDOSCOPY, FLEXIBLE (N/A)  EXAM UNDER ANESTHESIA, VAGINA (N/A)  REPAIR, VAGINAL CUFF (N/A)  XI ROBOTIC LYSIS, ADHESIONS    Final Anesthesia Type: general      Patient location during evaluation: PACU  Patient participation: Yes- Able to Participate  Level of consciousness: awake and alert  Post-procedure vital signs: reviewed and stable  Pain management: adequate  Airway patency: patent  NOEL mitigation strategies: Verification of full reversal of neuromuscular block  PONV status at discharge: No PONV  Anesthetic complications: no      Cardiovascular status: hemodynamically stable  Respiratory status: spontaneous ventilation  Hydration status: euvolemic  Follow-up not needed.          Vitals Value Taken Time   /87 10/06/22 1719   Temp 36 °C (96.8 °F) 10/06/22 1550   Pulse 79 10/06/22 1725   Resp 17 10/06/22 1725   SpO2 96 % 10/06/22 1725   Vitals shown include unvalidated device data.      No case tracking events are documented in the log.      Pain/Arvind Score: Arvind Score: 9 (10/6/2022  5:15 PM)

## 2022-10-06 NOTE — ANESTHESIA PROCEDURE NOTES
Intubation    Date/Time: 10/6/2022 10:56 AM  Performed by: Maco Lee CRNA  Authorized by: Ho Fraga II, MD     Intubation:     Induction:  Intravenous    Intubated:  Postinduction    Attempts:  1    Attempted By:  CRNA    Method of Intubation:  Direct    Blade:  Hubert 3    Laryngeal View Grade: Grade IIb - only the arytenoids and epiglottis seen      Difficult Airway Encountered?: No      Complications:  None    Airway Device:  Oral endotracheal tube    Airway Device Size:  8.0    Style/Cuff Inflation:  Cuffed (inflated to minimal occlusive pressure)    Inflation Amount (mL):  8    Tube secured:  20    Secured at:  The lips    Placement Verified By:  Capnometry    Complicating Factors:  None    Findings Post-Intubation:  BS equal bilateral

## 2022-10-06 NOTE — OP NOTE
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.4]  Diverticulitis large intestine w/o perforation or abscess w/o bleeding [K57.32]    Post-Operative Diagnosis: Post-Op Diagnosis Codes:     * Colovaginal fistula [N82.4]     * Diverticulitis large intestine w/o perforation or abscess w/o bleeding [K57.32]    Anesthesia: General    Technical Procedures Used:   Robotic low anterior resection  Robotic mobilization of splenic flexure  Robotic lysis of adhesions  Colovaginal fistula takedown  Flexible sigmoidoscopy  Transversus abdominis plane block    Indications for Procedure:  62-year-old female with previous perforated sigmoid diverticulitis with concern for colovaginal fistula presents for definitive surgical management    Findings of the Procedure:  Extremely thickened sigmoid colon with fistula to the left vaginal cuff amenable to fistula takedown, resection and primary anastomosis    Description of the Procedure:  Patient was brought to the operating room and placed supine on table.  General endotracheal anesthesia was then induced.  A Castaneda catheter was sterilely placed.  The patient was then moved to lithotomy position.  A rectal washout was performed.  The abdomen was then prepped and draped in the usual sterile fashion.  A preoperative surgical time-out was performed confirming the correct patient, procedure and preop medications given.  A left upper quadrant 8 mm incision was made beneath the subcostal margin and the Veress needle was inserted into abdominal cavity and  confirmed good position aspiration and saline drop test.  The abdomen was then insufflated to pressure 15 mmHg.  A an 8 mm robotic trocar was inserted this defect using Optiview technique.  The camera was introduced and there was no signs of injury upon entry.  There was noted to be adhesions of the omentum and the small bowel to the anterior abdominal wall.  A laparoscopic transversus abdominis plane block was then performed using a mixture of 20 cc Exparel, 30 cc of 0.25% bupivacaine plain and 10 cc of injectable saline.  12.5 cc was injected into the transversus abdominis plane in all 4 quadrants for a total of 50 cc given for the block.  The remaining 10 cc was injected at the end of the case at the port sites for local infiltration.  Three additional 8 mm trocars were inserted in the left upper quadrant to the right lower quadrant in usual fashion under direct visualization.  A right upper quadrant 5 mm AirSeal port was then placed.  The robot was then docked in usual fashion.    A robotic lysis of adhesion of the omentum and the small intestine was then undertaken from the lower midline.  This was done sharply.  There was no injury to the bowel during this process.  Once this was fully taken down, attention was turned to the pelvis.  There was a large dilated colon at the sigmoid level going into the pelvic inlet.  The vaginal cuff was not visible at this time.  The sigmoid colon filled the entirety of the pelvis in the distal rectum could not be seen.  Given the visualization of the pelvis, the decision was made to perform the splenic flexure mobilization 1st.  Attention was turned to the splenic flexure.  The lesser sac was entered above the omentum and transverse colon in usual fashion.  The splenic flexure was then mobilized by taking down the gastrocolic, splenocolic and phrenocolic ligaments.  The remaining small attachments of the splenic flexure and the mesentery to the retroperitoneal structures was  then taken down.  The dissection was then carried along the white line of Toldt all the way the descending colon to level of the sigmoid colon and the lateral attachments were sharply lysed to fully mobilize the splenic flexure and the left colon.    Attention was then turned to the sigmoid colon.  The sigmoid colon was found to be very adherent to the lateral abdominal sidewall, the lateral pelvic inlet and eventually the vaginal cuff.  This was both bluntly and sharply taken down with care taken to identify the left ureter and protected throughout the entirety of the dissection process.  The sigmoid colon was taken off of these attachments and was found to be granulation tissue along the left lateral vaginal cuff where the suspected fistula was present at the distal sigmoid colon.  The sigmoid colon was found to be very inflamed and enlarged in this area going into the proximal rectum.  Therefore the decision was made to resect part of the upper rectum to get to normal healthy rectum which was finally visible once the sigmoid colon was peeled off of its attachments.    With the sigmoid colon fully mobilized, the VINAY pedicle was evaluated and the mesentery was opened medially.  A mediolateral dissection of the mesentery was undertaken the left ureter was identified again and protected.  The VINAY was then ligated via high ligation using the robotic vessel sealer.  Once this was taken, the dissection was carried down to the retrorectal plane to a point on the mid rectum where it was more normal caliber.  A mesenteric window was made beneath the rectum at this level just at the peritoneal reflection.  The vessel sealer was used to take the mesorectum at this location.  There was no further attachments of the rectum to the vagina that were apparent.  The rectum was then divided using the vessel sealer at this location.  The mesentery of the sigmoid descending junction was evaluated and a mesenteric window was made  beneath the colon at this location.  The mesentery was then taken at this location all the way to the VINAY pedicle which had been previously taken using the vessel sealer.  The vessel sealer was then used to divide the descending sigmoid junction as expected where there was normal caliber colon.  The specimen was then placed in the right lower quadrant for future extraction.  The mesentery was divided lengthwise to assist with extraction to the anus.    A wound protector was then placed through the anus to the rectal cuff.  A laparoscopic grasper was then passed through the anus up to the rectal stump and the specimen was grasped and extracted through the anus.  It was extracted intact and sent for final pathology.  The wound protector was then removed.    The 29 mm EEA stapler was then brought through the anus and the anvil was brought out in the open rectal cuff and taken off of the stapler.  The anvil was then sewn into the cut end of the descending colon using a 3-0 V lock suture a pursestring fashion.  An additional 3-0 V lock suture was used in a pursestring fashion along the opening of the rectum and was cinched down around the open spike of the stapler.  The anvil and stapler were then  together and the stapler was fired and closed in usual fashion.  Care was taken to ensure there was no incorporation of the vagina or any surrounding structures in the stapler.  Once the stapler was fired it was withdrawn and there were 2 intact anastomotic donuts which were sent for final pathology.  The proximal descending colon was then manually clamped.  The pelvis was filled with irrigation.  A flexible sigmoidoscope was then inserted into the anus and the colon and rectum were insufflated.  There was a negative air leak test robotically.  The anastomosis was widely patent and hemostatic upon endoscopic viewing.  The colon and rectum were then desufflated.    The case was then turned over to [facility] at this point for  an intraoperative consult due to the defect in the left lateral vaginal cuff.  Please see their separately dictated operative report for full details.    Once the case was turned back over to us, the abdomen checked and found to be hemostatic.  The omentum was placed over the abdominal contents and into the pelvis to separate the anastomosis from the vaginal cuff and the vaginal repair.  All ports were then removed and the abdomen was then desufflated.  Local infiltration was completed.  All sites were copiously irrigated made hemostatic.  All sites were then closed with 4-0 Monocryl suture.  Skin glue was then applied.  The patient was moved back in the supine position.  She was woken from general endotracheal anesthesia and taken to the postanesthesia care in stable condition.  She tolerated procedure well.  All sponge, needle and instrument counts were correct at the end of the case.    Significant Surgical Tasks Conducted by the Assistant(s), if Applicable: Assisted with the entire procedure    Complications: No    Estimated Blood Loss (EBL): 150mL           Implants: * No implants in log *    Specimens:   Specimen (24h ago, onward)      None                    Condition: Good    Disposition: PACU - hemodynamically stable.    Attestation: I performed the procedure.

## 2022-10-06 NOTE — ANESTHESIA PREPROCEDURE EVALUATION
10/06/2022  Marlyn Gary is a 62 y.o., female.    61 y/o female with perforated sigmoid diverticulitis complicated by likely colovaginal fistula and sigmoid stenosis    Pre-op Assessment    I have reviewed the Patient Summary Reports.    I have reviewed the NPO Status.   I have reviewed the Medications.     Review of Systems  Anesthesia Hx:  No problems with previous Anesthesia    Social:  Non-Smoker    Hematology/Oncology:  Hematology Normal   Oncology Normal     EENT/Dental:EENT/Dental Normal   Cardiovascular:   Hypertension ECG has been reviewed.    Pulmonary:  Pulmonary Normal    Renal/:  Renal/ Normal     Hepatic/GI:  Hepatic/GI Normal Diverticulitis large intestine    Fistula.   Musculoskeletal:  Musculoskeletal Normal    Neurological:  Neurology Normal    Endocrine:   Diabetes Hypothyroidism        Physical Exam  General: Well nourished    Airway:  Mallampati: II   Mouth Opening: Normal  TM Distance: Normal      Dental:  Intact, Caps / Implants  Caps/Implants      Anesthesia Plan  Type of Anesthesia, risks & benefits discussed:    Anesthesia Type: Gen ETT  Intra-op Monitoring Plan: Standard ASA Monitors  Post Op Pain Control Plan: multimodal analgesia  Airway Plan: Direct  Informed Consent: Informed consent signed with the Patient and all parties understand the risks and agree with anesthesia plan.  All questions answered. Patient consented to blood products? Yes  ASA Score: 2    Ready For Surgery From Anesthesia Perspective.     .      Chemistry        Component Value Date/Time     09/19/2022 1000    K 4.1 09/19/2022 1000    CL 97 09/19/2022 1000    CO2 30 (H) 09/19/2022 1000    BUN 13 09/19/2022 1000    CREATININE 0.7 09/19/2022 1000     (H) 09/19/2022 1000        Component Value Date/Time    CALCIUM 9.8 09/19/2022 1000    ALKPHOS 71 09/19/2022 1000    AST 16 09/19/2022 1000     ALT 19 09/19/2022 1000    BILITOT 0.6 09/19/2022 1000    ESTGFRAFRICA >60.0 07/13/2022 0438    EGFRNONAA >60.0 07/13/2022 0438        Lab Results   Component Value Date    WBC 9.57 09/19/2022    HGB 13.7 09/19/2022    HCT 41.2 09/19/2022    MCV 88 09/19/2022     09/19/2022

## 2022-10-07 PROBLEM — N82.4 COLOVAGINAL FISTULA: Status: ACTIVE | Noted: 2022-10-07

## 2022-10-07 PROCEDURE — 99024 POSTOP FOLLOW-UP VISIT: CPT | Mod: ,,, | Performed by: COLON & RECTAL SURGERY

## 2022-10-07 PROCEDURE — 94761 N-INVAS EAR/PLS OXIMETRY MLT: CPT

## 2022-10-07 PROCEDURE — 63600175 PHARM REV CODE 636 W HCPCS: Performed by: COLON & RECTAL SURGERY

## 2022-10-07 PROCEDURE — 25000003 PHARM REV CODE 250: Performed by: COLON & RECTAL SURGERY

## 2022-10-07 PROCEDURE — 99024 PR POST-OP FOLLOW-UP VISIT: ICD-10-PCS | Mod: ,,, | Performed by: COLON & RECTAL SURGERY

## 2022-10-07 PROCEDURE — 11000001 HC ACUTE MED/SURG PRIVATE ROOM

## 2022-10-07 RX ORDER — ACETAMINOPHEN 325 MG/1
650 TABLET ORAL EVERY 6 HOURS
Status: DISCONTINUED | OUTPATIENT
Start: 2022-10-07 | End: 2022-10-08 | Stop reason: HOSPADM

## 2022-10-07 RX ORDER — GABAPENTIN 300 MG/1
300 CAPSULE ORAL 3 TIMES DAILY
Status: DISCONTINUED | OUTPATIENT
Start: 2022-10-07 | End: 2022-10-08 | Stop reason: HOSPADM

## 2022-10-07 RX ADMIN — OXYCODONE HYDROCHLORIDE 10 MG: 5 TABLET ORAL at 12:10

## 2022-10-07 RX ADMIN — CHLORHEXIDINE GLUCONATE 0.12% ORAL RINSE 10 ML: 1.2 LIQUID ORAL at 10:10

## 2022-10-07 RX ADMIN — DOCUSATE SODIUM AND SENNOSIDES 1 TABLET: 8.6; 5 TABLET, FILM COATED ORAL at 10:10

## 2022-10-07 RX ADMIN — CHLORHEXIDINE GLUCONATE 0.12% ORAL RINSE 10 ML: 1.2 LIQUID ORAL at 08:10

## 2022-10-07 RX ADMIN — MUPIROCIN: 20 OINTMENT TOPICAL at 08:10

## 2022-10-07 RX ADMIN — GABAPENTIN 300 MG: 300 CAPSULE ORAL at 08:10

## 2022-10-07 RX ADMIN — FAMOTIDINE 20 MG: 20 TABLET ORAL at 10:10

## 2022-10-07 RX ADMIN — DOCUSATE SODIUM AND SENNOSIDES 1 TABLET: 8.6; 5 TABLET, FILM COATED ORAL at 08:10

## 2022-10-07 RX ADMIN — ACETAMINOPHEN 1000 MG: 10 INJECTION INTRAVENOUS at 01:10

## 2022-10-07 RX ADMIN — FAMOTIDINE 20 MG: 20 TABLET ORAL at 08:10

## 2022-10-07 RX ADMIN — ACETAMINOPHEN 1000 MG: 10 INJECTION INTRAVENOUS at 06:10

## 2022-10-07 NOTE — PLAN OF CARE
[facility] - Med Surg  Initial Discharge Assessment       Primary Care Provider: RAY Rick    Admission Diagnosis: Colovaginal fistula [N82.4]  Diverticulitis large intestine w/o perforation or abscess w/o bleeding [K57.32]    Admission Date: 10/6/2022  Expected Discharge Date:     Discharge Barriers Identified: None    Payor: BLUE CROSS BLUE SHIELD / Plan: [facility] [place] [facility] / Product Type: PPO /     Extended Emergency Contact Information  Primary Emergency Contact: Jaron Gary  Mobile Phone: [phone]  Relation: Spouse  Preferred language: English   needed? No    Discharge Plan A: Home  Discharge Plan B: Home      [id number] - [place], [place] - [address] AT [facility] [address]  [address] [address]  Phone: [phone] Fax: [phone]    [facility] - [place], [place] - [address]  [address] [address]  Phone: [phone] Fax: [phone]      Initial Assessment (most recent)       Adult Discharge Assessment - 10/07/22 1023          Discharge Assessment    Assessment Type Discharge Planning Assessment     Confirmed/corrected address, phone number and insurance Yes     Confirmed Demographics Correct on Facesheet     Source of Information patient     Communicated RIGOBERTO with patient/caregiver Date not available/Unable to determine     Reason For Admission colovaginal fistula     Lives With spouse     Facility Arrived From: home     Do you have help at home or someone to help you manage your care at home? No     Prior to hospitilization cognitive status: Alert/Oriented     Current cognitive status: Alert/Oriented     Walking or Climbing Stairs Difficulty none     Dressing/Bathing Difficulty none     Equipment Currently Used at Home none     Readmission within 30 days? No     Patient currently being followed by outpatient case management? No     Do you currently  have service(s) that help you manage your care at home? No     Do you take prescription medications? Yes     Do you have prescription coverage? Yes     Coverage BCBS     Do you have any problems affording any of your prescribed medications? No     Is the patient taking medications as prescribed? yes     Who is going to help you get home at discharge? spouse     How do you get to doctors appointments? car, drives self;family or friend will provide     Are you on dialysis? No     Do you take coumadin? No     Discharge Plan A Home     Discharge Plan B Home     DME Needed Upon Discharge  none     Discharge Plan discussed with: Patient     Discharge Barriers Identified None        Physical Activity    On average, how many days per week do you engage in moderate to strenuous exercise (like a brisk walk)? 0 days     On average, how many minutes do you engage in exercise at this level? 0 min        Financial Resource Strain    How hard is it for you to pay for the very basics like food, housing, medical care, and heating? Not hard at all        Housing Stability    In the last 12 months, was there a time when you were not able to pay the mortgage or rent on time? No     In the last 12 months, how many places have you lived? 1     In the last 12 months, was there a time when you did not have a steady place to sleep or slept in a shelter (including now)? No        Transportation Needs    In the past 12 months, has lack of transportation kept you from medical appointments or from getting medications? No     In the past 12 months, has lack of transportation kept you from meetings, work, or from getting things needed for daily living? No        Food Insecurity    Within the past 12 months, you worried that your food would run out before you got the money to buy more. Never true     Within the past 12 months, the food you bought just didn't last and you didn't have money to get more. Never true        Stress    Do you feel stress  - tense, restless, nervous, or anxious, or unable to sleep at night because your mind is troubled all the time - these days? Not at all        Social Connections    In a typical week, how many times do you talk on the phone with family, friends, or neighbors? More than three times a week     How often do you get together with friends or relatives? Once a week     How often do you attend Shinto or Confucianism services? More than 4 times per year     Do you belong to any clubs or organizations such as Shinto groups, unions, fraMinekey or athletic groups, or school groups? Yes     How often do you attend meetings of the clubs or organizations you belong to? More than 4 times per year     Are you , , , , never , or living with a partner?         Alcohol Use    Q1: How often do you have a drink containing alcohol? Never     Q2: How many drinks containing alcohol do you have on a typical day when you are drinking? Patient does not drink     Q3: How often do you have six or more drinks on one occasion? Never                   Initial assessment completed with patient. Patient reports independence with ADL's  prior to hospitalization. Patient uses no DME at home. Patient currently has no cm needs upon DC. CM will continue following to assist with other needs.   CM provided information on advanced directives, information on pharmacy bedside delivery, and discharge planning begins on admission with contact information for any needs/questions.

## 2022-10-07 NOTE — HOSPITAL COURSE
10/06/2022: Underwent robotic sigmoidectomy, colovaginal fistula takedown and repair    10/07/2022: POD 1. Tolerating diet well. No nausea or vomiting. Pain well controlled. No BM/flatus yet.    10/8/2022 POD 2. Tolerating diet. Passing stool and flatus. Reports no pain. Ambulating and urinating without issues. D/c home today.

## 2022-10-07 NOTE — ASSESSMENT & PLAN NOTE
63yo F with previous perforated sigmoid diverticulitis and colovaginal fistula who is s/p robotic fistula takedown and sigmoidectomy    - cont reg diet  - HLIV  - OOB, ambulation  - IS 10xs/hr while awake  - PO pain control  - await full return of bowel function

## 2022-10-07 NOTE — PROGRESS NOTES
[facility]  Col[facility] Surgery  Progress Note    Subjective:     History of Present Illness:  61yo F with previous perforated sigmoid diverticulitis with likely resultant colovaginal fistula who presents for definitive surgical management      Post-Op Info:  Procedure(s) (LRB):  XI ROBOTIC COLECTOMY, SIGMOID (N/A)  BLOCK, TRANSVERSUS ABDOMINIS PLANE (N/A)  SIGMOIDOSCOPY, FLEXIBLE (N/A)  EXAM UNDER ANESTHESIA, VAGINA (N/A)  REPAIR, VAGINAL CUFF (N/A)  XI ROBOTIC LYSIS, ADHESIONS  MOBILIZATION, SPLENIC FLEXURE (N/A)   1 Day Post-Op     Interval History: Tolerating diet well. No nausea or vomiting. Pain well controlled. No BM/flatus yet.    Medications:  Continuous Infusions:   lactated ringers      lactated ringers 75 mL/hr at 10/06/22 1800     Scheduled Meds:   chlorhexidine  10 mL Mouth/Throat BID    famotidine  20 mg Oral BID    mupirocin   Nasal BID    senna-docusate 8.6-50 mg  1 tablet Oral BID     PRN Meds:diphenhydrAMINE, ondansetron, oxyCODONE, oxyCODONE     Review of patient's allergies indicates:  No Known Allergies  Objective:     Vital Signs (Most Recent):  Temp: 97.9 °F (36.6 °C) (10/07/22 1505)  Pulse: 81 (10/07/22 1505)  Resp: 18 (10/07/22 1505)  BP: (!) 118/55 (10/07/22 1505)  SpO2: 97 % (10/07/22 1505)   Vital Signs (24h Range):  Temp:  [96.8 °F (36 °C)-99.2 °F (37.3 °C)] 97.9 °F (36.6 °C)  Pulse:  [71-86] 81  Resp:  [12-18] 18  SpO2:  [83 %-100 %] 97 %  BP: (111-174)/() 118/55     Weight: 79.6 kg (175 lb 9.5 oz)  Body mass index is 29.68 kg/m².    Intake/Output - Last 3 Shifts         10/05 0700  10/06 0659 10/06 0700  10/07 0659 10/07 0700  10/08 0659    P.O.  0 360    IV Piggyback  3000     Total Intake(mL/kg)  3000 (37.7) 360 (4.5)    Urine (mL/kg/hr)  175     Blood  150     Total Output  325     Net  +2675 +360           Stool Occurrence  1 x             Physical Exam  Constitutional:       Appearance: She is well-developed.   HENT:      Head: Normocephalic and atraumatic.    Eyes:      Conjunctiva/sclera: Conjunctivae normal.   Neck:      Thyroid: No thyromegaly.   Cardiovascular:      Rate and Rhythm: Normal rate and regular rhythm.   Pulmonary:      Effort: Pulmonary effort is normal. No respiratory distress.   Abdominal:      Comments: Soft, nondistended, appropriately TTP; incisions c/d/I without erythema or drainage   Musculoskeletal:         General: No tenderness. Normal range of motion.      Cervical back: Normal range of motion.   Skin:     General: Skin is warm and dry.      Capillary Refill: Capillary refill takes less than 2 seconds.      Findings: No rash.   Neurological:      Mental Status: She is alert and oriented to person, place, and time.       Assessment/Plan:     Colovaginal fistula  63yo F with previous perforated sigmoid diverticulitis and colovaginal fistula who is s/p robotic fistula takedown and sigmoidectomy    - cont reg diet  - HLIV  - OOB, ambulation  - IS 10xs/hr while awake  - PO pain control  - await full return of bowel function        Herman Cole MD  Colorectal Surgery  [facility]

## 2022-10-07 NOTE — SUBJECTIVE & OBJECTIVE
Interval History: Tolerating diet well. No nausea or vomiting. Pain well controlled. No BM/flatus yet.    Medications:  Continuous Infusions:   lactated ringers      lactated ringers 75 mL/hr at 10/06/22 1800     Scheduled Meds:   chlorhexidine  10 mL Mouth/Throat BID    famotidine  20 mg Oral BID    mupirocin   Nasal BID    senna-docusate 8.6-50 mg  1 tablet Oral BID     PRN Meds:diphenhydrAMINE, ondansetron, oxyCODONE, oxyCODONE     Review of patient's allergies indicates:  No Known Allergies  Objective:     Vital Signs (Most Recent):  Temp: 97.9 °F (36.6 °C) (10/07/22 1505)  Pulse: 81 (10/07/22 1505)  Resp: 18 (10/07/22 1505)  BP: (!) 118/55 (10/07/22 1505)  SpO2: 97 % (10/07/22 1505)   Vital Signs (24h Range):  Temp:  [96.8 °F (36 °C)-99.2 °F (37.3 °C)] 97.9 °F (36.6 °C)  Pulse:  [71-86] 81  Resp:  [12-18] 18  SpO2:  [83 %-100 %] 97 %  BP: (111-174)/() 118/55     Weight: 79.6 kg (175 lb 9.5 oz)  Body mass index is 29.68 kg/m².    Intake/Output - Last 3 Shifts         10/05 0700  10/06 0659 10/06 0700  10/07 0659 10/07 0700  10/08 0659    P.O.  0 360    IV Piggyback  3000     Total Intake(mL/kg)  3000 (37.7) 360 (4.5)    Urine (mL/kg/hr)  175     Blood  150     Total Output  325     Net  +2675 +360           Stool Occurrence  1 x             Physical Exam  Constitutional:       Appearance: She is well-developed.   HENT:      Head: Normocephalic and atraumatic.   Eyes:      Conjunctiva/sclera: Conjunctivae normal.   Neck:      Thyroid: No thyromegaly.   Cardiovascular:      Rate and Rhythm: Normal rate and regular rhythm.   Pulmonary:      Effort: Pulmonary effort is normal. No respiratory distress.   Abdominal:      Comments: Soft, nondistended, appropriately TTP; incisions c/d/I without erythema or drainage   Musculoskeletal:         General: No tenderness. Normal range of motion.      Cervical back: Normal range of motion.   Skin:     General: Skin is warm and dry.      Capillary Refill: Capillary  refill takes less than 2 seconds.      Findings: No rash.   Neurological:      Mental Status: She is alert and oriented to person, place, and time.

## 2022-10-07 NOTE — HPI
63yo F with previous perforated sigmoid diverticulitis with likely resultant colovaginal fistula who presents for definitive surgical management

## 2022-10-08 VITALS
BODY MASS INDEX: 31.07 KG/M2 | HEART RATE: 74 BPM | RESPIRATION RATE: 18 BRPM | DIASTOLIC BLOOD PRESSURE: 58 MMHG | OXYGEN SATURATION: 97 % | WEIGHT: 186.5 LBS | HEIGHT: 65 IN | TEMPERATURE: 98 F | SYSTOLIC BLOOD PRESSURE: 104 MMHG

## 2022-10-08 LAB
ANION GAP SERPL CALC-SCNC: 9 MMOL/L (ref 8–16)
BASOPHILS # BLD AUTO: 0.08 K/UL (ref 0–0.2)
BASOPHILS NFR BLD: 0.5 % (ref 0–1.9)
BUN SERPL-MCNC: 10 MG/DL (ref 8–23)
CALCIUM SERPL-MCNC: 8.7 MG/DL (ref 8.7–10.5)
CHLORIDE SERPL-SCNC: 104 MMOL/L (ref 95–110)
CO2 SERPL-SCNC: 27 MMOL/L (ref 23–29)
CREAT SERPL-MCNC: 0.8 MG/DL (ref 0.5–1.4)
DIFFERENTIAL METHOD: ABNORMAL
EOSINOPHIL # BLD AUTO: 0.1 K/UL (ref 0–0.5)
EOSINOPHIL NFR BLD: 0.4 % (ref 0–8)
ERYTHROCYTE [DISTWIDTH] IN BLOOD BY AUTOMATED COUNT: 14.3 % (ref 11.5–14.5)
EST. GFR  (NO RACE VARIABLE): >60 ML/MIN/1.73 M^2
GLUCOSE SERPL-MCNC: 108 MG/DL (ref 70–110)
HCT VFR BLD AUTO: 34.6 % (ref 37–48.5)
HGB BLD-MCNC: 10.9 G/DL (ref 12–16)
IMM GRANULOCYTES # BLD AUTO: 0.05 K/UL (ref 0–0.04)
IMM GRANULOCYTES NFR BLD AUTO: 0.3 % (ref 0–0.5)
LYMPHOCYTES # BLD AUTO: 4.8 K/UL (ref 1–4.8)
LYMPHOCYTES NFR BLD: 32.6 % (ref 18–48)
MCH RBC QN AUTO: 27.8 PG (ref 27–31)
MCHC RBC AUTO-ENTMCNC: 31.5 G/DL (ref 32–36)
MCV RBC AUTO: 88 FL (ref 82–98)
MONOCYTES # BLD AUTO: 1 K/UL (ref 0.3–1)
MONOCYTES NFR BLD: 6.9 % (ref 4–15)
NEUTROPHILS # BLD AUTO: 8.7 K/UL (ref 1.8–7.7)
NEUTROPHILS NFR BLD: 59.3 % (ref 38–73)
NRBC BLD-RTO: 0 /100 WBC
PLATELET # BLD AUTO: 352 K/UL (ref 150–450)
PMV BLD AUTO: 9.3 FL (ref 9.2–12.9)
POTASSIUM SERPL-SCNC: 3.6 MMOL/L (ref 3.5–5.1)
RBC # BLD AUTO: 3.92 M/UL (ref 4–5.4)
SODIUM SERPL-SCNC: 140 MMOL/L (ref 136–145)
WBC # BLD AUTO: 14.75 K/UL (ref 3.9–12.7)

## 2022-10-08 PROCEDURE — 94761 N-INVAS EAR/PLS OXIMETRY MLT: CPT

## 2022-10-08 PROCEDURE — 25000003 PHARM REV CODE 250: Performed by: COLON & RECTAL SURGERY

## 2022-10-08 PROCEDURE — 85025 COMPLETE CBC W/AUTO DIFF WBC: CPT | Performed by: COLON & RECTAL SURGERY

## 2022-10-08 PROCEDURE — 25000003 PHARM REV CODE 250: Performed by: SURGERY

## 2022-10-08 PROCEDURE — 36415 COLL VENOUS BLD VENIPUNCTURE: CPT | Performed by: COLON & RECTAL SURGERY

## 2022-10-08 PROCEDURE — 80048 BASIC METABOLIC PNL TOTAL CA: CPT | Performed by: COLON & RECTAL SURGERY

## 2022-10-08 RX ORDER — HYDROCODONE BITARTRATE AND ACETAMINOPHEN 7.5; 325 MG/1; MG/1
1 TABLET ORAL EVERY 6 HOURS PRN
Qty: 25 TABLET | Refills: 0 | Status: SHIPPED | OUTPATIENT
Start: 2022-10-08

## 2022-10-08 RX ORDER — DOCUSATE SODIUM 100 MG/1
100 CAPSULE, LIQUID FILLED ORAL 2 TIMES DAILY
Qty: 60 CAPSULE | Refills: 1 | Status: SHIPPED | OUTPATIENT
Start: 2022-10-08

## 2022-10-08 RX ORDER — PROMETHAZINE HYDROCHLORIDE 25 MG/1
25 TABLET ORAL EVERY 4 HOURS PRN
Qty: 15 TABLET | Refills: 0 | Status: SHIPPED | OUTPATIENT
Start: 2022-10-08

## 2022-10-08 RX ORDER — METHOCARBAMOL 750 MG/1
750 TABLET, FILM COATED ORAL EVERY 8 HOURS PRN
Qty: 21 TABLET | Refills: 1 | Status: SHIPPED | OUTPATIENT
Start: 2022-10-08

## 2022-10-08 RX ORDER — AMOXICILLIN AND CLAVULANATE POTASSIUM 875; 125 MG/1; MG/1
1 TABLET, FILM COATED ORAL EVERY 12 HOURS
Status: DISCONTINUED | OUTPATIENT
Start: 2022-10-08 | End: 2022-10-08 | Stop reason: HOSPADM

## 2022-10-08 RX ORDER — AMOXICILLIN AND CLAVULANATE POTASSIUM 875; 125 MG/1; MG/1
1 TABLET, FILM COATED ORAL EVERY 12 HOURS
Qty: 10 TABLET | Refills: 0 | Status: SHIPPED | OUTPATIENT
Start: 2022-10-08 | End: 2022-10-13

## 2022-10-08 RX ADMIN — CHLORHEXIDINE GLUCONATE 0.12% ORAL RINSE 10 ML: 1.2 LIQUID ORAL at 08:10

## 2022-10-08 RX ADMIN — ACETAMINOPHEN 650 MG: 325 TABLET ORAL at 06:10

## 2022-10-08 RX ADMIN — FAMOTIDINE 20 MG: 20 TABLET ORAL at 08:10

## 2022-10-08 RX ADMIN — AMOXICILLIN AND CLAVULANATE POTASSIUM 1 TABLET: 875; 125 TABLET, FILM COATED ORAL at 10:10

## 2022-10-08 RX ADMIN — DOCUSATE SODIUM AND SENNOSIDES 1 TABLET: 8.6; 5 TABLET, FILM COATED ORAL at 08:10

## 2022-10-08 RX ADMIN — MUPIROCIN: 20 OINTMENT TOPICAL at 08:10

## 2022-10-08 RX ADMIN — ACETAMINOPHEN 650 MG: 325 TABLET ORAL at 12:10

## 2022-10-08 RX ADMIN — GABAPENTIN 300 MG: 300 CAPSULE ORAL at 08:10

## 2022-10-08 NOTE — DISCHARGE SUMMARY
[facility] Surg  General Surgery  Discharge Summary      Patient Name: Marlyn Gary  MRN: [id number]  Admission Date: 10/6/2022  Hospital Length of Stay: 2 days  Discharge Date and Time:  10/08/2022 10:07 AM  Attending Physician: Herman Cole MD   Discharging Provider: Aleyda Ty DO  Primary Care Provider: RAY Rick    HPI:   63yo F with previous perforated sigmoid diverticulitis with likely resultant colovaginal fistula who presents for definitive surgical management      Procedure(s) (LRB):  XI ROBOTIC COLECTOMY, SIGMOID (N/A)  BLOCK, TRANSVERSUS ABDOMINIS PLANE (N/A)  SIGMOIDOSCOPY, FLEXIBLE (N/A)  EXAM UNDER ANESTHESIA, VAGINA (N/A)  REPAIR, VAGINAL CUFF (N/A)  XI ROBOTIC LYSIS, ADHESIONS  MOBILIZATION, SPLENIC FLEXURE (N/A)      Indwelling Lines/Drains at time of discharge:   Lines/Drains/Airways     None               Hospital Course: 10/06/2022: Underwent robotic sigmoidectomy, colovaginal fistula takedown and repair    10/07/2022: POD 1. Tolerating diet well. No nausea or vomiting. Pain well controlled. No BM/flatus yet.    10/8/2022 POD 2. Tolerating diet. Passing stool and flatus. Reports no pain. Ambulating and urinating without issues. D/c home today.      Goals of Care Treatment Preferences:  Code Status: Full Code      Consults:     Significant Diagnostic Studies:     Pending Diagnostic Studies:     Procedure Component Value Units Date/Time    Specimen to Pathology, Surgery General Surgery [[id number]] Collected: 10/06/22 1539    Order Status: Sent Lab Status: In process Updated: 10/07/22 1001    Specimen: Tissue         Final Active Diagnoses:    Diagnosis Date Noted POA    PRINCIPAL PROBLEM:  Colovaginal fistula [N82.4] 10/07/2022 Yes      Problems Resolved During this Admission:      Discharged Condition: good    Disposition: Home or Self Care    Follow Up:    Patient Instructions:   No discharge procedures on file.  Medications:  Reconciled Home Medications:       Medication List      START taking these medications    amoxicillin-clavulanate 875-125mg 875-125 mg per tablet  Commonly known as: AUGMENTIN  Take 1 tablet by mouth every 12 (twelve) hours. for 5 days     docusate sodium 100 MG capsule  Commonly known as: COLACE  Take 1 capsule (100 mg total) by mouth 2 (two) times daily.     methocarbamoL 750 MG Tab  Commonly known as: ROBAXIN  Take 1 tablet (750 mg total) by mouth every 8 (eight) hours as needed (Muscle pain or spasm).     promethazine 25 MG tablet  Commonly known as: PHENERGAN  Take 1 tablet (25 mg total) by mouth every 4 (four) hours as needed for Nausea.        CONTINUE taking these medications    benazepriL 20 MG tablet  Commonly known as: LOTENSIN  Take 20 mg by mouth once daily.     bromfenac 0.07 % Drop  Commonly known as: PROLENSA  Apply 1 drop to eye once daily. (both eyes)     COMBIGAN 0.2-0.5 % Drop  Generic drug: brimonidine-timoloL  Place 1 drop into both eyes 2 (two) times daily.     difluprednate 0.05 % Drop ophthalmic solution  Commonly known as: DUREZOL  Place 1 drop into both eyes 4 (four) times daily.     hydroCHLOROthiazide 25 MG tablet  Commonly known as: HYDRODIURIL  Take 25 mg by mouth once daily.     HYDROcodone-acetaminophen 7.5-325 mg per tablet  Commonly known as: NORCO  Take 1 tablet by mouth every 6 (six) hours as needed for Pain.     latanoprost 0.005 % ophthalmic solution  Place 1 drop into both eyes nightly.     metFORMIN 500 MG tablet  Commonly known as: GLUCOPHAGE  Take 500 mg by mouth 2 (two) times daily.     ondansetron 4 MG Tbdl  Commonly known as: ZOFRAN-ODT  Dissolve 1 tablet (4 mg total) by mouth every 6 (six) hours as needed for nausea/vomiting     SYNTHROID 75 MCG tablet  Generic drug: levothyroxine  Take 75 mcg by mouth once daily.        STOP taking these medications    oxyCODONE 10 mg Tab immediate release tablet  Commonly known as: ROXICODONE          Time spent on the discharge of patient: 10 minutes    Aleyda PIERSON  DO Melony  General Surgery  [facility]

## 2022-10-08 NOTE — PLAN OF CARE
[facility]  Discharge Final Note    Primary Care Provider: RAY Rick    Expected Discharge Date: 10/8/2022    Final Discharge Note (most recent)       Final Note - 10/08/22 1131          Final Note    Assessment Type Final Discharge Note     Anticipated Discharge Disposition Home or Self Care        Post-Acute Status    Discharge Delays None known at this time                     Important Message from Medicare             Contact Info       Herman Cole MD   Specialty: Colon and Rectal Surgery, General Surgery    [address] [place] [address] [address] [id number]   Phone: [phone]       Next Steps: Schedule an appointment as soon as possible for a visit in 2 week(s)    Instructions: For wound re-check

## 2022-10-08 NOTE — PLAN OF CARE
Bed locked, in lowest position. Call bell in reach. Purposeful rounding done every two hours. Pt denies any pain. Chart check complete. Will continue with plan of care.

## 2022-10-08 NOTE — DISCHARGE INSTRUCTIONS
Discharge Instructions    Hygiene and incision care:   You may shower but do not soak such as in a bathtub or pool. Your incisions are closed with absorbable sutures and there is surgical glue on top. The glue will eventually flake off on its own. Do not scrub your incisions, just allow warm soapy water to run over them.   If you develop fevers, worsening redness and/or pus-like drainage, call the office immediately.    Pain control:   You may take Tylenol (650 mg every 4 hours) and ibuprofen (600 mg every 6 hours) as well as alternate heat and cold packs for pain and swelling. Take ibuprofen with food as it can cause stomach upset and ulcers. Do not take ibuprofen if you have an increased risk of bleeding, history of stomach ulcers, are already taking an NSAID, or have kidney issues.   If taking narcotic pain medication, such as Norco (hydrocodone-acetaminophen) or Percocet (oxycodone-acetaminophen), do not drink alcohol or drive. Each Norco and Percocet tablet contains 325 mg of Tylenol; do not take more than 4000 mg of Tylenol per day. Narcotic pain medications can be constipating so be sure to drink plenty of water and take an over the counter stool softener such as colace (100 mg twice a day) or miralax (17 g once a day).    Activity:   No heavy lifting >10 lbs for 6 weeks while your incisions are healing as it might result in a hernia. Avoid straining, pushing, and pulling. It is okay to walk and slowly go up and down stairs.   Make sure to do leg and ankle exercises and take deep breaths frequently to avoid developing blood clots or pneumonia.    Diet:   You may resume your regular diet. Some people find it best to stick to soft, bland, and easily digestible foods for the first couple of days while the anesthesia is leaving their system or if they're taking narcotic pain medicine to avoid nausea and vomiting. Be sure to eat good, nutritious foods such as vegetables and lean proteins to give your body the  nutrients it needs to heal. I recommend also taking vitamin C as this can aid with wound healing.    For any questions or concerns, please do not hesitate to contact the office any time at [phone] or send me a [facility] message.

## 2022-10-14 LAB
COMMENT: NORMAL
FINAL PATHOLOGIC DIAGNOSIS: NORMAL
GROSS: NORMAL
Lab: NORMAL

## 2022-10-15 ENCOUNTER — TELEPHONE (OUTPATIENT)
Dept: OBSTETRICS AND GYNECOLOGY | Facility: CLINIC | Age: 62
End: 2022-10-15
Payer: COMMERCIAL

## 2022-10-15 NOTE — TELEPHONE ENCOUNTER
I was consulted intra-op for a colovaginal fistula.  Please schedule a post-op check with me in 1 month.

## 2022-10-21 ENCOUNTER — TELEPHONE (OUTPATIENT)
Dept: SURGERY | Facility: CLINIC | Age: 62
End: 2022-10-21
Payer: COMMERCIAL

## 2022-10-21 NOTE — TELEPHONE ENCOUNTER
Spoke with patient notifying her it is OK to take Miralax for constipation, she verbalized understanding and denied any further needs at this time.     ----- Message from Arturo Shannon sent at 10/20/2022  3:52 PM CDT -----  Contact: Pt [phone]  Pt called in regards to wanting to know if it will ok for her to take Miralax due to her bowels are hard please

## 2022-10-24 ENCOUNTER — OFFICE VISIT (OUTPATIENT)
Dept: SURGERY | Facility: CLINIC | Age: 62
End: 2022-10-24
Payer: COMMERCIAL

## 2022-10-24 VITALS
SYSTOLIC BLOOD PRESSURE: 117 MMHG | HEART RATE: 86 BPM | HEIGHT: 65 IN | DIASTOLIC BLOOD PRESSURE: 80 MMHG | WEIGHT: 176.5 LBS | TEMPERATURE: 98 F | BODY MASS INDEX: 29.41 KG/M2

## 2022-10-24 DIAGNOSIS — K57.32 DIVERTICULITIS LARGE INTESTINE W/O PERFORATION OR ABSCESS W/O BLEEDING: ICD-10-CM

## 2022-10-24 DIAGNOSIS — N82.4 COLOVAGINAL FISTULA: Primary | ICD-10-CM

## 2022-10-24 PROCEDURE — 3074F SYST BP LT 130 MM HG: CPT | Mod: CPTII,S$GLB,, | Performed by: COLON & RECTAL SURGERY

## 2022-10-24 PROCEDURE — 3074F PR MOST RECENT SYSTOLIC BLOOD PRESSURE < 130 MM HG: ICD-10-PCS | Mod: CPTII,S$GLB,, | Performed by: COLON & RECTAL SURGERY

## 2022-10-24 PROCEDURE — 99024 PR POST-OP FOLLOW-UP VISIT: ICD-10-PCS | Mod: S$GLB,,, | Performed by: COLON & RECTAL SURGERY

## 2022-10-24 PROCEDURE — 4010F PR ACE/ARB THEARPY RXD/TAKEN: ICD-10-PCS | Mod: CPTII,S$GLB,, | Performed by: COLON & RECTAL SURGERY

## 2022-10-24 PROCEDURE — 3079F DIAST BP 80-89 MM HG: CPT | Mod: CPTII,S$GLB,, | Performed by: COLON & RECTAL SURGERY

## 2022-10-24 PROCEDURE — 4010F ACE/ARB THERAPY RXD/TAKEN: CPT | Mod: CPTII,S$GLB,, | Performed by: COLON & RECTAL SURGERY

## 2022-10-24 PROCEDURE — 99999 PR PBB SHADOW E&M-EST. PATIENT-LVL III: ICD-10-PCS | Mod: PBBFAC,,, | Performed by: COLON & RECTAL SURGERY

## 2022-10-24 PROCEDURE — 99999 PR PBB SHADOW E&M-EST. PATIENT-LVL III: CPT | Mod: PBBFAC,,, | Performed by: COLON & RECTAL SURGERY

## 2022-10-24 PROCEDURE — 99024 POSTOP FOLLOW-UP VISIT: CPT | Mod: S$GLB,,, | Performed by: COLON & RECTAL SURGERY

## 2022-10-24 PROCEDURE — 3079F PR MOST RECENT DIASTOLIC BLOOD PRESSURE 80-89 MM HG: ICD-10-PCS | Mod: CPTII,S$GLB,, | Performed by: COLON & RECTAL SURGERY

## 2022-10-24 NOTE — PROGRESS NOTES
History & Physical    SUBJECTIVE:     History of Present Illness:  Patient is a 62 y.o. female presents for evaluation of left lower quadrant abdominal pain that has been occurring 1-2 times a year for about 5 years. It has been occurring more consistently over the last month. She has been to the ED 4 times since the beginning of June. She has associated nausea, vomiting, and non-bloody diarrhea associated with the pain. Her last visit to the ED resulted in admission and transfer to [facility]. She started to have gelatinous yellow vaginal discharge two days into the admission, and her abdominal pain improved. Continues with stool and flatus per vagina. No urinary symptoms. Her last colonoscopy was about 2 years ago and had some polyps. She has a family history of two uncles and a sister with colon cancer.      10/06/2022: Robotic sigmoidectomy with repair of vaginal cuff by CHRIS    Interval history:  Since last clinic visit, the patient underwent robotic sigmoidectomy on 10/06/2022. She is feeling great today with no complaints. She is having soft, easy-to-pass bowel movements. She had one episode of hematochezia since surgery, but this has since resolved. She is tolerating a regular diet and denies fevers, chills, nausea, vomiting, and abdominal pain. She denies any stool or flatus per vagina as well has hematuria and dysuria.    Chief Complaint   Patient presents with    Post-op Evaluation     Sig Colectomy        Review of patient's allergies indicates:  No Known Allergies    Current Outpatient Medications   Medication Sig Dispense Refill    benazepriL (LOTENSIN) 20 MG tablet Take 20 mg by mouth once daily.      bromfenac (PROLENSA) 0.07 % Drop Apply 1 drop to eye once daily. (both eyes)      COMBIGAN 0.2-0.5 % Drop Place 1 drop into both eyes 2 (two) times daily.      difluprednate (DUREZOL) 0.05 % Drop ophthalmic solution Place 1 drop into both eyes 4 (four) times daily.      docusate sodium (COLACE) 100 MG  capsule Take 1 capsule (100 mg total) by mouth 2 (two) times daily. 60 capsule 1    hydroCHLOROthiazide (HYDRODIURIL) 25 MG tablet Take 25 mg by mouth once daily.      HYDROcodone-acetaminophen (NORCO) 7.5-325 mg per tablet Take 1 tablet by mouth every 6 (six) hours as needed for Pain. 25 tablet 0    latanoprost 0.005 % ophthalmic solution Place 1 drop into both eyes nightly.      metFORMIN (GLUCOPHAGE) 500 MG tablet Take 500 mg by mouth 2 (two) times daily.      methocarbamoL (ROBAXIN) 750 MG Tab Take 1 tablet (750 mg total) by mouth every 8 (eight) hours as needed (Muscle pain or spasm). 21 tablet 1    ondansetron (ZOFRAN-ODT) 4 MG TbDL Dissolve 1 tablet (4 mg total) by mouth every 6 (six) hours as needed for nausea/vomiting 31 tablet 2    promethazine (PHENERGAN) 25 MG tablet Take 1 tablet (25 mg total) by mouth every 4 (four) hours as needed for Nausea. 15 tablet 0    SYNTHROID 75 mcg tablet Take 75 mcg by mouth once daily.       No current facility-administered medications for this visit.       Past Medical History:   Diagnosis Date    Diverticular disease of large intestine without perforation or abscess     HTN (hypertension)     Thyroid disease     Type 2 diabetes mellitus with unspecified diabetic retinopathy without macular edema     Unspecified glaucoma      Past Surgical History:   Procedure Laterality Date    COLONOSCOPY N/A 9/9/2022    Procedure: COLONOSCOPY;  Surgeon: Herman Cole MD;  Location: [facility];  Service: General;  Laterality: N/A;    EXTRACTION OF CATARACT Bilateral     FLEXIBLE SIGMOIDOSCOPY N/A 10/6/2022    Procedure: SIGMOIDOSCOPY, FLEXIBLE;  Surgeon: Herman Cole MD;  Location: [facility];  Service: General;  Laterality: N/A;    HEMORRHOID SURGERY      HYSTERECTOMY      INJECTION OF ANESTHETIC AGENT INTO TISSUE PLANE DEFINED BY TRANSVERSUS ABDOMINIS MUSCLE N/A 10/6/2022    Procedure: BLOCK, TRANSVERSUS ABDOMINIS PLANE;  Surgeon: Herman Cole MD;  Location: [facility];   Service: General;  Laterality: N/A;    MOBILIZATION OF SPLENIC FLEXURE N/A 10/6/2022    Procedure: MOBILIZATION, SPLENIC FLEXURE;  Surgeon: Herman Cole MD;  Location: [facility] OR;  Service: General;  Laterality: N/A;  Colovaginal fistula takedown    REDUCTION OF BOTH BREASTS      REPAIR OF VAGINAL CUFF N/A 10/6/2022    Procedure: REPAIR, VAGINAL CUFF;  Surgeon: Bernadine Dial MD;  Location: [facility] OR;  Service: OB/GYN;  Laterality: N/A;    ROBOT-ASSISTED LAPAROSCOPIC LYSIS OF ADHESIONS USING DA NICOLE XI  10/6/2022    Procedure: XI ROBOTIC LYSIS, ADHESIONS;  Surgeon: Herman Cole MD;  Location: [facility] OR;  Service: General;;    ROBOT-ASSISTED LAPAROSCOPIC RESECTION OF SIGMOID COLON USING DA NICOLE XI N/A 10/6/2022    Procedure: XI ROBOTIC COLECTOMY, SIGMOID;  Surgeon: Herman Cole MD;  Location: [facility] OR;  Service: General;  Laterality: N/A;    VAGINA EXAMINATION UNDER ANESTHESIA N/A 10/6/2022    Procedure: EXAM UNDER ANESTHESIA, VAGINA;  Surgeon: Bernadine Dial MD;  Location: [facility] OR;  Service: OB/GYN;  Laterality: N/A;     No family history on file.  Social History     Tobacco Use    Smoking status: Never    Smokeless tobacco: Never   Substance Use Topics    Alcohol use: Never    Drug use: Never        Review of Systems:  Review of Systems   Constitutional:  Negative for activity change, appetite change, chills, fatigue, fever and unexpected weight change.   HENT:  Negative for congestion, ear pain, sore throat and trouble swallowing.    Eyes:  Negative for pain, redness and itching.   Respiratory:  Negative for cough, shortness of breath, wheezing and stridor.    Cardiovascular:  Negative for chest pain, palpitations and leg swelling.   Gastrointestinal:  Negative for abdominal distention, abdominal pain, constipation, diarrhea, nausea and vomiting.   Endocrine: Negative for cold intolerance, heat intolerance and polyuria.   Genitourinary:  Negative for difficulty urinating, dysuria, flank pain,  "frequency, hematuria and urgency.   Musculoskeletal:  Negative for gait problem, joint swelling and neck pain.   Skin:  Negative for color change, pallor, rash and wound.   Allergic/Immunologic: Negative for environmental allergies and immunocompromised state.   Neurological:  Negative for dizziness, speech difficulty, weakness and numbness.   Hematological:  Does not bruise/bleed easily.   Psychiatric/Behavioral:  Negative for agitation, confusion and hallucinations.      OBJECTIVE:     Vital Signs (Most Recent)  Temp: 98.4 °F (36.9 °C) (10/24/22 1349)  Pulse: 86 (10/24/22 1349)  BP: 117/80 (10/24/22 1349)  5' 5.4" (1.661 m)  80 kg (176 lb 7.7 oz)     Physical Exam:  Physical Exam  Vitals reviewed.   Constitutional:       General: She is not in acute distress.     Appearance: Normal appearance. She is well-developed. She is not ill-appearing.   HENT:      Head: Normocephalic and atraumatic.      Right Ear: External ear normal.      Left Ear: External ear normal.   Eyes:      Extraocular Movements: Extraocular movements intact.      Conjunctiva/sclera: Conjunctivae normal.   Cardiovascular:      Rate and Rhythm: Normal rate.   Pulmonary:      Effort: Pulmonary effort is normal. No respiratory distress.   Abdominal:      Comments: Soft, non-distended, and non-tender. Incisions healing well without SOI.    Musculoskeletal:      Cervical back: Neck supple.   Skin:     General: Skin is warm and dry.   Neurological:      Mental Status: She is alert and oriented to person, place, and time.   Psychiatric:         Behavior: Behavior normal.           Diagnostic Results:  All recent CT scans reviewed.     Pathology:  Final Pathologic Diagnosis 1. Sigmoid colon and upper rectum, partial colectomy:   - Segment of bowel with diverticular disease   - Negative for dysplasia   2. Colon, anastomosis donuts, partial colectomy:   - Segment of bowel with no diagnostic histopathologic alterations   - Negative for dysplasia   3. Vaginal " cuff, biopsy:   - Squamous mucosa with minimal reactive changes   - Negative for significant inflammation, dysplasia, and malignancy        ASSESSMENT/PLAN:     63 y/o female with perforated sigmoid diverticulitis complicated by likely colovaginal fistula and sigmoid stenosis now s/p resection with final pathology benign who is doing well and has recovered well    - Pathology reviewed.  - no further surgical intervention required at this time.  Encourage the patient to avoid sex relations and have pelvic rest as per OBGYN recommendations.  - RTC as needed.      Herman Cole MD  Colon and Rectal Surgery  [facility]

## 2022-11-17 ENCOUNTER — OFFICE VISIT (OUTPATIENT)
Dept: OBSTETRICS AND GYNECOLOGY | Facility: CLINIC | Age: 62
End: 2022-11-17
Payer: COMMERCIAL

## 2022-11-17 VITALS — SYSTOLIC BLOOD PRESSURE: 124 MMHG | DIASTOLIC BLOOD PRESSURE: 76 MMHG | BODY MASS INDEX: 29.75 KG/M2 | WEIGHT: 181 LBS

## 2022-11-17 DIAGNOSIS — Z09 STATUS POST GYNECOLOGICAL SURGERY, FOLLOW-UP EXAM: Primary | ICD-10-CM

## 2022-11-17 DIAGNOSIS — N82.4 COLOVAGINAL FISTULA: ICD-10-CM

## 2022-11-17 PROCEDURE — 4010F ACE/ARB THERAPY RXD/TAKEN: CPT | Mod: CPTII,S$GLB,, | Performed by: OBSTETRICS & GYNECOLOGY

## 2022-11-17 PROCEDURE — 3074F PR MOST RECENT SYSTOLIC BLOOD PRESSURE < 130 MM HG: ICD-10-PCS | Mod: CPTII,S$GLB,, | Performed by: OBSTETRICS & GYNECOLOGY

## 2022-11-17 PROCEDURE — 3008F PR BODY MASS INDEX (BMI) DOCUMENTED: ICD-10-PCS | Mod: CPTII,S$GLB,, | Performed by: OBSTETRICS & GYNECOLOGY

## 2022-11-17 PROCEDURE — 99024 PR POST-OP FOLLOW-UP VISIT: ICD-10-PCS | Mod: S$GLB,,, | Performed by: OBSTETRICS & GYNECOLOGY

## 2022-11-17 PROCEDURE — 99999 PR PBB SHADOW E&M-EST. PATIENT-LVL III: ICD-10-PCS | Mod: PBBFAC,,, | Performed by: OBSTETRICS & GYNECOLOGY

## 2022-11-17 PROCEDURE — 3078F PR MOST RECENT DIASTOLIC BLOOD PRESSURE < 80 MM HG: ICD-10-PCS | Mod: CPTII,S$GLB,, | Performed by: OBSTETRICS & GYNECOLOGY

## 2022-11-17 PROCEDURE — 1159F MED LIST DOCD IN RCRD: CPT | Mod: CPTII,S$GLB,, | Performed by: OBSTETRICS & GYNECOLOGY

## 2022-11-17 PROCEDURE — 99999 PR PBB SHADOW E&M-EST. PATIENT-LVL III: CPT | Mod: PBBFAC,,, | Performed by: OBSTETRICS & GYNECOLOGY

## 2022-11-17 PROCEDURE — 99024 POSTOP FOLLOW-UP VISIT: CPT | Mod: S$GLB,,, | Performed by: OBSTETRICS & GYNECOLOGY

## 2022-11-17 PROCEDURE — 3078F DIAST BP <80 MM HG: CPT | Mod: CPTII,S$GLB,, | Performed by: OBSTETRICS & GYNECOLOGY

## 2022-11-17 PROCEDURE — 1159F PR MEDICATION LIST DOCUMENTED IN MEDICAL RECORD: ICD-10-PCS | Mod: CPTII,S$GLB,, | Performed by: OBSTETRICS & GYNECOLOGY

## 2022-11-17 PROCEDURE — 3008F BODY MASS INDEX DOCD: CPT | Mod: CPTII,S$GLB,, | Performed by: OBSTETRICS & GYNECOLOGY

## 2022-11-17 PROCEDURE — 3074F SYST BP LT 130 MM HG: CPT | Mod: CPTII,S$GLB,, | Performed by: OBSTETRICS & GYNECOLOGY

## 2022-11-17 PROCEDURE — 4010F PR ACE/ARB THEARPY RXD/TAKEN: ICD-10-PCS | Mod: CPTII,S$GLB,, | Performed by: OBSTETRICS & GYNECOLOGY

## 2022-11-17 RX ORDER — DORZOLAMIDE HCL 20 MG/ML
SOLUTION/ DROPS OPHTHALMIC
COMMUNITY
Start: 2022-09-07

## 2022-11-18 NOTE — PROGRESS NOTES
Subjective:       Patient ID: Marlyn Gary is a 62 y.o. female.    Chief Complaint:  Post-op Evaluation      History of Present Illness  HPI  Presents for post-op check.  Pt is s/p the following surgery with Dr. Cole on 10/6/22:  Robotic low anterior resection  Robotic mobilization of splenic flexure  Robotic lysis of adhesions  Colovaginal fistula takedown  Flexible sigmoidoscopy  Transversus abdominis plane block    I was called in intraoperatively to evaluate the site of the fistula, and oversewed the hole in the cuff through a vaginal approach.  Since surgery, pt feels great.  Denies any pain.  No vaginal bleeding, itching, discharge, or odor.  She and her  have not resumed intercourse yet.    Pt had a MMG done this year at an outside facility.    GYN & OB History  No LMP recorded. Patient has had a hysterectomy.   Date of Last Pap: No result found    OB History   No obstetric history on file.       Review of Systems  Review of Systems   Constitutional:  Negative for fatigue, fever and unexpected weight change.   Gastrointestinal:  Negative for abdominal pain, constipation, diarrhea, nausea and vomiting.   Genitourinary:  Negative for dysuria, frequency, pelvic pain, urgency, vaginal bleeding, vaginal discharge, vaginal pain and vaginal odor.         Objective:    Physical Exam:   Constitutional: She is oriented to person, place, and time. She appears well-developed and well-nourished. No distress.             Abdominal: Soft. She exhibits no distension and no mass. There is no abdominal tenderness. There is no rebound and no guarding. Hernia confirmed negative in the right inguinal area and confirmed negative in the left inguinal area.     Genitourinary:    Vagina normal.      Pelvic exam was performed with patient supine.   There is no rash, tenderness, lesion or injury on the right labia. There is no rash, tenderness, lesion or injury on the left labia. Right adnexum displays no mass, no  tenderness and no fullness. Left adnexum displays no mass, no tenderness and no fullness. Vaginal cuff normal.  No erythema,  no vaginal discharge, tenderness, bleeding, rectocele, cystocele or unspecified prolapse of vaginal walls in the vagina.    No foreign body in the vagina.      No signs of injury (cuff intact; healing well; suture material still present) in the vagina.   Cervix is absent.Uterus is absent.               Neurological: She is alert and oriented to person, place, and time.     Psychiatric: She has a normal mood and affect.        Assessment:        1. Status post gynecological surgery, follow-up exam    2. Colovaginal fistula                Plan:      Marlyn was seen today for post-op evaluation.    Diagnoses and all orders for this visit:    Status post gynecological surgery, follow-up exam    Colovaginal fistula   Doing well, and cuff is healing well.  Continue pelvic rest until 12 weeks post-op (cuff was very indurated) to allow tensile strength to develop.  RTC prn.

## 2023-11-02 ENCOUNTER — HOSPITAL ENCOUNTER (EMERGENCY)
Facility: HOSPITAL | Age: 63
Discharge: HOME | End: 2023-11-02
Attending: EMERGENCY MEDICINE
Payer: MEDICARE

## 2023-11-02 VITALS
BODY MASS INDEX: 30.58 KG/M2 | DIASTOLIC BLOOD PRESSURE: 68 MMHG | OXYGEN SATURATION: 97 % | HEART RATE: 82 BPM | RESPIRATION RATE: 16 BRPM | WEIGHT: 179.13 LBS | SYSTOLIC BLOOD PRESSURE: 127 MMHG | HEIGHT: 64 IN | TEMPERATURE: 98 F

## 2023-11-02 DIAGNOSIS — M54.12 CERVICAL RADICULITIS: Primary | ICD-10-CM

## 2023-11-02 DIAGNOSIS — M54.2 NECK PAIN: ICD-10-CM

## 2023-11-02 PROCEDURE — 99284 EMERGENCY DEPT VISIT MOD MDM: CPT

## 2023-11-02 PROCEDURE — 63600175 PHARM REV CODE 636 W HCPCS: Performed by: REGISTERED NURSE

## 2023-11-02 PROCEDURE — 25000003 PHARM REV CODE 250: Performed by: REGISTERED NURSE

## 2023-11-02 PROCEDURE — 96372 THER/PROPH/DIAG INJ SC/IM: CPT | Performed by: REGISTERED NURSE

## 2023-11-02 RX ORDER — METHOCARBAMOL 500 MG/1
500 TABLET, FILM COATED ORAL
Status: COMPLETED | OUTPATIENT
Start: 2023-11-02 | End: 2023-11-02

## 2023-11-02 RX ORDER — HYDROCODONE BITARTRATE AND ACETAMINOPHEN 5; 325 MG/1; MG/1
1 TABLET ORAL EVERY 6 HOURS PRN
Qty: 12 TABLET | Refills: 0 | Status: SHIPPED | OUTPATIENT
Start: 2023-11-02

## 2023-11-02 RX ORDER — METHOCARBAMOL 500 MG/1
500 TABLET, FILM COATED ORAL 3 TIMES DAILY
Qty: 15 TABLET | Refills: 0 | Status: SHIPPED | OUTPATIENT
Start: 2023-11-02 | End: 2023-11-07

## 2023-11-02 RX ORDER — HYDROCODONE BITARTRATE AND ACETAMINOPHEN 5; 325 MG/1; MG/1
1 TABLET ORAL
Status: COMPLETED | OUTPATIENT
Start: 2023-11-02 | End: 2023-11-02

## 2023-11-02 RX ORDER — DEXAMETHASONE SODIUM PHOSPHATE 4 MG/ML
8 INJECTION, SOLUTION INTRA-ARTICULAR; INTRALESIONAL; INTRAMUSCULAR; INTRAVENOUS; SOFT TISSUE
Status: COMPLETED | OUTPATIENT
Start: 2023-11-02 | End: 2023-11-02

## 2023-11-02 RX ADMIN — HYDROCODONE BITARTRATE AND ACETAMINOPHEN 1 TABLET: 5; 325 TABLET ORAL at 09:11

## 2023-11-02 RX ADMIN — METHOCARBAMOL 500 MG: 500 TABLET ORAL at 09:11

## 2023-11-02 RX ADMIN — DEXAMETHASONE SODIUM PHOSPHATE 8 MG: 4 INJECTION INTRA-ARTICULAR; INTRALESIONAL; INTRAMUSCULAR; INTRAVENOUS; SOFT TISSUE at 09:11

## 2023-11-02 NOTE — ED PROVIDER NOTES
Encounter Date: 11/2/2023       History     Chief Complaint   Patient presents with    Shoulder Pain     Pt c/o left neck, shoulder, and arm pain x5 days. Pt denies injury/trauma. Pt states she has previously had a pinched nerve in the same area.      63-year-old female presents emergency department complaints of left neck pain and shoulder pain.  Patient reports symptoms began approximately 5 days ago.  Associated symptoms include numbness to fingertips.  Patient has a history of pinched nerve to the neck.  Patient reports pain is worse with turning her neck.  Patient denies any headache, fever, chills, chest pain, shortness of breath or any other symptoms at this time.    The history is provided by the patient.     Review of patient's allergies indicates:  No Known Allergies  Past Medical History:   Diagnosis Date    Diverticular disease of large intestine without perforation or abscess     HTN (hypertension)     Thyroid disease     Type 2 diabetes mellitus with unspecified diabetic retinopathy without macular edema     Unspecified glaucoma      Past Surgical History:   Procedure Laterality Date    COLONOSCOPY N/A 9/9/2022    Procedure: COLONOSCOPY;  Surgeon: Herman Cole MD;  Location: [facility];  Service: General;  Laterality: N/A;    EXTRACTION OF CATARACT Bilateral     FLEXIBLE SIGMOIDOSCOPY N/A 10/6/2022    Procedure: SIGMOIDOSCOPY, FLEXIBLE;  Surgeon: Herman Cole MD;  Location: [facility];  Service: General;  Laterality: N/A;    HEMORRHOID SURGERY      HYSTERECTOMY      INJECTION OF ANESTHETIC AGENT INTO TISSUE PLANE DEFINED BY TRANSVERSUS ABDOMINIS MUSCLE N/A 10/6/2022    Procedure: BLOCK, TRANSVERSUS ABDOMINIS PLANE;  Surgeon: Herman Cole MD;  Location: [facility];  Service: General;  Laterality: N/A;    MOBILIZATION OF SPLENIC FLEXURE N/A 10/6/2022    Procedure: MOBILIZATION, SPLENIC FLEXURE;  Surgeon: Herman Cole MD;  Location: [facility];  Service: General;  Laterality: N/A;   Colovaginal fistula takedown    REDUCTION OF BOTH BREASTS      REPAIR OF VAGINAL CUFF N/A 10/6/2022    Procedure: REPAIR, VAGINAL CUFF;  Surgeon: Bernadine Dial MD;  Location: [facility] OR;  Service: OB/GYN;  Laterality: N/A;    ROBOT-ASSISTED LAPAROSCOPIC LYSIS OF ADHESIONS USING DA NICOLE XI  10/6/2022    Procedure: XI ROBOTIC LYSIS, ADHESIONS;  Surgeon: Herman Cole MD;  Location: [facility] OR;  Service: General;;    ROBOT-ASSISTED LAPAROSCOPIC RESECTION OF SIGMOID COLON USING DA NICOLE XI N/A 10/6/2022    Procedure: XI ROBOTIC COLECTOMY, SIGMOID;  Surgeon: Herman Cole MD;  Location: [facility] OR;  Service: General;  Laterality: N/A;    VAGINA EXAMINATION UNDER ANESTHESIA N/A 10/6/2022    Procedure: EXAM UNDER ANESTHESIA, VAGINA;  Surgeon: Bernadine Dial MD;  Location: [facility] OR;  Service: OB/GYN;  Laterality: N/A;     History reviewed. No pertinent family history.  Social History     Tobacco Use    Smoking status: Never    Smokeless tobacco: Never   Substance Use Topics    Alcohol use: Never    Drug use: Never     Review of Systems   Constitutional:  Negative for fever.   HENT:  Negative for sore throat.    Respiratory:  Negative for shortness of breath.    Cardiovascular:  Negative for chest pain.   Gastrointestinal:  Negative for nausea.   Genitourinary:  Negative for dysuria.   Musculoskeletal:  Positive for neck pain and neck stiffness. Negative for back pain.        +left arm pain   Skin:  Negative for rash.   Neurological:  Negative for weakness.   Hematological:  Does not bruise/bleed easily.   All other systems reviewed and are negative.      Physical Exam     Initial Vitals [11/02/23 0920]   BP Pulse Resp Temp SpO2   127/68 82 18 98.1 °F (36.7 °C) 97 %      MAP       --         Physical Exam    Constitutional: She appears well-developed and well-nourished. She is not diaphoretic. No distress.   HENT:   Head: Normocephalic and atraumatic.   Eyes: Conjunctivae and EOM are normal. Pupils are equal, round, and  reactive to light.   Neck: Neck supple.       Normal range of motion.  Cardiovascular:  Normal rate, regular rhythm and normal heart sounds.           No murmur heard.  Pulmonary/Chest: Breath sounds normal. No respiratory distress. She has no wheezes. She has no rales.   Abdominal: Abdomen is soft. Bowel sounds are normal. There is no abdominal tenderness. There is no rebound and no guarding.   Musculoskeletal:         General: No edema. Normal range of motion.      Cervical back: Normal range of motion and neck supple. Tenderness present. No bony tenderness. Muscular tenderness present. No spinous process tenderness.      Thoracic back: Normal.      Lumbar back: Normal.      Comments: Equal  strength 5/5 bilaterally     Neurological: She is alert and oriented to person, place, and time. No cranial nerve deficit. GCS score is 15. GCS eye subscore is 4. GCS verbal subscore is 5. GCS motor subscore is 6.   Skin: Skin is warm and dry. Capillary refill takes less than 2 seconds.   Psychiatric: She has a normal mood and affect. Thought content normal.         ED Course   Procedures  Labs Reviewed - No data to display       Imaging Results              X-Ray Cervical Spine AP And Lateral (Final result)  Result time 11/02/23 09:56:19      Final result by Nara CrowderDavid), MD (11/02/23 09:56:19)                   Impression:      Degenerative changes are present most prominent C5-6 and C6-7.      Electronically signed by: Nara Crowder MD  Date:    11/02/2023  Time:    09:56               Narrative:    EXAMINATION:  XR CERVICAL SPINE AP LATERAL    CLINICAL HISTORY:  Cervicalgia    COMPARISON:  None    FINDINGS:  Three views.  Mild cervical spondylotic changes are present C5-6 and C6-7.  Multilevel hypertrophy of the uncovertebral joints most prominent at C5-6 and C6-7. no fractures.                                       Medications   dexAMETHasone injection 8 mg (8 mg Intramuscular Given 11/2/23 9494)    HYDROcodone-acetaminophen 5-325 mg per tablet 1 tablet (1 tablet Oral Given 11/2/23 0953)   methocarbamoL tablet 500 mg (500 mg Oral Given 11/2/23 0954)     Medical Decision Making  Amount and/or Complexity of Data Reviewed  Radiology: ordered.     Details: Degenerative changes    Risk  Prescription drug management.  Risk Details: Treat with medications.  Follow up with primary care                               Clinical Impression:   Final diagnoses:  [M54.2] Neck pain  [M54.12] Cervical radiculitis (Primary)        ED Disposition Condition    Discharge Stable          ED Prescriptions       Medication Sig Dispense Start Date End Date Auth. Provider    HYDROcodone-acetaminophen (NORCO) 5-325 mg per tablet Take 1 tablet by mouth every 6 (six) hours as needed for Pain. 12 tablet 11/2/2023 -- Sunday Ross Jr., FNP    methocarbamoL (ROBAXIN) 500 MG Tab Take 1 tablet (500 mg total) by mouth 3 (three) times daily. for 5 days 15 tablet 11/2/2023 11/7/2023 Sunday Ross Jr., FNP          Follow-up Information       Follow up With Specialties Details Why Contact Info    Bita Williamson PA [facility] In 1 week  [address]  [address]  [facility]  [place] [address]  [phone]               Sunday Ross Jr., FNP  11/02/23 1038

## 2024-12-12 ENCOUNTER — HOSPITAL ENCOUNTER (EMERGENCY)
Facility: HOSPITAL | Age: 64
Discharge: HOME | End: 2024-12-13
Attending: EMERGENCY MEDICINE
Payer: MEDICARE

## 2024-12-12 DIAGNOSIS — R06.02 SHORTNESS OF BREATH: ICD-10-CM

## 2024-12-12 DIAGNOSIS — J45.901 EXACERBATION OF ASTHMA, UNSPECIFIED ASTHMA SEVERITY, UNSPECIFIED WHETHER PERSISTENT: Primary | ICD-10-CM

## 2024-12-12 DIAGNOSIS — J06.9 UPPER RESPIRATORY TRACT INFECTION, UNSPECIFIED TYPE: ICD-10-CM

## 2024-12-12 LAB
ALBUMIN SERPL BCP-MCNC: 4.3 G/DL (ref 3.5–5.2)
ALP SERPL-CCNC: 76 U/L (ref 40–150)
ALT SERPL W/O P-5'-P-CCNC: 28 U/L (ref 10–44)
ANION GAP SERPL CALC-SCNC: 14 MMOL/L (ref 8–16)
AST SERPL-CCNC: 26 U/L (ref 10–40)
BASOPHILS # BLD AUTO: 0.08 K/UL (ref 0–0.2)
BASOPHILS NFR BLD: 0.6 % (ref 0–1.9)
BILIRUB SERPL-MCNC: 0.8 MG/DL (ref 0.1–1)
BNP SERPL-MCNC: <10 PG/ML (ref 0–99)
BUN SERPL-MCNC: 9 MG/DL (ref 8–23)
CALCIUM SERPL-MCNC: 9.7 MG/DL (ref 8.7–10.5)
CHLORIDE SERPL-SCNC: 98 MMOL/L (ref 95–110)
CO2 SERPL-SCNC: 24 MMOL/L (ref 23–29)
CREAT SERPL-MCNC: 0.8 MG/DL (ref 0.5–1.4)
DIFFERENTIAL METHOD BLD: ABNORMAL
EOSINOPHIL # BLD AUTO: 0.2 K/UL (ref 0–0.5)
EOSINOPHIL NFR BLD: 1.6 % (ref 0–8)
ERYTHROCYTE [DISTWIDTH] IN BLOOD BY AUTOMATED COUNT: 13.7 % (ref 11.5–14.5)
EST. GFR  (NO RACE VARIABLE): >60 ML/MIN/1.73 M^2
GLUCOSE SERPL-MCNC: 97 MG/DL (ref 70–110)
HCT VFR BLD AUTO: 48.2 % (ref 37–48.5)
HCV AB SERPL QL IA: NEGATIVE
HEP C VIRUS HOLD SPECIMEN: NORMAL
HGB BLD-MCNC: 15.8 G/DL (ref 12–16)
HIV 1+2 AB+HIV1 P24 AG SERPL QL IA: NEGATIVE
IMM GRANULOCYTES # BLD AUTO: 0.06 K/UL (ref 0–0.04)
IMM GRANULOCYTES NFR BLD AUTO: 0.4 % (ref 0–0.5)
INFLUENZA A, MOLECULAR: NEGATIVE
INFLUENZA B, MOLECULAR: NEGATIVE
LYMPHOCYTES # BLD AUTO: 3.2 K/UL (ref 1–4.8)
LYMPHOCYTES NFR BLD: 23.3 % (ref 18–48)
MCH RBC QN AUTO: 28.5 PG (ref 27–31)
MCHC RBC AUTO-ENTMCNC: 32.8 G/DL (ref 32–36)
MCV RBC AUTO: 87 FL (ref 82–98)
MONOCYTES # BLD AUTO: 0.9 K/UL (ref 0.3–1)
MONOCYTES NFR BLD: 6.4 % (ref 4–15)
NEUTROPHILS # BLD AUTO: 9.2 K/UL (ref 1.8–7.7)
NEUTROPHILS NFR BLD: 67.7 % (ref 38–73)
NRBC BLD-RTO: 0 /100 WBC
PLATELET # BLD AUTO: 423 K/UL (ref 150–450)
PMV BLD AUTO: 9.5 FL (ref 9.2–12.9)
POTASSIUM SERPL-SCNC: 4.1 MMOL/L (ref 3.5–5.1)
PROT SERPL-MCNC: 9.1 G/DL (ref 6–8.4)
RBC # BLD AUTO: 5.55 M/UL (ref 4–5.4)
SARS-COV-2 RDRP RESP QL NAA+PROBE: NEGATIVE
SODIUM SERPL-SCNC: 136 MMOL/L (ref 136–145)
SPECIMEN SOURCE: NORMAL
WBC # BLD AUTO: 13.54 K/UL (ref 3.9–12.7)

## 2024-12-12 PROCEDURE — 96374 THER/PROPH/DIAG INJ IV PUSH: CPT

## 2024-12-12 PROCEDURE — 80053 COMPREHEN METABOLIC PANEL: CPT | Performed by: NURSE PRACTITIONER

## 2024-12-12 PROCEDURE — 94640 AIRWAY INHALATION TREATMENT: CPT | Mod: XB

## 2024-12-12 PROCEDURE — 86803 HEPATITIS C AB TEST: CPT | Performed by: EMERGENCY MEDICINE

## 2024-12-12 PROCEDURE — 27000221 HC OXYGEN, UP TO 24 HOURS

## 2024-12-12 PROCEDURE — 93005 ELECTROCARDIOGRAM TRACING: CPT

## 2024-12-12 PROCEDURE — 87389 HIV-1 AG W/HIV-1&-2 AB AG IA: CPT | Performed by: EMERGENCY MEDICINE

## 2024-12-12 PROCEDURE — 83880 ASSAY OF NATRIURETIC PEPTIDE: CPT | Performed by: NURSE PRACTITIONER

## 2024-12-12 PROCEDURE — 63600175 PHARM REV CODE 636 W HCPCS: Performed by: EMERGENCY MEDICINE

## 2024-12-12 PROCEDURE — 99285 EMERGENCY DEPT VISIT HI MDM: CPT | Mod: 25

## 2024-12-12 PROCEDURE — 94761 N-INVAS EAR/PLS OXIMETRY MLT: CPT

## 2024-12-12 PROCEDURE — 85025 COMPLETE CBC W/AUTO DIFF WBC: CPT | Performed by: NURSE PRACTITIONER

## 2024-12-12 PROCEDURE — 25000242 PHARM REV CODE 250 ALT 637 W/ HCPCS: Performed by: EMERGENCY MEDICINE

## 2024-12-12 PROCEDURE — 87635 SARS-COV-2 COVID-19 AMP PRB: CPT | Performed by: NURSE PRACTITIONER

## 2024-12-12 PROCEDURE — 93010 ELECTROCARDIOGRAM REPORT: CPT | Mod: ,,, | Performed by: INTERNAL MEDICINE

## 2024-12-12 PROCEDURE — 87502 INFLUENZA DNA AMP PROBE: CPT | Performed by: NURSE PRACTITIONER

## 2024-12-12 RX ORDER — IPRATROPIUM BROMIDE AND ALBUTEROL SULFATE 2.5; .5 MG/3ML; MG/3ML
3 SOLUTION RESPIRATORY (INHALATION)
Status: COMPLETED | OUTPATIENT
Start: 2024-12-12 | End: 2024-12-12

## 2024-12-12 RX ORDER — METHYLPREDNISOLONE SOD SUCC 125 MG
125 VIAL (EA) INJECTION
Status: COMPLETED | OUTPATIENT
Start: 2024-12-12 | End: 2024-12-12

## 2024-12-12 RX ADMIN — METHYLPREDNISOLONE SODIUM SUCCINATE 125 MG: 125 INJECTION, POWDER, FOR SOLUTION INTRAMUSCULAR; INTRAVENOUS at 09:12

## 2024-12-12 RX ADMIN — IPRATROPIUM BROMIDE AND ALBUTEROL SULFATE 3 ML: 2.5; .5 SOLUTION RESPIRATORY (INHALATION) at 10:12

## 2024-12-12 RX ADMIN — IPRATROPIUM BROMIDE AND ALBUTEROL SULFATE 3 ML: 2.5; .5 SOLUTION RESPIRATORY (INHALATION) at 09:12

## 2024-12-13 VITALS
SYSTOLIC BLOOD PRESSURE: 126 MMHG | TEMPERATURE: 99 F | OXYGEN SATURATION: 93 % | HEART RATE: 104 BPM | RESPIRATION RATE: 21 BRPM | BODY MASS INDEX: 30.08 KG/M2 | HEIGHT: 64 IN | WEIGHT: 176.19 LBS | DIASTOLIC BLOOD PRESSURE: 68 MMHG

## 2024-12-13 LAB
OHS QRS DURATION: 72 MS
OHS QTC CALCULATION: 461 MS

## 2024-12-13 PROCEDURE — 94761 N-INVAS EAR/PLS OXIMETRY MLT: CPT

## 2024-12-13 PROCEDURE — 94640 AIRWAY INHALATION TREATMENT: CPT | Mod: XB

## 2024-12-13 PROCEDURE — 25000242 PHARM REV CODE 250 ALT 637 W/ HCPCS: Performed by: EMERGENCY MEDICINE

## 2024-12-13 RX ORDER — IPRATROPIUM BROMIDE AND ALBUTEROL SULFATE 2.5; .5 MG/3ML; MG/3ML
3 SOLUTION RESPIRATORY (INHALATION)
Status: COMPLETED | OUTPATIENT
Start: 2024-12-13 | End: 2024-12-13

## 2024-12-13 RX ORDER — METHYLPREDNISOLONE 4 MG/1
TABLET ORAL
Qty: 1 EACH | Refills: 0 | Status: SHIPPED | OUTPATIENT
Start: 2024-12-13

## 2024-12-13 RX ORDER — ALBUTEROL SULFATE 1.25 MG/3ML
1.25 SOLUTION RESPIRATORY (INHALATION) EVERY 6 HOURS PRN
Qty: 75 ML | Refills: 0 | Status: SHIPPED | OUTPATIENT
Start: 2024-12-13 | End: 2025-12-13

## 2024-12-13 RX ORDER — AZITHROMYCIN 250 MG/1
250 TABLET, FILM COATED ORAL DAILY
Qty: 6 TABLET | Refills: 0 | Status: SHIPPED | OUTPATIENT
Start: 2024-12-13

## 2024-12-13 RX ADMIN — IPRATROPIUM BROMIDE AND ALBUTEROL SULFATE 3 ML: 2.5; .5 SOLUTION RESPIRATORY (INHALATION) at 12:12

## 2024-12-13 NOTE — DISCHARGE INSTRUCTIONS
Rest  Drink plenty of clear fluids   Nasal saline spray to clear nasal drainage and help with nasal congestion  Zyrtec or Claritin to help dry mucus and post nasal drip  Mucinex or Mucinex DM for cough and chest congestion  Tylenol or Ibuprofen for fever, headache and body aches  Warm salt water gargles for throat comfort  Chloraseptic spray or lozenges for throat comfort  RTC with no improvement or worsening    Regarding UPPER RESPIRATORY ILLNESS, for treatment, I encouraged patient to: drink plenty of fluids; get lots of rest; take medications as prescribed; use over-the-counter medications for symptomatic treatment;  and use a humidifier or steam in the bathroom to improve patency of upper airway.  Patient instructed to notify primary care provider, or return to the emergency department, if the they: have a cough most days or have a cough that returns frequently; begin coughing up blood; develop a high fever or shaking chills; have a low-grade fever for three or more days; develop thick, greenish mucus, especially if it has a bad smell; and experience shortness of breath or chest pain. For prevention, discussed with patient the importance of refraining from smoking (if applicable), getting annual influenza vaccines, reducing exposure to air pollution, and the need to frequently wash hands to avoid spread of infection.     Regarding ASTHMA, I reviewed with patient the common asthma triggers including: Animals (pet hair or dander), dust, changes in weather (most often cold weather), chemicals in the air or in food, exercise, mold, pollen, stress, and tobacco smoke. Discussed ways to reduce asthma symptoms and by avoiding known triggers and substances that irritate the airways. Encouraged patient to seek immediate attention at an ED for symptoms that include: drowsiness or confusion, severe shortness of breath at rest, a peak flow measurement is less than 50% of personal best, severe chest pain, bluish color to the  lips and face, extreme difficulty breathing, rapid pulse, and severe anxiety due to shortness of breath. I Informed patient on the importance of complying with medication plan and reasons for use of control drugs (inhaled corticosteroid) to help prevent flare ups and need for using quick-relief medications during asthma attack.

## 2024-12-13 NOTE — ED PROVIDER NOTES
SCRIBE #1 NOTE: I, Fernie Cheng, am scribing for, and in the presence of, Ashwin Vann Jr., MD. I have scribed the entire note.       History     Chief Complaint   Patient presents with    Cough     Pt with history of asthma c/o coughing, sneezing and wheezing x 4 days.  Pt has had no improvement with breathing treatments.     Review of patient's allergies indicates:  No Known Allergies      History of Present Illness     HPI    12/12/2024, 9:07 PM  History obtained from the patient      History of Present Illness: Marlyn Gary is a 64 y.o. female patient with a PMHx of HTN, T2DM, and Thyroid disease who presents to the Emergency Department for evaluation of SOB which onset gradually five days ago. Symptoms are constant and moderate in severity. No mitigating or exacerbating factors reported. Associated sxs include a productive cough, wheezing, and rhinorrhea. The patient denies any tobacco use. She does not use O2 at home. Patient denies any abdominal pain, chest tightness, CP, and all other sxs at this time. Prior Tx includes Mucinex, four breathing treatments at home and an albuterol inhaler, none of which are improving her symptoms. No further complaints or concerns at this time.       Arrival mode: Personal vehicle     PCP: Bita Williamson PA        Past Medical History:  Past Medical History:   Diagnosis Date    Diverticular disease of large intestine without perforation or abscess     HTN (hypertension)     Thyroid disease     Type 2 diabetes mellitus with unspecified diabetic retinopathy without macular edema     Unspecified glaucoma        Past Surgical History:  Past Surgical History:   Procedure Laterality Date    COLONOSCOPY N/A 9/9/2022    Procedure: COLONOSCOPY;  Surgeon: Herman Cole MD;  Location: [facility];  Service: General;  Laterality: N/A;    EXTRACTION OF CATARACT Bilateral     FLEXIBLE SIGMOIDOSCOPY N/A 10/6/2022    Procedure: SIGMOIDOSCOPY, FLEXIBLE;  Surgeon:  Herman Cole MD;  Location: [facility] OR;  Service: General;  Laterality: N/A;    HEMORRHOID SURGERY      HYSTERECTOMY      INJECTION OF ANESTHETIC AGENT INTO TISSUE PLANE DEFINED BY TRANSVERSUS ABDOMINIS MUSCLE N/A 10/6/2022    Procedure: BLOCK, TRANSVERSUS ABDOMINIS PLANE;  Surgeon: Herman Cole MD;  Location: [facility] OR;  Service: General;  Laterality: N/A;    MOBILIZATION OF SPLENIC FLEXURE N/A 10/6/2022    Procedure: MOBILIZATION, SPLENIC FLEXURE;  Surgeon: Herman Cole MD;  Location: [facility] OR;  Service: General;  Laterality: N/A;  Colovaginal fistula takedown    REDUCTION OF BOTH BREASTS      REPAIR OF VAGINAL CUFF N/A 10/6/2022    Procedure: REPAIR, VAGINAL CUFF;  Surgeon: Bernadine Dial MD;  Location: [facility] OR;  Service: OB/GYN;  Laterality: N/A;    ROBOT-ASSISTED LAPAROSCOPIC LYSIS OF ADHESIONS USING DA NICOLE XI  10/6/2022    Procedure: XI ROBOTIC LYSIS, ADHESIONS;  Surgeon: Herman Cole MD;  Location: [facility] OR;  Service: General;;    ROBOT-ASSISTED LAPAROSCOPIC RESECTION OF SIGMOID COLON USING DA NICOLE XI N/A 10/6/2022    Procedure: XI ROBOTIC COLECTOMY, SIGMOID;  Surgeon: Herman Cole MD;  Location: [facility] OR;  Service: General;  Laterality: N/A;    VAGINA EXAMINATION UNDER ANESTHESIA N/A 10/6/2022    Procedure: EXAM UNDER ANESTHESIA, VAGINA;  Surgeon: Bernadine Dial MD;  Location: [facility] OR;  Service: OB/GYN;  Laterality: N/A;         Family History:  No family history on file.    Social History:  Social History     Tobacco Use    Smoking status: Never    Smokeless tobacco: Never   Substance and Sexual Activity    Alcohol use: Never    Drug use: Never    Sexual activity: Not on file        Review of Systems     Review of Systems   Constitutional:  Negative for fever.   HENT:  Positive for rhinorrhea and sore throat.    Respiratory:  Positive for cough, shortness of breath and wheezing. Negative for apnea and chest tightness.    Cardiovascular:  Negative for chest pain.  "  Gastrointestinal:  Negative for abdominal pain and nausea.   Genitourinary:  Negative for dysuria.   Musculoskeletal:  Negative for back pain.   Skin:  Negative for rash.   Neurological:  Negative for weakness.   Hematological:  Does not bruise/bleed easily.   All other systems reviewed and are negative.       Physical Exam     Initial Vitals [12/12/24 1715]   BP Pulse Resp Temp SpO2   (!) 125/59 109 20 99.1 °F (37.3 °C) (!) 92 %      MAP       --          Physical Exam  Nursing Notes and Vital Signs Reviewed.  Constitutional: Patient is in no acute distress. Well-developed and well-nourished.  Head: Atraumatic. Normocephalic.  Eyes: PERRL. EOM intact. Conjunctivae are not pale. No scleral icterus.  ENT: Mucous membranes are moist. Oropharynx is clear and symmetric.    Neck: Supple. Full ROM. No lymphadenopathy.  Cardiovascular: Tachycardic rate. Regular rhythm. No murmurs, rubs, or gallops. Distal pulses are 2+ and symmetric.  Pulmonary/Chest: Mild respiratory distress. Bilateral end-expiratory wheezing. On 2L NC. O2 saturation at 95%.   Abdominal: Soft and non-distended.  There is no tenderness.  No rebound, guarding, or rigidity. Good bowel sounds.  Genitourinary: No CVA tenderness  Musculoskeletal: Moves all extremities. No obvious deformities. No edema. No calf tenderness.  Skin: Warm and dry.  Neurological:  Alert, awake, and appropriate.  Normal speech.  No acute focal neurological deficits are appreciated.  Psychiatric: Normal affect. Good eye contact. Appropriate in content.     ED Course   Procedures  ED Vital Signs:  Vitals:    12/12/24 1715 12/12/24 2036 12/12/24 2100 12/12/24 2217   BP: (!) 125/59 136/65 (!) 147/77 134/69   Pulse: 109 106 103 109   Resp: 20  20 (!) 33   Temp: 99.1 °F (37.3 °C)      TempSrc: Oral      SpO2: (!) 92% 95% 100% 100%   Weight: 79.9 kg (176 lb 3.2 oz)      Height: 5' 4" (1.626 m)       12/12/24 2220 12/13/24 0033 12/13/24 0102   BP:   126/68   Pulse: (!) 112 102 104   Resp: " (!) 22 20 (!) 21   Temp:      TempSrc:      SpO2: 95% 100% (!) 93%   Weight:      Height:          Abnormal Lab Results:  Labs Reviewed   CBC W/ AUTO DIFFERENTIAL - Abnormal       Result Value    WBC 13.54 (*)     RBC 5.55 (*)     Hemoglobin 15.8      Hematocrit 48.2      MCV 87      MCH 28.5      MCHC 32.8      RDW 13.7      Platelets 423      MPV 9.5      Immature Granulocytes 0.4      Gran # (ANC) 9.2 (*)     Immature Grans (Abs) 0.06 (*)     Lymph # 3.2      Mono # 0.9      Eos # 0.2      Baso # 0.08      nRBC 0      Gran % 67.7      Lymph % 23.3      Mono % 6.4      Eosinophil % 1.6      Basophil % 0.6      Differential Method Automated     COMPREHENSIVE METABOLIC PANEL - Abnormal    Sodium 136      Potassium 4.1      Chloride 98      CO2 24      Glucose 97      BUN 9      Creatinine 0.8      Calcium 9.7      Total Protein 9.1 (*)     Albumin 4.3      Total Bilirubin 0.8      Alkaline Phosphatase 76      AST 26      ALT 28      eGFR >60      Anion Gap 14     INFLUENZA A & B BY MOLECULAR    Influenza A, Molecular Negative      Influenza B, Molecular Negative      Flu A & B Source Nasal swab     HEPATITIS C ANTIBODY    Hepatitis C Ab Negative      Narrative:     Release to patient->Immediate   HEP C VIRUS HOLD SPECIMEN    HEP C Virus Hold Specimen Hold for HCV sendout      Narrative:     Release to patient->Immediate   HIV 1 / 2 ANTIBODY    HIV 1/2 Ag/Ab Negative      Narrative:     Release to patient->Immediate   SARS-COV-2 RNA AMPLIFICATION, QUAL    SARS-CoV-2 RNA, Amplification, Qual Negative     B-TYPE NATRIURETIC PEPTIDE    BNP <10          All Lab Results:  Results for orders placed or performed during the hospital encounter of 12/12/24   EKG 12-lead    Collection Time: 12/12/24  6:25 PM   Result Value Ref Range    QRS Duration 72 ms    [facility] QTC Calculation 461 ms   Influenza A & B by Molecular    Collection Time: 12/12/24  6:31 PM    Specimen: Nasal Swab   Result Value Ref Range    Influenza A, Molecular  Negative Negative    Influenza B, Molecular Negative Negative    Flu A & B Source Nasal swab    COVID-19 Rapid Screening    Collection Time: 12/12/24  6:31 PM   Result Value Ref Range    SARS-CoV-2 RNA, Amplification, Qual Negative Negative   Hepatitis C Antibody    Collection Time: 12/12/24  8:58 PM   Result Value Ref Range    Hepatitis C Ab Negative Negative   HCV Virus Hold Specimen    Collection Time: 12/12/24  8:58 PM   Result Value Ref Range    HEP C Virus Hold Specimen Hold for HCV sendout    HIV 1/2 Ag/Ab (4th Gen)    Collection Time: 12/12/24  8:58 PM   Result Value Ref Range    HIV 1/2 Ag/Ab Negative Negative   CBC Auto Differential    Collection Time: 12/12/24  8:58 PM   Result Value Ref Range    WBC 13.54 (H) 3.90 - 12.70 K/uL    RBC 5.55 (H) 4.00 - 5.40 M/uL    Hemoglobin 15.8 12.0 - 16.0 g/dL    Hematocrit 48.2 37.0 - 48.5 %    MCV 87 82 - 98 fL    MCH 28.5 27.0 - 31.0 pg    MCHC 32.8 32.0 - 36.0 g/dL    RDW 13.7 11.5 - 14.5 %    Platelets 423 150 - 450 K/uL    MPV 9.5 9.2 - 12.9 fL    Immature Granulocytes 0.4 0.0 - 0.5 %    Gran # (ANC) 9.2 (H) 1.8 - 7.7 K/uL    Immature Grans (Abs) 0.06 (H) 0.00 - 0.04 K/uL    Lymph # 3.2 1.0 - 4.8 K/uL    Mono # 0.9 0.3 - 1.0 K/uL    Eos # 0.2 0.0 - 0.5 K/uL    Baso # 0.08 0.00 - 0.20 K/uL    nRBC 0 0 /100 WBC    Gran % 67.7 38.0 - 73.0 %    Lymph % 23.3 18.0 - 48.0 %    Mono % 6.4 4.0 - 15.0 %    Eosinophil % 1.6 0.0 - 8.0 %    Basophil % 0.6 0.0 - 1.9 %    Differential Method Automated    Comprehensive Metabolic Panel    Collection Time: 12/12/24  8:58 PM   Result Value Ref Range    Sodium 136 136 - 145 mmol/L    Potassium 4.1 3.5 - 5.1 mmol/L    Chloride 98 95 - 110 mmol/L    CO2 24 23 - 29 mmol/L    Glucose 97 70 - 110 mg/dL    BUN 9 8 - 23 mg/dL    Creatinine 0.8 0.5 - 1.4 mg/dL    Calcium 9.7 8.7 - 10.5 mg/dL    Total Protein 9.1 (H) 6.0 - 8.4 g/dL    Albumin 4.3 3.5 - 5.2 g/dL    Total Bilirubin 0.8 0.1 - 1.0 mg/dL    Alkaline Phosphatase 76 40 - 150 U/L     AST 26 10 - 40 U/L    ALT 28 10 - 44 U/L    eGFR >60 >60 mL/min/1.73 m^2    Anion Gap 14 8 - 16 mmol/L   Brain Natriuretic Peptide    Collection Time: 12/12/24  8:58 PM   Result Value Ref Range    BNP <10 0 - 99 pg/mL         Imaging Results:  Imaging Results              X-Ray Chest PA And Lateral (Final result)  Result time 12/12/24 19:38:05      Final result by Miriam Chacon MD (12/12/24 19:38:05)                   Impression:      No active pulmonary finding      Electronically signed by: Miriam Chacon  Date:    12/12/2024  Time:    19:38               Narrative:    EXAMINATION:  XR CHEST PA AND LATERAL    CLINICAL HISTORY:  SOB;    TECHNIQUE:  PA and lateral views of the chest were performed.    COMPARISON:  None    FINDINGS:  No pulmonary consolidation or pleural effusion no convincing pneumothorax.  Mediastinal contour normal                                       The EKG was ordered, reviewed, and independently interpreted by the ED provider.  Interpretation time: 18:25  Rate: 103 BPM  Rhythm: sinus tachycardia  Interpretation: No acute ST changes. No STEMI.             The Emergency Provider reviewed the vital signs and test results, which are outlined above.     ED Discussion     10:17 PM: Re-evaluated pt. She is still wheezing, her O2 saturation is 100 on 2L, we are attempting to wean her off of it and give another breathing tx. Pt is resting comfortably and is in no acute distress.  D/w pt all pertinent results. D/w pt any concerns expressed at this time. Answered all questions. Pt expresses understanding at this time.    12:02 AM: Re-evaluated pt. Wheezing is still present, but is improving. O2 saturation is 94%-95% on RA. She is requesting another breathing treatment.    12:47 AM: Reassessed pt at this time. Discussed with pt all pertinent ED information and results. Discussed pt dx and plan of tx. Gave pt all f/u and return to the ED instructions. All questions and concerns were addressed at  this time. Pt expresses understanding of information and instructions, and is comfortable with plan to discharge. Pt is stable for discharge.    I discussed with patient and/or family/caretaker that evaluation in the ED does not suggest any emergent or life threatening medical conditions requiring immediate intervention beyond what was provided in the ED, and I believe patient is safe for discharge.  Regardless, an unremarkable evaluation in the ED does not preclude the development or presence of a serious of life threatening condition. As such, patient was instructed to return immediately for any worsening or change in current symptoms.    Rest  Drink plenty of clear fluids   Nasal saline spray to clear nasal drainage and help with nasal congestion  Zyrtec or Claritin to help dry mucus and post nasal drip  Mucinex or Mucinex DM for cough and chest congestion  Tylenol or Ibuprofen for fever, headache and body aches  Warm salt water gargles for throat comfort  Chloraseptic spray or lozenges for throat comfort  RTC with no improvement or worsening    Regarding UPPER RESPIRATORY ILLNESS, for treatment, I encouraged patient to: drink plenty of fluids; get lots of rest; take medications as prescribed; use over-the-counter medications for symptomatic treatment;  and use a humidifier or steam in the bathroom to improve patency of upper airway.  Patient instructed to notify primary care provider, or return to the emergency department, if the they: have a cough most days or have a cough that returns frequently; begin coughing up blood; develop a high fever or shaking chills; have a low-grade fever for three or more days; develop thick, greenish mucus, especially if it has a bad smell; and experience shortness of breath or chest pain. For prevention, discussed with patient the importance of refraining from smoking (if applicable), getting annual influenza vaccines, reducing exposure to air pollution, and the need to  frequently wash hands to avoid spread of infection.     Regarding ASTHMA, I reviewed with patient the common asthma triggers including: Animals (pet hair or dander), dust, changes in weather (most often cold weather), chemicals in the air or in food, exercise, mold, pollen, stress, and tobacco smoke. Discussed ways to reduce asthma symptoms and by avoiding known triggers and substances that irritate the airways. Encouraged patient to seek immediate attention at an ED for symptoms that include: drowsiness or confusion, severe shortness of breath at rest, a peak flow measurement is less than 50% of personal best, severe chest pain, bluish color to the lips and face, extreme difficulty breathing, rapid pulse, and severe anxiety due to shortness of breath. I Informed patient on the importance of complying with medication plan and reasons for use of control drugs (inhaled corticosteroid) to help prevent flare ups and need for using quick-relief medications during asthma attack.       ED Course as of 12/17/24 2251   Thu Dec 12, 2024   3458 Rhythm strip reviewed. Sinus tachycardia, no stemi. 103bpm [LV]      ED Course User Index  [LV] Ashwin Vann Jr., MD     Medical Decision Making  Amount and/or Complexity of Data Reviewed  Labs: ordered. Decision-making details documented in ED Course.  Radiology: ordered and independent interpretation performed. Decision-making details documented in ED Course.  ECG/medicine tests: ordered and independent interpretation performed. Decision-making details documented in ED Course.    Risk  OTC drugs.  Prescription drug management.  Parenteral controlled substances.  Risk Details: OTC drugs, prescription drugs and controlled substances considered.  Due to patient's symptoms improving and pain controlled pain medications ordered appropriately.  Differential diagnoses:  Pneumonia, Congestive heart failure, Acute Myocardial infarction, Pleural effusion, Pulmonary edema, Pulmonary embolism,  Electrolyte imbalance, Infectious etiology, COPD exacerbation, Asthma exacerbation, Pneumothorax,  Cardiac tamponade, Anemia, Deconditioning, bronchospasm, upper airway obstruction such: Aspiration, Foreign body                  ED Medication(s):  Medications   albuterol-ipratropium 2.5 mg-0.5 mg/3 mL nebulizer solution 3 mL (3 mLs Nebulization Given 12/12/24 2100)   methylPREDNISolone sodium succinate injection 125 mg (125 mg Intravenous Given 12/12/24 2112)   albuterol-ipratropium 2.5 mg-0.5 mg/3 mL nebulizer solution 3 mL (3 mLs Nebulization Given 12/12/24 2220)   albuterol-ipratropium 2.5 mg-0.5 mg/3 mL nebulizer solution 3 mL (3 mLs Nebulization Given 12/13/24 0033)       Discharge Medication List as of 12/13/2024 12:46 AM        START taking these medications    Details   albuterol (ACCUNEB) 1.25 mg/3 mL Nebu Take 3 mLs (1.25 mg total) by nebulization every 6 (six) hours as needed. Rescue, Starting Fri 12/13/2024, Until Sat 12/13/2025 at 2359, Print      azithromycin (Z-ROBINA) 250 MG tablet Take 1 tablet (250 mg total) by mouth once daily. Take first 2 tablets together, then 1 every day until finished., Starting Fri 12/13/2024, Print      methylPREDNISolone (MEDROL DOSEPACK) 4 mg tablet use as directed, Print              Follow-up Information       Bita Williamson PA. Schedule an appointment as soon as possible for a visit in 1 week.    Specialties: General Practice, Family Medicine  Contact information:  [address]  [address]  [facility]  [place] [address] [phone]               [facility] - Emergency Dept..    Specialty: Emergency Medicine  Why: As needed, If symptoms worsen  Contact information:  [address]  [facility] [address] [phone]                               Scribe Attestation:   Scribe #1: I performed the above scribed service and the documentation accurately describes the services I performed. I attest to the accuracy of the note.      Attending:   Physician Attestation Statement for Scribe #1: I, Ashwin Vann Jr., MD, personally performed the services described in this documentation, as scribed by Fernie Cheng, in my presence, and it is both accurate and complete.           Clinical Impression       ICD-10-CM ICD-9-CM   1. Exacerbation of asthma, unspecified asthma severity, unspecified whether persistent  J45.901 493.92   2. Shortness of breath  R06.02 786.05   3. Upper respiratory tract infection, unspecified type  J06.9 465.9       Disposition:   Disposition: Discharged  Condition: Stable         Ashwin Vann Jr., MD  12/17/24 7836

## 2024-12-13 NOTE — ED NOTES
"Pt presents AAOx4 VSS GCS 15. Pt c/o a productive cough x 3 days. Reports greenish/yellow mucus. Also reports being, " a little short of breath at times." Denies CP. Pmhc HTN DM and thyroid dx   "

## 2025-03-12 NOTE — CONSULTS
Received consult for possible diverticular abscess drain. Reviewed all studies and d/w Dr. Mallory. Most recent CT shows small collection adjacent to sigmoid colon, not significantly changed from prior and c/w small diverticular abscess. Collection is too small for drainage catheter placement. Rec cont surveillance with short term f/u. If collection enlarges, drainage catheter placement can be attempted.    Arnaldo Brewer M.D.  Interventional Radiology  Department of Radiology  Pager: [phone]     secondary to above. continue dukes solution. GI consulted

## (undated) DEVICE — SYR 30CC LUER LOCK

## (undated) DEVICE — NDL SAFETY 22G X 1.5 ECLIPSE

## (undated) DEVICE — COVER TIP CURVED SCISSORS XI

## (undated) DEVICE — KIT ANTIFOG W/SPONG & FLUID

## (undated) DEVICE — SEALER VESSEL EXTEND

## (undated) DEVICE — ELECTRODE REM PLYHSV RETURN 9

## (undated) DEVICE — GAUZE SPONGE 4X4 12PLY

## (undated) DEVICE — SUT SILK 0 SUTUPAK SA86H

## (undated) DEVICE — GOWN POLY REINF BRTH SLV XL

## (undated) DEVICE — SUT MCRYL PLUS 4-0 PS2 27IN

## (undated) DEVICE — IRRIGATOR ENDOSCOPY DISP.

## (undated) DEVICE — COVER LIGHT HANDLE 80/CA

## (undated) DEVICE — SCISSOR 5MMX35CM DIRECT DRIVE

## (undated) DEVICE — NDL PNEUMO INSUFFLATI 120MM

## (undated) DEVICE — MANIFOLD 4 PORT

## (undated) DEVICE — DRAPE CORETEMP FLD WRM 56X62IN

## (undated) DEVICE — TUBING MEDI-VAC 20FT .25IN

## (undated) DEVICE — APPLICATOR CHLORAPREP ORN 26ML

## (undated) DEVICE — PAD PINK TRENDELENBURG POS XL

## (undated) DEVICE — SYR 3CC LUER LOC

## (undated) DEVICE — SUT VICRYL PLUS 3-0 SH 18IN

## (undated) DEVICE — PACK BASIC SETUP SC BR

## (undated) DEVICE — APPLICATOR STRL COT 2INNR 6IN

## (undated) DEVICE — UNDERGLOVES BIOGEL PI SIZE 7.5

## (undated) DEVICE — SUT PROLENE 2-0 30 SH

## (undated) DEVICE — COVER PROXIMA MAYO STAND

## (undated) DEVICE — SYR 10CC LUER LOCK

## (undated) DEVICE — TOWEL OR DISP STRL BLUE 4/PK

## (undated) DEVICE — SEAL UNIVERSAL 5MM-8MM XI

## (undated) DEVICE — DRAPE ABDOMINAL TIBURON 14X11

## (undated) DEVICE — Device

## (undated) DEVICE — SUT 1 36IN PDS II VIO MONO

## (undated) DEVICE — SOL NS 1000CC

## (undated) DEVICE — PACK DRAPE PERI/GYN TIBURON

## (undated) DEVICE — SOL ELECTROLUBE ANTI-STIC

## (undated) DEVICE — ADHESIVE MASTISOL VIAL 48/BX

## (undated) DEVICE — STAPLER ECHELON PWR CIR 29MM

## (undated) DEVICE — SUT VICRYL CTD 2-0 GI 27 SH

## (undated) DEVICE — DRAPE COLUMN DAVINCI XI

## (undated) DEVICE — TRAY CATH FOL SIL URIMTR 16FR

## (undated) DEVICE — DRAPE STERI LONG

## (undated) DEVICE — SUT VLOC 90 3-0 V-20 NDL 6

## (undated) DEVICE — GLOVE SURG BIOGEL LATEX SZ 7.5

## (undated) DEVICE — SYR ONLY LUER LOCK 20CC

## (undated) DEVICE — SUT VICRYL PLUS 0 CT1 18IN

## (undated) DEVICE — TIP GRASPER FENESTRATED DISP

## (undated) DEVICE — DRAPE ARM DAVINCI XI